# Patient Record
Sex: FEMALE | Race: WHITE | NOT HISPANIC OR LATINO | Employment: UNEMPLOYED | URBAN - METROPOLITAN AREA
[De-identification: names, ages, dates, MRNs, and addresses within clinical notes are randomized per-mention and may not be internally consistent; named-entity substitution may affect disease eponyms.]

---

## 2017-08-10 ENCOUNTER — APPOINTMENT (OUTPATIENT)
Dept: LAB | Facility: HOSPITAL | Age: 32
End: 2017-08-10
Payer: COMMERCIAL

## 2017-08-10 ENCOUNTER — TRANSCRIBE ORDERS (OUTPATIENT)
Dept: ADMINISTRATIVE | Facility: HOSPITAL | Age: 32
End: 2017-08-10

## 2017-08-10 DIAGNOSIS — Z00.8 HEALTH EXAMINATION IN POPULATION SURVEY: Primary | ICD-10-CM

## 2017-08-10 DIAGNOSIS — Z00.8 HEALTH EXAMINATION IN POPULATION SURVEY: ICD-10-CM

## 2017-08-10 LAB
CHOLEST SERPL-MCNC: 199 MG/DL (ref 50–200)
EST. AVERAGE GLUCOSE BLD GHB EST-MCNC: 108 MG/DL
HBA1C MFR BLD: 5.4 % (ref 4.2–6.3)
HDLC SERPL-MCNC: 64 MG/DL (ref 40–60)
LDLC SERPL CALC-MCNC: 126 MG/DL (ref 0–100)
TRIGL SERPL-MCNC: 45 MG/DL

## 2017-08-10 PROCEDURE — 83036 HEMOGLOBIN GLYCOSYLATED A1C: CPT

## 2017-08-10 PROCEDURE — 36415 COLL VENOUS BLD VENIPUNCTURE: CPT

## 2017-08-10 PROCEDURE — 80061 LIPID PANEL: CPT

## 2017-10-11 ENCOUNTER — GENERIC CONVERSION - ENCOUNTER (OUTPATIENT)
Dept: OTHER | Facility: OTHER | Age: 32
End: 2017-10-11

## 2017-11-01 ENCOUNTER — GENERIC CONVERSION - ENCOUNTER (OUTPATIENT)
Dept: OTHER | Facility: OTHER | Age: 32
End: 2017-11-01

## 2017-11-01 PROCEDURE — G0145 SCR C/V CYTO,THINLAYER,RESCR: HCPCS | Performed by: PHYSICIAN ASSISTANT

## 2017-11-01 PROCEDURE — 87624 HPV HI-RISK TYP POOLED RSLT: CPT | Performed by: PHYSICIAN ASSISTANT

## 2017-11-02 ENCOUNTER — LAB REQUISITION (OUTPATIENT)
Dept: LAB | Facility: HOSPITAL | Age: 32
End: 2017-11-02
Payer: COMMERCIAL

## 2017-11-02 DIAGNOSIS — Z01.419 ENCOUNTER FOR GYNECOLOGICAL EXAMINATION WITHOUT ABNORMAL FINDING: ICD-10-CM

## 2017-11-06 LAB — HPV RRNA GENITAL QL NAA+PROBE: NORMAL

## 2017-11-07 LAB
LAB AP GYN PRIMARY INTERPRETATION: NORMAL
Lab: NORMAL

## 2018-01-10 NOTE — PROGRESS NOTES
Assessment    1  Never smoker   2  No drug use   3  Migraines (346 90) (G43 909)    Plan  Migraines    · Topiramate 25 MG Oral Tablet; TAKE 3 TABLET Bedtime    Discussion/Summary    Migraines - restart Topamax 25 mg once at night for 1 week, then 50 mg at night for 1 week then 75 mg once at night  Hopefully you will be migraine free at this point, if not return to office for adjustments  f/u as needed  Possible side effects of new medications were reviewed with the patient/guardian today  The treatment plan was reviewed with the patient/guardian  The patient/guardian understands and agrees with the treatment plan      Chief Complaint  Pt presents to the office to discuss restarting the Topamax  PAP utd, under care [facility]      History of Present Illness  Patient here to go back on Topamax  Had a baby girl 3 weeks ago  Since delivering has already begun to have migraines  Uses Naproxen for breath through migraines with relief  But getting frequently already  Started Topamax for migraines more than 2 years  Took it for 9 months successfully, would get no break through migraines  Weened up to the 75 mg once daily  Started 25 mg once at night, then did 50 mg for two weeks then finally got to 75 mg with relief  Requesting to restart  Active Problems    1  Abnormal quad screen (796 5) (O28 0)   2  Blood type, Rh negative (V49 89) (Z67 91)   3  High risk pregnancy due to history of  labor in third trimester (V23 41) (O09 213)   4  High risk pregnancy with low PAPPA (796 5,V23 89) (O28 0,O09 899)   5  Maternal morbid obesity in third trimester, antepartum (649 13,278 01) (O99 213,E66 01)   6  Migraines (346 90) (G43 909)    Past Medical History    1  History of Need for Tdap vaccination (V06 1) (Z23)   2  History of Pregnancy, obstetrical care (V22 1) (Z34 90)    Surgical History    1  History of Ear Surgery   2  History of Myringotomy   3   History of Tonsillectomy    The surgical history was reviewed and updated today  Family History    1  Family history of goiter (V18 19) (Z83 49)    2  Family history of cardiac arrhythmia (V17 49) (Z82 49)    3  Family history of Colon cancer    4  Family history of malignant neoplasm of uterus (V16 49) (Z80 49)    5  Family history of Type 2 diabetes mellitus, controlled    6  Family history of Type 2 diabetes mellitus, controlled    7  Family history of hyperthyroidism (V18 19) (Z83 49)    Social History    · Alcohol use (V49 89) (F10 99)   · Always uses seat belt   ·    · Never smoker   · No drug use   · Occasional caffeine consumption    Current Meds   1  Magnesium CAPS; TAKE CAPSULE  per pt 400 mg daily; Therapy: (Recorded:46Vju3248) to Recorded    The medication list was reviewed and updated today  Allergies    1  Bactrim TABS    2  No Known Food Allergies   3  Seasonal    Vitals  Vital Signs [Data Includes: Current Encounter]    Recorded: 12Jan2016 11:08AM   Heart Rate 84, R Radial   Respiration 14   Systolic 261, RUE, Sitting   Diastolic 68, RUE, Sitting   Height 5 ft 2 5 in   Weight 220 lb    BMI Calculated 39 6   BSA Calculated 2     Physical Exam    Constitutional   General appearance: No acute distress, well appearing and well nourished  Pulmonary   Respiratory effort: No increased work of breathing or signs of respiratory distress  Auscultation of lungs: Clear to auscultation  Cardiovascular   Palpation of heart: Normal PMI, no thrills  Auscultation of heart: Normal rate and rhythm, normal S1 and S2, without murmurs      Psychiatric   Mood and affect: Normal          Future Appointments    Date/Time Provider Specialty Site   02/02/2016 09:00 AM Sergio Paul MD Obstetrics/Gynecology [address]      Signatures   Electronically signed by : Vandana Hoffman, [facility]; Jan 12 2016  1:09PM EST                       (Author)    Electronically signed by : TILA Carson ; Jan 12 2016  1:11PM EST (Author)

## 2018-01-16 NOTE — PROGRESS NOTES
Assessment    1  Never smoker   2  Encounter for preventive health examination (V70 0) (Z00 00)   3  Migraines (346 90) (G43 909)   4  Morbid obesity (278 01) (E66 01)    Plan  Health Maintenance    · (1) HEMOGLOBIN A1C; Status:Active; Requested for:[id number];    · (1) LIPID PANEL, FASTING; Status:Active; Requested for:34Amh3229;     Discussion/Summary  health maintenance visit healthy adult female Currently, she eats an adequate diet and has an inadequate exercise regimen  the risks and benefits of cervical cancer screening were discussed cervical cancer screening is current Breast cancer screening: the risks and benefits of breast cancer screening were discussed and monthly self breast exam was advised  The risks and benefits of immunizations were discussed and immunizations are up to date  Advice and education were given regarding nutrition, aerobic exercise, weight bearing exercise, weight loss and reproductive health  Patient discussion: discussed with the patient  Physical - conducted, lab slip given for employee physical, discussed healthy diet and weight loss and regular exercise, immunizations up to date    obesity - discussed healthy diet and weight    migraine - well controlled, c/w Topamax 25 mg 3 tabs at night    f/u 1 year or sooner if needed       Chief Complaint  Pt presents to the office for her yearly wellness PE  PAP due, under care [facility]iel[facility]      History of Present Illness  HM, Adult Female: The patient is being seen for a health maintenance evaluation  The last health maintenance visit was 1 year(s) ago  General Health: The patient's health since the last visit is described as good  She has regular dental visits  She denies vision problems  She denies hearing loss  Lifestyle:  She consumes a diverse and healthy diet  She does not exercise regularly  She exercises less than three times a week  Exercise includes walking  She does not use tobacco  She consumes alcohol   She reports occasional alcohol use  She denies drug use  Reproductive health: the patient is premenopausal   LMP - rare spotting with Mirena  Screening:   HPI: Patient here for employee wellness  No problems concerns  Has a 11 month old and trying to eat healthy and exercise but has trouble finding time with baby  Review of Systems    Constitutional: No fever, no chills, feels well, no tiredness, no recent weight gain or weight loss  Eyes: No complaints of eye pain, no red eyes, no eyesight problems, no discharge, no dry eyes, no itching of eyes  ENT: no complaints of earache, no loss of hearing, no nose bleeds, no nasal discharge, no sore throat, no hoarseness  Cardiovascular: No complaints of slow heart rate, no fast heart rate, no chest pain, no palpitations, no leg claudication, no lower extremity edema  Respiratory: No complaints of shortness of breath, no wheezing, no cough, no SOB on exertion, no orthopnea, no PND  Gastrointestinal: No complaints of abdominal pain, no constipation, no nausea or vomiting, no diarrhea, no bloody stools  Genitourinary: No complaints of dysuria, no incontinence, no pelvic pain, no dysmenorrhea, no vaginal discharge or bleeding  Musculoskeletal: No complaints of arthralgias, no myalgias, no joint swelling or stiffness, no limb pain or swelling  Integumentary: No complaints of skin rash or lesions, no itching, no skin wounds, no breast pain or lump  Neurological: No complaints of headache, no confusion, no convulsions, no numbness, no dizziness or fainting, no tingling, no limb weakness, no difficulty walking  Psychiatric: Not suicidal, no sleep disturbance, no anxiety or depression, no change in personality, no emotional problems  Endocrine: No complaints of proptosis, no hot flashes, no muscle weakness, no deepening of the voice, no feelings of weakness     Hematologic/Lymphatic: No complaints of swollen glands, no swollen glands in the neck, does not bleed easily, does not bruise easily  Active Problems    1  Migraines (346 90) (G43 909)    Past Medical History    · History of Abnormal quad screen (796 5) (O28 0)   · History of Blood type, Rh negative (V49 89) (Z67 91)   · History of Encounter for gynecological examination without abnormal finding (V72 31)  (Z01 419)   · History of Encounter for insertion of mirena IUD (V25 11) (Z30 430)   · History of High risk pregnancy due to history of  labor in third trimester (V23 41)  (O09 213)   · History of High risk pregnancy with low PAPPA (796 5,V23 89) (O28 0,O09 899)   · History of Irregular bleeding (626 4) (N92 6)   · History of Maternal morbid obesity in third trimester, antepartum (649 13,278 01)  (O99 213,E66 01)   · History of Need for Tdap vaccination (V06 1) (Z23)   · History of Postpartum exam (V24 2) (Z39 2)   · History of Pregnancy, obstetrical care (V22 1) (Z34 90)    Surgical History    · History of Ear Surgery   · History of Myringotomy   · History of Tonsillectomy    Family History  Mother    · Family history of goiter (V18 19) (Z80 46)  Father    · Family history of cardiac arrhythmia (V17 49) (Z82 49)  Maternal Grandmother    · Family history of Colon cancer  Paternal Grandmother    · Family history of malignant neoplasm of uterus (V16 49) (Z80 49)  Maternal Grandfather    · Family history of Type 2 diabetes mellitus, controlled  Paternal Grandfather    · Family history of Type 2 diabetes mellitus, controlled  Maternal Aunt    · Family history of hyperthyroidism (V18 19) (Z83 49)    Social History    · Alcohol use (V49 89) (Z78 9)   · Always uses seat belt   ·    · Never smoker   · No drug use   · Occasional caffeine consumption    Current Meds   1  Topiramate 25 MG Oral Tablet; TAKE 3 TABLET Bedtime; Therapy: [id number] to (Evaluate:77Oue5529)  Requested for: 2016; Last   Rx:2016 Ordered    Allergies    1  Bactrim TABS    2  No Known Food Allergies   3  Seasonal    Vitals   Recorded: 22ECG4000 27:03UI   Systolic 423, RUE, Sitting   Diastolic 70, RUE, Sitting   Heart Rate 68, R Radial   Pulse Quality Normal, R Radial   Respiration Quality Normal   Respiration 16   Height 5 ft 2 25 in   Weight 221 lb 12 8 oz   BMI Calculated 40 24   BSA Calculated 2     Physical Exam    Constitutional   General appearance: No acute distress, well appearing and well nourished  Head and Face   Head and face: Normal     Eyes   Conjunctiva and lids: No swelling, erythema or discharge  Pupils and irises: Equal, round, reactive to light  Ears, Nose, Mouth, and Throat   External inspection of ears and nose: Normal     Otoscopic examination: Tympanic membranes translucent with normal light reflex  Canals patent without erythema  Hearing: Normal     Nasal mucosa, septum, and turbinates: Normal without edema or erythema  Lips, teeth, and gums: Normal, good dentition  Oropharynx: Normal with no erythema, edema, exudate or lesions  Neck   Neck: Supple, symmetric, trachea midline, no masses  Thyroid: Normal, no thyromegaly  Pulmonary   Respiratory effort: No increased work of breathing or signs of respiratory distress  Palpation of chest: Normal     Auscultation of lungs: Clear to auscultation  Cardiovascular   Palpation of heart: Normal PMI, no thrills  Auscultation of heart: Normal rate and rhythm, normal S1 and S2, no murmurs  Pedal pulses: 2+ bilaterally  Examination of extremities for edema and/or varicosities: Normal     Abdomen   Abdomen: Non-tender, no masses  Liver and spleen: No hepatomegaly or splenomegaly  Lymphatic   Palpation of lymph nodes in neck: No lymphadenopathy  Musculoskeletal   Gait and station: Normal     Digits and nails: Normal without clubbing or cyanosis      Joints, bones, and muscles: Normal     Range of motion: Normal     Stability: Normal     Muscle strength/tone: Normal     Skin   Skin and subcutaneous tissue: Normal without rashes or lesions  Palpation of skin and subcutaneous tissue: Normal turgor  Neurologic   Cranial nerves: Cranial nerves II-XII intact  Reflexes: 2+ and symmetric      Psychiatric   Judgment and insight: Normal     Mood and affect: Normal        Signatures   Electronically signed by : Julius Potter, [facility]; Jul 18 2016 12:10PM EST                       (Author)    Electronically signed by : TILA Pierre ; Jul 18 2016 12:22PM EST                       (Author)

## 2018-01-22 VITALS
HEIGHT: 62 IN | BODY MASS INDEX: 43.61 KG/M2 | DIASTOLIC BLOOD PRESSURE: 80 MMHG | SYSTOLIC BLOOD PRESSURE: 110 MMHG | WEIGHT: 237 LBS

## 2018-01-22 VITALS — BODY MASS INDEX: 42.4 KG/M2 | WEIGHT: 230.4 LBS | HEIGHT: 62 IN

## 2018-02-10 DIAGNOSIS — Z12.31 ENCOUNTER FOR SCREENING MAMMOGRAM FOR MALIGNANT NEOPLASM OF BREAST: ICD-10-CM

## 2018-04-06 ENCOUNTER — APPOINTMENT (EMERGENCY)
Dept: ULTRASOUND IMAGING | Facility: HOSPITAL | Age: 33
End: 2018-04-06
Payer: COMMERCIAL

## 2018-04-06 ENCOUNTER — HOSPITAL ENCOUNTER (EMERGENCY)
Facility: HOSPITAL | Age: 33
Discharge: HOME | End: 2018-04-06
Admitting: EMERGENCY MEDICINE
Payer: COMMERCIAL

## 2018-04-06 VITALS
OXYGEN SATURATION: 98 % | BODY MASS INDEX: 40.75 KG/M2 | RESPIRATION RATE: 19 BRPM | HEIGHT: 63 IN | DIASTOLIC BLOOD PRESSURE: 65 MMHG | HEART RATE: 70 BPM | TEMPERATURE: 96.9 F | WEIGHT: 230 LBS | SYSTOLIC BLOOD PRESSURE: 130 MMHG

## 2018-04-06 DIAGNOSIS — R11.0 NAUSEA: ICD-10-CM

## 2018-04-06 DIAGNOSIS — K29.70 GASTRITIS: Primary | ICD-10-CM

## 2018-04-06 LAB
ALBUMIN SERPL BCP-MCNC: 4.1 G/DL (ref 3.5–5)
ALP SERPL-CCNC: 42 U/L (ref 46–116)
ALT SERPL W P-5'-P-CCNC: 39 U/L (ref 12–78)
ANION GAP SERPL CALCULATED.3IONS-SCNC: 10 MMOL/L (ref 4–13)
AST SERPL W P-5'-P-CCNC: 23 U/L (ref 5–45)
BACTERIA UR QL AUTO: ABNORMAL /HPF
BASOPHILS # BLD AUTO: 0.02 THOUSANDS/ΜL (ref 0–0.1)
BASOPHILS NFR BLD AUTO: 0 % (ref 0–1)
BILIRUB SERPL-MCNC: 0.4 MG/DL (ref 0.2–1)
BILIRUB UR QL STRIP: NEGATIVE
BUN SERPL-MCNC: 16 MG/DL (ref 5–25)
CALCIUM SERPL-MCNC: 8.4 MG/DL (ref 8.3–10.1)
CHLORIDE SERPL-SCNC: 103 MMOL/L (ref 100–108)
CLARITY UR: ABNORMAL
CO2 SERPL-SCNC: 27 MMOL/L (ref 21–32)
COLOR UR: YELLOW
CREAT SERPL-MCNC: 0.79 MG/DL (ref 0.6–1.3)
EOSINOPHIL # BLD AUTO: 0.04 THOUSAND/ΜL (ref 0–0.61)
EOSINOPHIL NFR BLD AUTO: 0 % (ref 0–6)
ERYTHROCYTE [DISTWIDTH] IN BLOOD BY AUTOMATED COUNT: 13.7 % (ref 11.6–15.1)
EXT PREG TEST URINE: NEGATIVE
GFR SERPL CREATININE-BSD FRML MDRD: 97 ML/MIN/1.73SQ M
GLUCOSE SERPL-MCNC: 89 MG/DL (ref 65–140)
GLUCOSE UR STRIP-MCNC: NEGATIVE MG/DL
HCT VFR BLD AUTO: 42.5 % (ref 34.8–46.1)
HGB BLD-MCNC: 14.4 G/DL (ref 11.5–15.4)
HGB UR QL STRIP.AUTO: ABNORMAL
KETONES UR STRIP-MCNC: NEGATIVE MG/DL
LEUKOCYTE ESTERASE UR QL STRIP: ABNORMAL
LIPASE SERPL-CCNC: 117 U/L (ref 73–393)
LYMPHOCYTES # BLD AUTO: 2.04 THOUSANDS/ΜL (ref 0.6–4.47)
LYMPHOCYTES NFR BLD AUTO: 20 % (ref 14–44)
MCH RBC QN AUTO: 28.4 PG (ref 26.8–34.3)
MCHC RBC AUTO-ENTMCNC: 33.9 G/DL (ref 31.4–37.4)
MCV RBC AUTO: 84 FL (ref 82–98)
MONOCYTES # BLD AUTO: 0.56 THOUSAND/ΜL (ref 0.17–1.22)
MONOCYTES NFR BLD AUTO: 5 % (ref 4–12)
MUCOUS THREADS UR QL AUTO: ABNORMAL
NEUTROPHILS # BLD AUTO: 7.75 THOUSANDS/ΜL (ref 1.85–7.62)
NEUTS SEG NFR BLD AUTO: 75 % (ref 43–75)
NITRITE UR QL STRIP: NEGATIVE
NON-SQ EPI CELLS URNS QL MICRO: ABNORMAL /HPF
OTHER STN SPEC: ABNORMAL
PH UR STRIP.AUTO: 5.5 [PH] (ref 4.5–8)
PLATELET # BLD AUTO: 319 THOUSANDS/UL (ref 149–390)
PMV BLD AUTO: 10.1 FL (ref 8.9–12.7)
POTASSIUM SERPL-SCNC: 3.9 MMOL/L (ref 3.5–5.3)
PROT SERPL-MCNC: 8.1 G/DL (ref 6.4–8.2)
PROT UR STRIP-MCNC: NEGATIVE MG/DL
RBC # BLD AUTO: 5.07 MILLION/UL (ref 3.81–5.12)
RBC #/AREA URNS AUTO: ABNORMAL /HPF
SODIUM SERPL-SCNC: 140 MMOL/L (ref 136–145)
SP GR UR STRIP.AUTO: >=1.03 (ref 1–1.03)
UROBILINOGEN UR QL STRIP.AUTO: 0.2 E.U./DL
WBC # BLD AUTO: 10.41 THOUSAND/UL (ref 4.31–10.16)
WBC #/AREA URNS AUTO: ABNORMAL /HPF

## 2018-04-06 PROCEDURE — 76705 ECHO EXAM OF ABDOMEN: CPT

## 2018-04-06 PROCEDURE — 83690 ASSAY OF LIPASE: CPT | Performed by: PHYSICIAN ASSISTANT

## 2018-04-06 PROCEDURE — 99284 EMERGENCY DEPT VISIT MOD MDM: CPT

## 2018-04-06 PROCEDURE — 96374 THER/PROPH/DIAG INJ IV PUSH: CPT

## 2018-04-06 PROCEDURE — 36415 COLL VENOUS BLD VENIPUNCTURE: CPT | Performed by: PHYSICIAN ASSISTANT

## 2018-04-06 PROCEDURE — 81001 URINALYSIS AUTO W/SCOPE: CPT | Performed by: PHYSICIAN ASSISTANT

## 2018-04-06 PROCEDURE — 96375 TX/PRO/DX INJ NEW DRUG ADDON: CPT

## 2018-04-06 PROCEDURE — 80053 COMPREHEN METABOLIC PANEL: CPT | Performed by: PHYSICIAN ASSISTANT

## 2018-04-06 PROCEDURE — 85025 COMPLETE CBC W/AUTO DIFF WBC: CPT | Performed by: PHYSICIAN ASSISTANT

## 2018-04-06 PROCEDURE — C9113 INJ PANTOPRAZOLE SODIUM, VIA: HCPCS | Performed by: PHYSICIAN ASSISTANT

## 2018-04-06 PROCEDURE — 81025 URINE PREGNANCY TEST: CPT | Performed by: PHYSICIAN ASSISTANT

## 2018-04-06 RX ORDER — ONDANSETRON 4 MG/1
4 TABLET, ORALLY DISINTEGRATING ORAL EVERY 8 HOURS PRN
Qty: 15 TABLET | Refills: 0 | Status: SHIPPED | OUTPATIENT
Start: 2018-04-06 | End: 2021-08-30 | Stop reason: ALTCHOICE

## 2018-04-06 RX ORDER — TOPIRAMATE 25 MG/1
75 TABLET ORAL DAILY
COMMUNITY
End: 2018-11-23 | Stop reason: SDUPTHER

## 2018-04-06 RX ORDER — ONDANSETRON 2 MG/ML
4 INJECTION INTRAMUSCULAR; INTRAVENOUS ONCE
Status: COMPLETED | OUTPATIENT
Start: 2018-04-06 | End: 2018-04-06

## 2018-04-06 RX ORDER — PANTOPRAZOLE SODIUM 40 MG/1
40 INJECTION, POWDER, FOR SOLUTION INTRAVENOUS ONCE
Status: COMPLETED | OUTPATIENT
Start: 2018-04-06 | End: 2018-04-06

## 2018-04-06 RX ORDER — PANTOPRAZOLE SODIUM 20 MG/1
20 TABLET, DELAYED RELEASE ORAL DAILY
Qty: 20 TABLET | Refills: 0 | Status: SHIPPED | OUTPATIENT
Start: 2018-04-06 | End: 2018-09-24 | Stop reason: ALTCHOICE

## 2018-04-06 RX ADMIN — PANTOPRAZOLE SODIUM 40 MG: 40 INJECTION, POWDER, FOR SOLUTION INTRAVENOUS at 13:22

## 2018-04-06 RX ADMIN — ONDANSETRON HYDROCHLORIDE 4 MG: 2 INJECTION, SOLUTION INTRAMUSCULAR; INTRAVENOUS at 12:28

## 2018-04-06 NOTE — DISCHARGE INSTRUCTIONS
Drink plenty of fluids  Follow up with your family doctor if symptoms are not improving  If symptoms worsen return to the ER  Gastritis   WHAT YOU NEED TO KNOW:   Gastritis is inflammation or irritation of the lining of your stomach  DISCHARGE INSTRUCTIONS:   Call 911 for any of the following:   · You develop chest pain or shortness of breath  Return to the emergency department if:   · You vomit blood  · You have black or bloody bowel movements  · You have severe stomach or back pain  Contact your healthcare provider if:   · You have a fever  · You have new or worsening symptoms, even after treatment  · You have questions or concerns about your condition or care  Medicines:   · Medicines  may be given to help treat a bacterial infection or decrease stomach acid  · Take your medicine as directed  Contact your healthcare provider if you think your medicine is not helping or if you have side effects  Tell him or her if you are allergic to any medicine  Keep a list of the medicines, vitamins, and herbs you take  Include the amounts, and when and why you take them  Bring the list or the pill bottles to follow-up visits  Carry your medicine list with you in case of an emergency  Manage or prevent gastritis:   · Do not smoke  Nicotine and other chemicals in cigarettes and cigars can make your symptoms worse and cause lung damage  Ask your healthcare provider for information if you currently smoke and need help to quit  E-cigarettes or smokeless tobacco still contain nicotine  Talk to your healthcare provider before you use these products  · Do not drink alcohol  Alcohol can prevent healing and make your gastritis worse  Talk to your healthcare provider if you need help to stop drinking  · Do not take NSAIDs or aspirin unless directed  These and similar medicines can cause irritation  If your healthcare provider says it is okay to take NSAIDs, take them with food       · Do not eat foods that cause irritation  Foods such as oranges and salsa can cause burning or pain  Eat a variety of healthy foods  Examples include fruits (not citrus), vegetables, low-fat dairy products, beans, whole-grain breads, and lean meats and fish  Try to eat small meals, and drink water with your meals  Do not eat for at least 3 hours before you go to bed  · Find ways to relax and decrease stress  Stress can increase stomach acid and make gastritis worse  Activities such as yoga, meditation, or listening to music can help you relax  Spend time with friends, or do things you enjoy  Follow up with your healthcare provider as directed: You may need ongoing tests or treatment, or referral to a gastroenterologist  Write down your questions so you remember to ask them during your visits  © 2017 [employer] Information is for End User's use only and may not be sold, redistributed or otherwise used for commercial purposes  All illustrations and images included in CareNotes® are the copyrighted property of [employer]  or [employer]  The above information is an  only  It is not intended as medical advice for individual conditions or treatments  Talk to your doctor, nurse or pharmacist before following any medical regimen to see if it is safe and effective for you  Acute Nausea and Vomiting   WHAT YOU NEED TO KNOW:   Acute nausea and vomiting start suddenly, worsen quickly, and last a short time  DISCHARGE INSTRUCTIONS:   Return to the emergency department if:   · You see blood in your vomit or your bowel movements  · You have sudden, severe pain in your chest and upper abdomen after hard vomiting or retching  · You have swelling in your neck and chest      · You are dizzy, cold, and thirsty and your eyes and mouth are dry  · You are urinating very little or not at all  · You have muscle weakness, leg cramps, and trouble breathing       · Your heart is beating much faster than normal      · You continue to vomit for more than 48 hours  Contact your healthcare provider if:   · You have frequent dry heaves (vomiting but nothing comes out)  · Your nausea and vomiting does not get better or go away after you use medicine  · You have questions or concerns about your condition or treatment  Medicines: You may need any of the following:  · Medicines  may be given to calm your stomach and stop your vomiting  You may also need medicines to help you feel more relaxed or to stop nausea and vomiting caused by motion sickness  · Gastrointestinal stimulants  are used to help empty your stomach and bowels  This may help decrease nausea and vomiting  · Take your medicine as directed  Contact your healthcare provider if you think your medicine is not helping or if you have side effects  Tell him or her if you are allergic to any medicine  Keep a list of the medicines, vitamins, and herbs you take  Include the amounts, and when and why you take them  Bring the list or the pill bottles to follow-up visits  Carry your medicine list with you in case of an emergency  Prevent or manage acute nausea and vomiting:   · Do not drink alcohol  Alcohol may upset or irritate your stomach  Too much alcohol can also cause acute nausea and vomiting  · Control stress  Headaches due to stress may cause nausea and vomiting  Find ways to relax and manage your stress  Get more rest and sleep  · Drink more liquids as directed  Vomiting can lead to dehydration  It is important to drink more liquids to help replace lost body fluids  Ask your healthcare provider how much liquid to drink each day and which liquids are best for you  Your provider may recommend that you drink an oral rehydration solution (ORS)  ORS contains water, salts, and sugar that are needed to replace the lost body fluids  Ask what kind of ORS to use, how much to drink, and where to get it      · Eat smaller meals, more often  Eat small amounts of food every 2 to 3 hours, even if you are not hungry  Food in your stomach may decrease your nausea  · Talk to your healthcare provider before you take over-the-counter (OTC) medicines  These medicines can cause serious problems if you use certain other medicines, or you have a medical condition  You may have problems if you use too much or use them for longer than the label says  Follow directions on the label carefully  Follow up with your healthcare provider as directed:  Write down your questions so you remember to ask them during your follow-up visits  © 2017 [facility] Information is for End User's use only and may not be sold, redistributed or otherwise used for commercial purposes  All illustrations and images included in CareNotes® are the copyrighted property of [employer]  or [employer]  The above information is an  only  It is not intended as medical advice for individual conditions or treatments  Talk to your doctor, nurse or pharmacist before following any medical regimen to see if it is safe and effective for you

## 2018-04-06 NOTE — ED PROVIDER NOTES
History  Chief Complaint   Patient presents with    Abdominal Pain     patient presents to the ER stating she has stomach pain and burning after eating with dry heaves since easter  27 yo female presents to the ER with upper abdominal pain/cramping and nausea that started 1 week ago  Pt states that she has a burning sensation across the top of her abdomen and worsens after she eats  Symptoms last approx 1 hour and pt has chills when she is having pain  Pt has tried pepto which has helped but states that she would have to drink it all the time to get relief  Pt has not vomited, denies diarrhea or constipation and states that the pain is mainly across the top of her abdomen and currently feels like she has a ball at the top of her throat and nauseous  Pt denies SOB, dizziness, chest tightness or pain, back pain  Pt has had a decrease in appetite  Prior to Admission Medications   Prescriptions Last Dose Informant Patient Reported? Taking?   topiramate (TOPAMAX) 25 mg tablet   Yes Yes   Sig: Take 75 mg by mouth daily      Facility-Administered Medications: None       Past Medical History:   Diagnosis Date    Migraine        Past Surgical History:   Procedure Laterality Date    TONSILLECTOMY      WISDOM TOOTH EXTRACTION         History reviewed  No pertinent family history  I have reviewed and agree with the history as documented  Social History   Substance Use Topics    Smoking status: Never Smoker    Smokeless tobacco: Never Used    Alcohol use No        Review of Systems   Constitutional: Positive for appetite change and chills  Respiratory: Negative for chest tightness and shortness of breath  Cardiovascular: Negative for chest pain  Gastrointestinal: Positive for abdominal pain and nausea  Negative for constipation, diarrhea and vomiting  All other systems reviewed and are negative        Physical Exam  ED Triage Vitals [04/06/18 1146]   Temperature Pulse Respirations Blood Pressure SpO2   (!) 96 9 °F (36 1 °C) 75 18 125/62 99 %      Temp Source Heart Rate Source Patient Position - Orthostatic VS BP Location FiO2 (%)   Tympanic Monitor Lying Right arm --      Pain Score       4           Orthostatic Vital Signs  Vitals:    04/06/18 1146 04/06/18 1300 04/06/18 1400   BP: 125/62 120/60 130/65   Pulse: 75 73 70   Patient Position - Orthostatic VS: Lying         Physical Exam   Constitutional: She is oriented to person, place, and time  Vital signs are normal  She appears well-developed and well-nourished  HENT:   Head: Normocephalic and atraumatic  Eyes: Conjunctivae and EOM are normal  Pupils are equal, round, and reactive to light  Neck: Normal range of motion  Neck supple  Cardiovascular: Normal rate, regular rhythm and normal heart sounds  Pulmonary/Chest: Effort normal and breath sounds normal    Abdominal: Soft  Bowel sounds are normal  There is tenderness in the right upper quadrant, epigastric area and left upper quadrant  There is positive Parrish's sign (mild, pt felt nauseous after pressing in RUQ)  There is no CVA tenderness and no tenderness at McBurney's point  Musculoskeletal: Normal range of motion  Neurological: She is alert and oriented to person, place, and time  Skin: Skin is warm and dry  Psychiatric: She has a normal mood and affect  Her speech is normal and behavior is normal  Judgment and thought content normal    Nursing note and vitals reviewed        ED Medications  Medications   ondansetron (ZOFRAN) injection 4 mg (4 mg Intravenous Given 4/6/18 1228)   pantoprazole (PROTONIX) injection 40 mg (40 mg Intravenous Given 4/6/18 1322)       Diagnostic Studies  Results Reviewed     Procedure Component Value Units Date/Time    Urine Microscopic [[id number]]  (Abnormal) Collected:  04/06/18 1225    Lab Status:  Final result Specimen:  Urine from Urine, Clean Catch Updated:  04/06/18 1310     RBC, UA 1-2 (A) /hpf      WBC, UA 4-10 (A) /hpf      Epithelial Cells Moderate (A) /hpf      Bacteria, UA Occasional /hpf      OTHER OBSERVATIONS Yeast Cells Present  Renal Epithelial Cells Present     MUCOUS THREADS Occasional    Comprehensive metabolic panel [[id number]]  (Abnormal) Collected:  04/06/18 1223    Lab Status:  Final result Specimen:  Blood from Arm, Left Updated:  04/06/18 1253     Sodium 140 mmol/L      Potassium 3 9 mmol/L      Chloride 103 mmol/L      CO2 27 mmol/L      Anion Gap 10 mmol/L      BUN 16 mg/dL      Creatinine 0 79 mg/dL      Glucose 89 mg/dL      Calcium 8 4 mg/dL      AST 23 U/L      ALT 39 U/L      Alkaline Phosphatase 42 (L) U/L      Total Protein 8 1 g/dL      Albumin 4 1 g/dL      Total Bilirubin 0 40 mg/dL      eGFR 97 ml/min/1 73sq m     Narrative:         National Kidney Disease Education Program recommendations are as follows:  GFR calculation is accurate only with a steady state creatinine  Chronic Kidney disease less than 60 ml/min/1 73 sq  meters  Kidney failure less than 15 ml/min/1 73 sq  meters      Lipase [[id number]]  (Normal) Collected:  04/06/18 1223    Lab Status:  Final result Specimen:  Blood from Arm, Left Updated:  04/06/18 1253     Lipase 117 u/L     UA w Reflex to Microscopic w Reflex to Culture [[id number]]  (Abnormal) Collected:  04/06/18 1225    Lab Status:  Final result Specimen:  Urine from Urine, Clean Catch Updated:  04/06/18 1250     Color, UA Yellow     Clarity, UA Slightly Cloudy     Specific Gravity, UA >=1 030     pH, UA 5 5     Leukocytes, UA Small (A)     Nitrite, UA Negative     Protein, UA Negative mg/dl      Glucose, UA Negative mg/dl      Ketones, UA Negative mg/dl      Urobilinogen, UA 0 2 E U /dl      Bilirubin, UA Negative     Blood, UA Moderate (A)    CBC and differential [[id number]]  (Abnormal) Collected:  04/06/18 1223    Lab Status:  Final result Specimen:  Blood from Arm, Left Updated:  04/06/18 1236     WBC 10 41 (H) Thousand/uL      RBC 5 07 Million/uL      Hemoglobin 14 4 g/dL      Hematocrit 42 5 % MCV 84 fL      MCH 28 4 pg      MCHC 33 9 g/dL      RDW 13 7 %      MPV 10 1 fL      Platelets 373 Thousands/uL      Neutrophils Relative 75 %      Lymphocytes Relative 20 %      Monocytes Relative 5 %      Eosinophils Relative 0 %      Basophils Relative 0 %      Neutrophils Absolute 7 75 (H) Thousands/µL      Lymphocytes Absolute 2 04 Thousands/µL      Monocytes Absolute 0 56 Thousand/µL      Eosinophils Absolute 0 04 Thousand/µL      Basophils Absolute 0 02 Thousands/µL     POCT pregnancy, urine [[id number]]  (Normal) Resulted:  04/06/18 1232    Lab Status:  Final result Updated:  04/06/18 1232     EXT PREG TEST UR (Ref: Negative) negative                 US gallbladder   Final Result by Delfina Pena DO (04/06 1435)      Limited evaluation of the pancreas  Otherwise, unremarkable right upper quadrant ultrasound  Workstation performed: [id number]                    Procedures  Procedures       Phone Contacts  ED Phone Contact    ED Course  ED Course                                MDM  Number of Diagnoses or Management Options  Gastritis: new and requires workup  Nausea: new and does not require workup  Diagnosis management comments: Patient noted improvement after Protonix was given an       Amount and/or Complexity of Data Reviewed  Clinical lab tests: ordered and reviewed  Tests in the radiology section of CPT®: ordered and reviewed    Patient Progress  Patient progress: stable    CritCare Time    Disposition  Final diagnoses:   Gastritis   Nausea     Time reflects when diagnosis was documented in both MDM as applicable and the Disposition within this note     Time User Action Codes Description Comment    4/6/2018  3:04 PM Megan Dhillon Add [K29 70] Gastritis     4/6/2018  3:07 PM Megan Dhillon Add [R11 0] Nausea       ED Disposition     ED Disposition Condition Comment    Discharge  [facility] discharge to home/self care      Condition at discharge: Stable        Follow-up Information Follow up With Specialties Details Why Contact Info    Norman Burgos MD Family Medicine Call For Recheck, If symptoms worsen [address] [place] [address]  [phone]          Discharge Medication List as of 4/6/2018  3:08 PM      START taking these medications    Details   ondansetron (ZOFRAN-ODT) 4 mg disintegrating tablet Take 1 tablet (4 mg total) by mouth every 8 (eight) hours as needed for nausea or vomiting for up to 5 days, Starting Fri 4/6/2018, Until Wed 4/11/2018, Print      pantoprazole (PROTONIX) 20 mg tablet Take 1 tablet (20 mg total) by mouth daily, Starting Fri 4/6/2018, Print         CONTINUE these medications which have NOT CHANGED    Details   topiramate (TOPAMAX) 25 mg tablet Take 75 mg by mouth daily, Historical Med           No discharge procedures on file      ED Provider  Electronically Signed by           Matt Serrato PA-C  04/12/18 0155

## 2018-08-08 ENCOUNTER — TRANSCRIBE ORDERS (OUTPATIENT)
Dept: LAB | Facility: CLINIC | Age: 33
End: 2018-08-08

## 2018-08-08 ENCOUNTER — APPOINTMENT (OUTPATIENT)
Dept: LAB | Facility: CLINIC | Age: 33
End: 2018-08-08

## 2018-08-08 DIAGNOSIS — Z00.8 HEALTH EXAMINATION IN POPULATION SURVEY: Primary | ICD-10-CM

## 2018-08-08 LAB
CHOLEST SERPL-MCNC: 183 MG/DL (ref 50–200)
EST. AVERAGE GLUCOSE BLD GHB EST-MCNC: 108 MG/DL
HBA1C MFR BLD: 5.4 % (ref 4.2–6.3)
HDLC SERPL-MCNC: 55 MG/DL (ref 40–60)
LDLC SERPL CALC-MCNC: 115 MG/DL (ref 0–100)
NONHDLC SERPL-MCNC: 128 MG/DL
TRIGL SERPL-MCNC: 67 MG/DL

## 2018-08-08 PROCEDURE — 83036 HEMOGLOBIN GLYCOSYLATED A1C: CPT

## 2018-08-08 PROCEDURE — 36415 COLL VENOUS BLD VENIPUNCTURE: CPT

## 2018-08-08 PROCEDURE — 80061 LIPID PANEL: CPT

## 2018-09-24 ENCOUNTER — OFFICE VISIT (OUTPATIENT)
Dept: FAMILY MEDICINE CLINIC | Facility: CLINIC | Age: 33
End: 2018-09-24
Payer: COMMERCIAL

## 2018-09-24 VITALS
WEIGHT: 241 LBS | HEART RATE: 75 BPM | HEIGHT: 62 IN | DIASTOLIC BLOOD PRESSURE: 80 MMHG | RESPIRATION RATE: 17 BRPM | BODY MASS INDEX: 44.35 KG/M2 | SYSTOLIC BLOOD PRESSURE: 140 MMHG

## 2018-09-24 DIAGNOSIS — E66.01 MORBID OBESITY (HCC): ICD-10-CM

## 2018-09-24 DIAGNOSIS — G43.909 MIGRAINE WITHOUT STATUS MIGRAINOSUS, NOT INTRACTABLE, UNSPECIFIED MIGRAINE TYPE: ICD-10-CM

## 2018-09-24 DIAGNOSIS — F41.9 ANXIETY: Primary | ICD-10-CM

## 2018-09-24 PROBLEM — T75.3XXA MOTION SICKNESS: Status: ACTIVE | Noted: 2017-01-06

## 2018-09-24 PROCEDURE — 3008F BODY MASS INDEX DOCD: CPT | Performed by: PHYSICIAN ASSISTANT

## 2018-09-24 PROCEDURE — 1036F TOBACCO NON-USER: CPT | Performed by: PHYSICIAN ASSISTANT

## 2018-09-24 PROCEDURE — 99214 OFFICE O/P EST MOD 30 MIN: CPT | Performed by: PHYSICIAN ASSISTANT

## 2018-09-24 RX ORDER — ALPRAZOLAM 0.25 MG/1
0.25 TABLET ORAL 4 TIMES DAILY PRN
Qty: 30 TABLET | Refills: 0 | Status: SHIPPED | OUTPATIENT
Start: 2018-09-24

## 2018-09-24 NOTE — PROGRESS NOTES
Assessment/Plan:    1  Anxiety    - see [facility] for therapy, Xanax prn for panic attacks, follow up after 6-8 weeks of therapy  - ALPRAZolam (XANAX) 0 25 mg tablet; Take 1 tablet (0 25 mg total) by mouth 4 (four) times a day as needed for anxiety  Dispense: 30 tablet; Refill: 0    2  Migraine without status migrainosus, not intractable, unspecified migraine type    - c/w Topamax, most likely once anxiety is controlled, migranes will be controlled    F/u as needed  F/u 6-8 weeks      Visit length 35 minutes more than 50% spent counseling    Subjective:   Chief Complaint   Patient presents with    Migraine      Patient ID: Terry Hercules is a 26 y o  female  Patient here with  who is a neurologist to discuss migraines  Last week had migraine all week  Lasted about 4-5 days  Felt blurry vision, light headed, would get better at night but would feel hung over, migraine would wake patient up, spots all over head  Lasted all week  No matter what patient did she could not break the cycle  She is taking topamax 75 mg once daily which has managed migraines well until now  Tried ibuprofen  Yesterday with a dull headache with eating felt better  Patient  also concerned  Patient has been very short tempered lately, cannot seem to keep calm in a normal situation  Over reacting regarding simple things like "the smell in the kitchen that  does not notice but does not think is a big deal, it can be dealt with" but to patient it is a huge deal  Back in April patient ended up in the ER for abdominal pain which was found to be a normal work up, looking back both patient and  think it was  Panic attack while patient was preparing for vacation  Patient worries about every thing, all three children, them not coming from school due to a tragedy  She worries about everything  Denies [place], [place]          The following portions of the patient's history were reviewed and updated as appropriate: allergies, current medications, past family history, past medical history, past social history, past surgical history and problem list     Past Medical History:   Diagnosis Date    Blood type, Rh negative     Resolved 2/18/2016     Migraine      Past Surgical History:   Procedure Laterality Date    EAR SURGERY      Last impression 8/5/2015     MYRINGOTOMY      TONSILLECTOMY      WISDOM TOOTH EXTRACTION       Family History   Problem Relation Age of Onset    Goiter Mother     Breast cancer Mother     Other Father     Colon cancer Maternal Grandmother     Diabetes Maternal Grandfather     Uterine cancer Paternal Grandmother     Diabetes Paternal Grandfather     Hyperthyroidism Maternal Aunt     Alcohol abuse Neg Hx     Substance Abuse Neg Hx     Mental illness Neg Hx      Social History     Social History    Marital status: /Civil Union     Spouse name: N/A    Number of children: N/A    Years of education: N/A     Occupational History    Not on file       Social History Main Topics    Smoking status: Never Smoker    Smokeless tobacco: Never Used    Alcohol use No    Drug use: No    Sexual activity: Not on file     Other Topics Concern    Not on file     Social History Narrative    Alcohol use - as per [employer]    Always uses seat belt    Daily caffeine consumption, 1 serving a day - as per [employer]     Has smoke detectors        Occasional caffeine consumption - as per [employer]        Current Outpatient Prescriptions:     topiramate (TOPAMAX) 25 mg tablet, Take 75 mg by mouth daily, Disp: , Rfl:     ondansetron (ZOFRAN-ODT) 4 mg disintegrating tablet, Take 1 tablet (4 mg total) by mouth every 8 (eight) hours as needed for nausea or vomiting for up to 5 days, Disp: 15 tablet, Rfl: 0    Review of Systems          Objective:    Vitals:    09/24/18 1409   BP: 140/80   BP Location: Right arm   Patient Position: Sitting   Cuff Size: Standard   Pulse: 75   Resp: 17   Weight: 109 kg (241 lb)   Height: 5' 2 25" (1 581 m)     Body mass index is 43 73 kg/m²  Physical Exam   Constitutional: She appears well-developed and well-nourished     Psychiatric:   Patient tearful, crying at some points in discussion, anxious

## 2018-10-08 ENCOUNTER — IMMUNIZATION (OUTPATIENT)
Dept: FAMILY MEDICINE CLINIC | Facility: CLINIC | Age: 33
End: 2018-10-08
Payer: COMMERCIAL

## 2018-10-08 DIAGNOSIS — Z23 ENCOUNTER FOR IMMUNIZATION: ICD-10-CM

## 2018-10-08 DIAGNOSIS — Z23 NEED FOR PROPHYLACTIC VACCINATION AND INOCULATION AGAINST INFLUENZA: Primary | ICD-10-CM

## 2018-10-08 PROCEDURE — 90471 IMMUNIZATION ADMIN: CPT

## 2018-10-08 PROCEDURE — 90686 IIV4 VACC NO PRSV 0.5 ML IM: CPT

## 2018-11-23 DIAGNOSIS — G43.909 MIGRAINE WITHOUT STATUS MIGRAINOSUS, NOT INTRACTABLE, UNSPECIFIED MIGRAINE TYPE: Primary | ICD-10-CM

## 2018-11-23 RX ORDER — TOPIRAMATE 25 MG/1
TABLET ORAL
Qty: 90 TABLET | Refills: 0 | Status: SHIPPED | OUTPATIENT
Start: 2018-11-23 | End: 2019-01-29 | Stop reason: SDUPTHER

## 2019-01-29 DIAGNOSIS — G43.909 MIGRAINE WITHOUT STATUS MIGRAINOSUS, NOT INTRACTABLE, UNSPECIFIED MIGRAINE TYPE: ICD-10-CM

## 2019-01-29 RX ORDER — TOPIRAMATE 25 MG/1
TABLET ORAL
Qty: 90 TABLET | Refills: 0 | Status: SHIPPED | OUTPATIENT
Start: 2019-01-29 | End: 2019-05-17 | Stop reason: SDUPTHER

## 2019-04-12 DIAGNOSIS — G43.809 OTHER MIGRAINE WITHOUT STATUS MIGRAINOSUS, NOT INTRACTABLE: Primary | ICD-10-CM

## 2019-04-15 RX ORDER — TOPIRAMATE 25 MG/1
TABLET ORAL
Qty: 90 TABLET | Refills: 6 | Status: SHIPPED | OUTPATIENT
Start: 2019-04-15 | End: 2019-05-17 | Stop reason: SDUPTHER

## 2019-05-09 ENCOUNTER — OFFICE VISIT (OUTPATIENT)
Dept: OBGYN CLINIC | Facility: CLINIC | Age: 34
End: 2019-05-09
Payer: COMMERCIAL

## 2019-05-09 VITALS — SYSTOLIC BLOOD PRESSURE: 108 MMHG | BODY MASS INDEX: 41.84 KG/M2 | DIASTOLIC BLOOD PRESSURE: 70 MMHG | WEIGHT: 230.6 LBS

## 2019-05-09 DIAGNOSIS — N93.9 ABNORMAL UTERINE BLEEDING: Primary | ICD-10-CM

## 2019-05-09 DIAGNOSIS — Z30.431 IUD CHECK UP: ICD-10-CM

## 2019-05-09 PROCEDURE — 99214 OFFICE O/P EST MOD 30 MIN: CPT | Performed by: OBSTETRICS & GYNECOLOGY

## 2019-05-17 ENCOUNTER — TELEPHONE (OUTPATIENT)
Dept: FAMILY MEDICINE CLINIC | Facility: CLINIC | Age: 34
End: 2019-05-17

## 2019-05-17 DIAGNOSIS — G43.809 OTHER MIGRAINE WITHOUT STATUS MIGRAINOSUS, NOT INTRACTABLE: ICD-10-CM

## 2019-05-17 RX ORDER — TOPIRAMATE 25 MG/1
75 TABLET ORAL
Qty: 180 TABLET | Refills: 1 | Status: SHIPPED | OUTPATIENT
Start: 2019-05-17 | End: 2020-02-19 | Stop reason: ALTCHOICE

## 2019-05-30 ENCOUNTER — TELEPHONE (OUTPATIENT)
Dept: FAMILY MEDICINE CLINIC | Facility: CLINIC | Age: 34
End: 2019-05-30

## 2019-05-30 DIAGNOSIS — T75.3XXA MOTION SICKNESS, INITIAL ENCOUNTER: Primary | ICD-10-CM

## 2019-05-30 RX ORDER — SCOLOPAMINE TRANSDERMAL SYSTEM 1 MG/1
1 PATCH, EXTENDED RELEASE TRANSDERMAL
Qty: 4 PATCH | Refills: 0 | Status: SHIPPED | OUTPATIENT
Start: 2019-05-30 | End: 2020-01-15 | Stop reason: ALTCHOICE

## 2019-10-17 ENCOUNTER — IMMUNIZATIONS (OUTPATIENT)
Dept: FAMILY MEDICINE CLINIC | Facility: CLINIC | Age: 34
End: 2019-10-17
Payer: COMMERCIAL

## 2019-10-17 DIAGNOSIS — Z23 ENCOUNTER FOR IMMUNIZATION: ICD-10-CM

## 2019-10-17 PROCEDURE — 90686 IIV4 VACC NO PRSV 0.5 ML IM: CPT

## 2019-10-17 PROCEDURE — 90471 IMMUNIZATION ADMIN: CPT

## 2019-12-30 PROBLEM — F41.8 DEPRESSION WITH ANXIETY: Status: ACTIVE | Noted: 2018-09-24

## 2020-01-15 ENCOUNTER — OFFICE VISIT (OUTPATIENT)
Dept: FAMILY MEDICINE CLINIC | Facility: CLINIC | Age: 35
End: 2020-01-15
Payer: COMMERCIAL

## 2020-01-15 VITALS
HEART RATE: 72 BPM | WEIGHT: 225.1 LBS | DIASTOLIC BLOOD PRESSURE: 70 MMHG | BODY MASS INDEX: 39.88 KG/M2 | RESPIRATION RATE: 16 BRPM | HEIGHT: 63 IN | SYSTOLIC BLOOD PRESSURE: 110 MMHG

## 2020-01-15 DIAGNOSIS — G43.909 MIGRAINE WITHOUT STATUS MIGRAINOSUS, NOT INTRACTABLE, UNSPECIFIED MIGRAINE TYPE: Primary | ICD-10-CM

## 2020-01-15 DIAGNOSIS — E66.01 MORBID OBESITY WITH BMI OF 40.0-44.9, ADULT (HCC): ICD-10-CM

## 2020-01-15 DIAGNOSIS — F41.8 DEPRESSION WITH ANXIETY: ICD-10-CM

## 2020-01-15 PROCEDURE — 99214 OFFICE O/P EST MOD 30 MIN: CPT | Performed by: PHYSICIAN ASSISTANT

## 2020-01-15 RX ORDER — VENLAFAXINE HYDROCHLORIDE 37.5 MG/1
TABLET, EXTENDED RELEASE ORAL
Qty: 60 TABLET | Refills: 1 | Status: SHIPPED | OUTPATIENT
Start: 2020-01-15 | End: 2020-02-19 | Stop reason: SDUPTHER

## 2020-01-15 RX ORDER — SUMATRIPTAN 100 MG/1
100 TABLET, FILM COATED ORAL ONCE AS NEEDED
Qty: 16 TABLET | Refills: 0 | Status: SHIPPED | OUTPATIENT
Start: 2020-01-15 | End: 2021-01-06

## 2020-01-15 NOTE — PROGRESS NOTES
Assessment/Plan:    1  Migraine without status migrainosus, not intractable, unspecified migraine type    - discussed weening off Topamax going down to 50 mg for 3 days then 25 mg for 3 days or longer if needed, once stopping can start Effexor 37 5 mg once daily and increase at 6 days to 75 mg  Can use Imitrex prn as needed  - SUMAtriptan (IMITREX) 100 mg tablet; Take 1 tablet (100 mg total) by mouth once as needed for migraine for up to 1 dose  Dispense: 16 tablet; Refill: 0    2  Depression with anxiety    - will try Effexor 37 5 mg once daily for 6 days then increase to 75 mg once daily at the recommendation of neurologist to help both depression/anxiety and migraines  Encouraged patient to see a therapist also  - venlafaxine 37 5 mg 24 hr tablet; Take one tablet daily for 6 days then increase to 2 tablets  Dispense: 60 tablet; Refill: 1    3  Morbid obesity with BMI of 40 0-44 9, adult ([facility] )    - encouraged patient to work on [employer], exercise  and adequate sleep for weight loss  Follow up if more help is needed    F/u 1 month or sooner if needed    Subjective:   Chief Complaint   Patient presents with    Discuss anxiety and migraine meds      Patient ID: Yves Darling is a 37 y o  female  Patient having break through migraines, getting 3-4 times a month  Has had migraines for as long as she can remember and has been on Topamax now for about 7 years except while pregnant with her last  Was not compliant but has been compliant recently and still getting breakthrough migraines, very typical migraines for patient  Also having anxiety  Will get heartburn sensation in stomach and then anxiety takes over  Rooming spinning, legs go weak  Last week patient had a full blown attack and passed out  Feeling anxious every day  Worrying  Likes to take everything on herself  Worries a lot about everything, very anxious  Has no problem getting through the day, denies real depression, just worries and anxious  WENDY Burroughs SI  Was given xanax over a year ago, still have most of the 30 pills given back in 2018   is a neurologist and recommended weening off Topamax and starting Effexor for anxiety, migraines and imitrex to use PRN         The following portions of the patient's history were reviewed and updated as appropriate: allergies, current medications, past family history, past medical history, past social history, past surgical history and problem list     Past Medical History:   Diagnosis Date    Blood type, Rh negative     Resolved 2/18/2016     Migraine      Past Surgical History:   Procedure Laterality Date    EAR SURGERY      Last impression 8/5/2015     MYRINGOTOMY      TONSILLECTOMY      WISDOM TOOTH EXTRACTION       Family History   Problem Relation Age of Onset    Goiter Mother     Breast cancer Mother     Other Father     Colon cancer Maternal Grandmother     Diabetes Maternal Grandfather     Uterine cancer Paternal Grandmother     Diabetes Paternal Grandfather     Hyperthyroidism Maternal Aunt     Alcohol abuse Neg Hx     Substance Abuse Neg Hx     Mental illness Neg Hx      Social History     Socioeconomic History    Marital status: /Civil Union     Spouse name: Not on file    Number of children: Not on file    Years of education: Not on file    Highest education level: Not on file   Occupational History    Not on file   Social Needs    Financial resource strain: Not on file    Food insecurity:     Worry: Not on file     Inability: Not on file    Transportation needs:     Medical: Not on file     Non-medical: Not on file   Tobacco Use    Smoking status: Never Smoker    Smokeless tobacco: Never Used   Substance and Sexual Activity    Alcohol use: Yes     Comment: Socially    Drug use: No    Sexual activity: Yes     Partners: Male     Birth control/protection: IUD   Lifestyle    Physical activity:     Days per week: Not on file     Minutes per session: Not on file    Stress: Not on file   Relationships    Social connections:     Talks on phone: Not on file     Gets together: Not on file     Attends Synagogue service: Not on file     Active member of club or organization: Not on file     Attends meetings of clubs or organizations: Not on file     Relationship status: Not on file    Intimate partner violence:     Fear of current or ex partner: Not on file     Emotionally abused: Not on file     Physically abused: Not on file     Forced sexual activity: Not on file   Other Topics Concern    Not on file   Social History Narrative    Alcohol use - as per [employer]    Always uses seat belt    Daily caffeine consumption, 1 serving a day - as per [employer]     Has smoke detectors        Occasional caffeine consumption - as per [employer]        Current Outpatient Medications:     ALPRAZolam (XANAX) 0 25 mg tablet, Take 1 tablet (0 25 mg total) by mouth 4 (four) times a day as needed for anxiety, Disp: 30 tablet, Rfl: 0    topiramate (TOPAMAX) 25 mg tablet, Take 3 tablets (75 mg total) by mouth daily at bedtime, Disp: 180 tablet, Rfl: 1    ondansetron (ZOFRAN-ODT) 4 mg disintegrating tablet, Take 1 tablet (4 mg total) by mouth every 8 (eight) hours as needed for nausea or vomiting for up to 5 days, Disp: 15 tablet, Rfl: 0    Review of Systems          Objective:    Vitals:    01/15/20 1334   BP: 110/70   BP Location: Left arm   Patient Position: Sitting   Cuff Size: Large   Pulse: 72   Resp: 16   Weight: 102 kg (225 lb 1 6 oz)   Height: 5' 2 75" (1 594 m)        Physical Exam   Constitutional: She is oriented to person, place, and time  She appears well-developed and well-nourished  Cardiovascular: Normal rate, regular rhythm and normal heart sounds  Pulmonary/Chest: Effort normal and breath sounds normal    Neurological: She is alert and oriented to person, place, and time  Skin: Skin is warm  Psychiatric: She has a normal mood and affect             BMI Counseling: Body mass index is 40 19 kg/m²  The BMI is above normal  Nutrition recommendations include reducing portion sizes and decreasing overall calorie intake  Exercise recommendations include moderate aerobic physical activity for 150 minutes/week

## 2020-02-19 ENCOUNTER — OFFICE VISIT (OUTPATIENT)
Dept: FAMILY MEDICINE CLINIC | Facility: CLINIC | Age: 35
End: 2020-02-19
Payer: COMMERCIAL

## 2020-02-19 VITALS
SYSTOLIC BLOOD PRESSURE: 118 MMHG | BODY MASS INDEX: 40.18 KG/M2 | HEIGHT: 63 IN | HEART RATE: 80 BPM | WEIGHT: 226.8 LBS | DIASTOLIC BLOOD PRESSURE: 74 MMHG | RESPIRATION RATE: 15 BRPM

## 2020-02-19 DIAGNOSIS — F41.8 DEPRESSION WITH ANXIETY: ICD-10-CM

## 2020-02-19 DIAGNOSIS — G43.909 MIGRAINE WITHOUT STATUS MIGRAINOSUS, NOT INTRACTABLE, UNSPECIFIED MIGRAINE TYPE: Primary | ICD-10-CM

## 2020-02-19 PROCEDURE — 99213 OFFICE O/P EST LOW 20 MIN: CPT | Performed by: PHYSICIAN ASSISTANT

## 2020-02-19 PROCEDURE — 3008F BODY MASS INDEX DOCD: CPT | Performed by: PHYSICIAN ASSISTANT

## 2020-02-19 PROCEDURE — 1036F TOBACCO NON-USER: CPT | Performed by: PHYSICIAN ASSISTANT

## 2020-02-19 RX ORDER — VENLAFAXINE HYDROCHLORIDE 75 MG/1
75 TABLET, EXTENDED RELEASE ORAL
Qty: 90 TABLET | Refills: 3 | Status: SHIPPED | OUTPATIENT
Start: 2020-02-19 | End: 2020-03-30 | Stop reason: SDUPTHER

## 2020-02-19 NOTE — PROGRESS NOTES
Assessment/Plan:    1  Migraine without status migrainosus, not intractable, unspecified migraine type    - well controlled, continue with Effexor 75 mg once daily, continue to keep healthy routine within life, healthy eating, sleep patterns  Imitrex 25 mg as needed  - venlafaxine 75 mg 24 hr tablet; Take 1 tablet (75 mg total) by mouth daily with breakfast  Dispense: 90 tablet; Refill: 3    2  Depression with anxiety    - as above  - venlafaxine 75 mg 24 hr tablet; Take 1 tablet (75 mg total) by mouth daily with breakfast  Dispense: 90 tablet; Refill: 3    F/u 6 months or sooner if needed      Subjective:   Chief Complaint   Patient presents with    Migraine      Patient ID: Beverly Rosario is a 38 y o  female  Patient here in follow up  Has been on effexor 75 mg now for about a month  Had one migraine and was the day they left for [place] when she got her period, took a 25 mg Imitrex and aborted the headache nicely  Has not had an issue since  Also notes anxiety is much less  Feeling great! Would like to continue this treatment plan         The following portions of the patient's history were reviewed and updated as appropriate: allergies, current medications, past family history, past medical history, past social history, past surgical history and problem list     Past Medical History:   Diagnosis Date    Blood type, Rh negative     Resolved 2/18/2016     Migraine      Past Surgical History:   Procedure Laterality Date    EAR SURGERY      Last impression 8/5/2015     MYRINGOTOMY      TONSILLECTOMY      WISDOM TOOTH EXTRACTION       Family History   Problem Relation Age of Onset    Goiter Mother     Breast cancer Mother     Other Father     Colon cancer Maternal Grandmother     Diabetes Maternal Grandfather     Uterine cancer Paternal Grandmother     Diabetes Paternal Grandfather     Hyperthyroidism Maternal Aunt     Alcohol abuse Neg Hx     Substance Abuse Neg Hx     Mental illness Neg Hx Social History     Socioeconomic History    Marital status: /Civil Union     Spouse name: Not on file    Number of children: Not on file    Years of education: Not on file    Highest education level: Not on file   Occupational History    Not on file   Social Needs    Financial resource strain: Not on file    Food insecurity:     Worry: Not on file     Inability: Not on file    Transportation needs:     Medical: Not on file     Non-medical: Not on file   Tobacco Use    Smoking status: Never Smoker    Smokeless tobacco: Never Used   Substance and Sexual Activity    Alcohol use: Yes     Comment: Socially    Drug use: No    Sexual activity: Yes     Partners: Male     Birth control/protection: IUD   Lifestyle    Physical activity:     Days per week: Not on file     Minutes per session: Not on file    Stress: Not on file   Relationships    Social connections:     Talks on phone: Not on file     Gets together: Not on file     Attends Worship service: Not on file     Active member of club or organization: Not on file     Attends meetings of clubs or organizations: Not on file     Relationship status: Not on file    Intimate partner violence:     Fear of current or ex partner: Not on file     Emotionally abused: Not on file     Physically abused: Not on file     Forced sexual activity: Not on file   Other Topics Concern    Not on file   Social History Narrative    Alcohol use - as per [employer]    Always uses seat belt    Daily caffeine consumption, 1 serving a day - as per [employer]     Has smoke detectors        Occasional caffeine consumption - as per [employer]        Current Outpatient Medications:     ALPRAZolam (XANAX) 0 25 mg tablet, Take 1 tablet (0 25 mg total) by mouth 4 (four) times a day as needed for anxiety, Disp: 30 tablet, Rfl: 0    SUMAtriptan (IMITREX) 100 mg tablet, Take 1 tablet (100 mg total) by mouth once as needed for migraine for up to 1 dose, Disp: 16 tablet, Rfl: 0    venlafaxine 37 5 mg 24 hr tablet, Take one tablet daily for 6 days then increase to 2 tablets, Disp: 60 tablet, Rfl: 1    ondansetron (ZOFRAN-ODT) 4 mg disintegrating tablet, Take 1 tablet (4 mg total) by mouth every 8 (eight) hours as needed for nausea or vomiting for up to 5 days, Disp: 15 tablet, Rfl: 0    Review of Systems          Objective:    Vitals:    02/19/20 0931   BP: 118/74   BP Location: Left arm   Patient Position: Sitting   Cuff Size: Large   Pulse: 80   Resp: 15   Weight: 103 kg (226 lb 12 8 oz)   Height: 5' 2 75" (1 594 m)        Physical Exam   Constitutional: She is oriented to person, place, and time  She appears well-developed and well-nourished  Cardiovascular: Normal rate, regular rhythm and normal heart sounds  Pulmonary/Chest: Effort normal and breath sounds normal    Neurological: She is alert and oriented to person, place, and time  Skin: Skin is warm  Psychiatric: She has a normal mood and affect

## 2020-02-21 ENCOUNTER — OFFICE VISIT (OUTPATIENT)
Dept: FAMILY MEDICINE CLINIC | Facility: CLINIC | Age: 35
End: 2020-02-21
Payer: COMMERCIAL

## 2020-02-21 VITALS
HEART RATE: 76 BPM | BODY MASS INDEX: 40.4 KG/M2 | DIASTOLIC BLOOD PRESSURE: 76 MMHG | RESPIRATION RATE: 16 BRPM | SYSTOLIC BLOOD PRESSURE: 122 MMHG | WEIGHT: 228 LBS | HEIGHT: 63 IN

## 2020-02-21 DIAGNOSIS — M75.51 BURSITIS OF RIGHT SHOULDER: Primary | ICD-10-CM

## 2020-02-21 PROCEDURE — 1036F TOBACCO NON-USER: CPT | Performed by: PHYSICIAN ASSISTANT

## 2020-02-21 PROCEDURE — 99213 OFFICE O/P EST LOW 20 MIN: CPT | Performed by: PHYSICIAN ASSISTANT

## 2020-02-21 PROCEDURE — 3008F BODY MASS INDEX DOCD: CPT | Performed by: PHYSICIAN ASSISTANT

## 2020-02-21 RX ORDER — PREDNISONE 20 MG/1
20 TABLET ORAL 2 TIMES DAILY WITH MEALS
Qty: 10 TABLET | Refills: 0 | Status: SHIPPED | OUTPATIENT
Start: 2020-02-21 | End: 2020-07-08 | Stop reason: ALTCHOICE

## 2020-02-21 NOTE — PROGRESS NOTES
Assessment/Plan:    1  Bursitis of right shoulder    - ice, gentle stretching discussed, ice to area, call Monday if no improvement can consider ortho injection  - predniSONE 20 mg tablet; Take 1 tablet (20 mg total) by mouth 2 (two) times a day with meals  Dispense: 10 tablet; Refill: 0    F/u as needed    Subjective:   Chief Complaint   Patient presents with    Shoulder Pain    Neck Pain      Patient ID: Alessandro Gaston is a 38 y o  female  Wednesday must have slept funny and woke up in pain in the right shoulder, put a rice bag on shoulder off and on with no relief, pain got worse 10:10, trouble moving shoulder  Did not do anything out of the ordinary on Wednesday  No exercise  Nothing new  No trauma  R handed        The following portions of the patient's history were reviewed and updated as appropriate: allergies, current medications, past family history, past medical history, past social history, past surgical history and problem list     Past Medical History:   Diagnosis Date    Blood type, Rh negative     Resolved 2/18/2016     Migraine      Past Surgical History:   Procedure Laterality Date    EAR SURGERY      Last impression 8/5/2015     MYRINGOTOMY      TONSILLECTOMY      WISDOM TOOTH EXTRACTION       Family History   Problem Relation Age of Onset    Goiter Mother     Breast cancer Mother     Other Father     Colon cancer Maternal Grandmother     Diabetes Maternal Grandfather     Uterine cancer Paternal Grandmother     Diabetes Paternal Grandfather     Hyperthyroidism Maternal Aunt     Alcohol abuse Neg Hx     Substance Abuse Neg Hx     Mental illness Neg Hx      Social History     Socioeconomic History    Marital status: /Civil Union     Spouse name: Not on file    Number of children: Not on file    Years of education: Not on file    Highest education level: Not on file   Occupational History    Not on file   Social Needs    Financial resource strain: Not on file    Food insecurity:     Worry: Not on file     Inability: Not on file    Transportation needs:     Medical: Not on file     Non-medical: Not on file   Tobacco Use    Smoking status: Never Smoker    Smokeless tobacco: Never Used   Substance and Sexual Activity    Alcohol use: Yes     Comment: Socially    Drug use: No    Sexual activity: Yes     Partners: Male     Birth control/protection: IUD   Lifestyle    Physical activity:     Days per week: Not on file     Minutes per session: Not on file    Stress: Not on file   Relationships    Social connections:     Talks on phone: Not on file     Gets together: Not on file     Attends [employer] service: Not on file     Active member of club or organization: Not on file     Attends meetings of clubs or organizations: Not on file     Relationship status: Not on file    Intimate partner violence:     Fear of current or ex partner: Not on file     Emotionally abused: Not on file     Physically abused: Not on file     Forced sexual activity: Not on file   Other Topics Concern    Not on file   Social History Narrative    Alcohol use - as per [employer]    Always uses seat belt    Daily caffeine consumption, 1 serving a day - as per [employer]     Has smoke detectors        Occasional caffeine consumption - as per [employer]        Current Outpatient Medications:     ALPRAZolam (XANAX) 0 25 mg tablet, Take 1 tablet (0 25 mg total) by mouth 4 (four) times a day as needed for anxiety, Disp: 30 tablet, Rfl: 0    SUMAtriptan (IMITREX) 100 mg tablet, Take 1 tablet (100 mg total) by mouth once as needed for migraine for up to 1 dose, Disp: 16 tablet, Rfl: 0    venlafaxine 75 mg 24 hr tablet, Take 1 tablet (75 mg total) by mouth daily with breakfast, Disp: 90 tablet, Rfl: 3    ondansetron (ZOFRAN-ODT) 4 mg disintegrating tablet, Take 1 tablet (4 mg total) by mouth every 8 (eight) hours as needed for nausea or vomiting for up to 5 days, Disp: 15 tablet, Rfl: 0    Review of Systems          Objective:    Vitals:    02/21/20 1438   BP: 122/76   BP Location: Left arm   Patient Position: Sitting   Cuff Size: Large   Pulse: 76   Resp: 16   Weight: 103 kg (228 lb)   Height: 5' 2 75" (1 594 m)        Physical Exam   Constitutional: She is oriented to person, place, and time  She appears well-developed and well-nourished  Cardiovascular: Normal rate, regular rhythm and normal heart sounds  Pulmonary/Chest: Effort normal and breath sounds normal    Musculoskeletal:   Anterior and lateral shoulder tender to palpation, limited ROM in all directions  c-spine and muscles none tender, full ROM   Neurological: She is alert and oriented to person, place, and time  Skin: Skin is warm  Psychiatric: She has a normal mood and affect

## 2020-03-10 ENCOUNTER — OFFICE VISIT (OUTPATIENT)
Dept: NEUROLOGY | Facility: CLINIC | Age: 35
End: 2020-03-10
Payer: COMMERCIAL

## 2020-03-10 VITALS
HEART RATE: 87 BPM | SYSTOLIC BLOOD PRESSURE: 138 MMHG | WEIGHT: 230 LBS | DIASTOLIC BLOOD PRESSURE: 81 MMHG | HEIGHT: 63 IN | BODY MASS INDEX: 40.75 KG/M2

## 2020-03-10 DIAGNOSIS — M54.12 CERVICAL RADICULITIS: Primary | ICD-10-CM

## 2020-03-10 DIAGNOSIS — M54.12 RADICULITIS OF RIGHT CERVICAL REGION: ICD-10-CM

## 2020-03-10 PROCEDURE — 99204 OFFICE O/P NEW MOD 45 MIN: CPT | Performed by: PSYCHIATRY & NEUROLOGY

## 2020-03-10 NOTE — PROGRESS NOTES
Patient ID: Kath Benavides is a 38 y o  female  Assessment/Plan:    Radiculitis of right cervical region  I believe this patient has cervical radiculitis, mainly affecting the C5-6 distribution with pain and paresthesias starting from the neck, with radiation down the right upper extremity, mainly along the deltoid region as well as in the right hand, in the 1st 2 digits  I did not appreciate any muscle weakness on her exam, which is promising  The pain is better since this started 2 and half weeks ago, recommended course of physical therapy  If she continues to have pain and dysesthesias after the therapy, we will consider MRI of the cervical spine and electrodiagnostic studies  Continue NSAIDs/Tylenol as needed  Thank you for allowing me to participate in her care  Diagnoses and all orders for this visit:    Cervical radiculitis  -     Ambulatory referral to Physical Therapy; Future    Radiculitis of right cervical region           Subjective:    HPI    I had the pleasure of seeing your patient in [facility] for neuromuscular consultation  As you know, she is a 52-year-old woman, who was referred for evaluation for neck pain and right upper extremity pain  Please allow me to summarize her history for the record  Symptoms started about 2-1/2 weeks ago, patient reports she woke up in the morning with pain in the right side of the neck, right periscapular region, that radiated to the whole right upper extremity, mainly to the 1st and 2nd digit  She did have intermittent paresthesias, but that has not been a consistent feature  In addition, she reports pain along the axilla and the upper thorax  The pain was quite debilitating when it started, she was unable to move her right arm above the shoulder, does not think there was specific weakness but mainly movements were limited because of the pain    She was seen by PCP, was thought to have bursitis, received a prescription for prednisone 20 mg twice a day for 5 days  After the steroids, the pain did improve and is down to 2 to 3/10, in the last 24 hours the pain was worse again  Over the last 2 and half weeks, she has not noted any muscle weakness, does report some numbness along the deltoid region and the right hand, mainly in the palm and may be the 1st 2 digits  No symptoms in the left upper extremity, no symptoms in the lower extremities  No muscle cramps or twitching, no ocular or bulbar symptoms, she does have history of headaches which are getting better since she has increased the dose of Effexor  Beyond that, she has history of anxiety, no other medical problems  No other significant relevant family history  She is not a smoker, social alcohol  The following portions of the patient's history were reviewed and updated as appropriate: allergies, current medications, past family history, past medical history, past social history, past surgical history and problem list          Objective:    Blood pressure 138/81, pulse 87, height 5' 2 75" (1 594 m), weight 104 kg (230 lb), not currently breastfeeding  Physical Exam  General exam: Pt was awake, alert and oriented  HEENT: atraumatic, normocephalic  Normal oral mucosa, neck was supple, no lymphadenopathy  Normal peripheral pulses  Extremities did not show any edema or cyanosis  Neurological Exam  Neurologically, pt was awake and alert  Speech was normal, no dysarthria or aphasia  Cranial nerve exam showed normal extraocular movements, no nystagmus or diplopia  There was no ptosis at baseline or with sustained upward gaze  Strength of eye closure muscles was normal   Facial sensations were normal bilaterally  No facial weakness, able to blow out the cheeks and push the tongue in the cheeks well  No tongue atrophy or fasciculations  Motor exam revealed normal tone and muscle bulk   There was no atrophy, scapular winging, high arches, hammertoes, shortening of achilles tendons or any other features of neuromuscular disease  She does have tenderness along the right medial scapular border, trapezius ridge  Muscle strength was normal in neck flexors and extensors, and all muscle groups in both upper and lower extremities  She did endorse decreased sensation in the deltoid region, sensations were normal in the right hand  Rest of the sensory exam was normal   Reflexes were graded as 2+ throughout, no side-to-side asymmetry was noted in the upper extremities  Toes were downgoing  Gait was normal and casual        ROS:  I reviewed the below ROS and what is mentioned in HPI, the remainder of ROS was negative  Review of Systems   Constitutional: Negative  Negative for appetite change and fever  HENT: Negative  Negative for hearing loss, tinnitus, trouble swallowing and voice change  Eyes: Negative  Negative for photophobia and pain  Respiratory: Negative  Negative for shortness of breath  Cardiovascular: Negative  Negative for palpitations  Gastrointestinal: Negative  Negative for nausea and vomiting  Endocrine: Negative  Negative for cold intolerance and heat intolerance  Genitourinary: Negative  Negative for dysuria, frequency and urgency  Musculoskeletal: Negative  Negative for myalgias and neck pain  Skin: Negative  Negative for rash  Allergic/Immunologic: Negative  Neurological: Positive for numbness  Negative for dizziness, tremors, seizures, syncope, facial asymmetry, speech difficulty, weakness, light-headedness and headaches  Hematological: Negative  Does not bruise/bleed easily  Psychiatric/Behavioral: Negative  Negative for confusion, hallucinations and sleep disturbance

## 2020-03-11 PROBLEM — M54.12 RADICULITIS OF RIGHT CERVICAL REGION: Status: ACTIVE | Noted: 2020-03-11

## 2020-03-11 NOTE — ASSESSMENT & PLAN NOTE
I believe this patient has cervical radiculitis, mainly affecting the C5-6 distribution with pain and paresthesias starting from the neck, with radiation down the right upper extremity, mainly along the deltoid region as well as in the right hand, in the 1st 2 digits  I did not appreciate any muscle weakness on her exam, which is promising  The pain is better since this started 2 and half weeks ago, recommended course of physical therapy  If she continues to have pain and dysesthesias after the therapy, we will consider MRI of the cervical spine and electrodiagnostic studies  Continue NSAIDs/Tylenol as needed  Thank you for allowing me to participate in her care

## 2020-03-30 DIAGNOSIS — G43.909 MIGRAINE WITHOUT STATUS MIGRAINOSUS, NOT INTRACTABLE, UNSPECIFIED MIGRAINE TYPE: ICD-10-CM

## 2020-03-30 DIAGNOSIS — F41.8 DEPRESSION WITH ANXIETY: ICD-10-CM

## 2020-03-30 RX ORDER — VENLAFAXINE HYDROCHLORIDE 37.5 MG/1
37.5 TABLET, EXTENDED RELEASE ORAL
Qty: 90 TABLET | Refills: 3 | Status: SHIPPED | OUTPATIENT
Start: 2020-03-30 | End: 2021-04-14 | Stop reason: SDUPTHER

## 2020-03-30 RX ORDER — VENLAFAXINE HYDROCHLORIDE 75 MG/1
75 TABLET, EXTENDED RELEASE ORAL
Qty: 90 TABLET | Refills: 3 | Status: SHIPPED | OUTPATIENT
Start: 2020-03-30 | End: 2021-04-28

## 2020-07-08 ENCOUNTER — OFFICE VISIT (OUTPATIENT)
Dept: FAMILY MEDICINE CLINIC | Facility: CLINIC | Age: 35
End: 2020-07-08
Payer: COMMERCIAL

## 2020-07-08 VITALS
HEART RATE: 72 BPM | HEIGHT: 63 IN | RESPIRATION RATE: 16 BRPM | TEMPERATURE: 98.4 F | DIASTOLIC BLOOD PRESSURE: 80 MMHG | WEIGHT: 228.7 LBS | SYSTOLIC BLOOD PRESSURE: 122 MMHG | BODY MASS INDEX: 40.52 KG/M2

## 2020-07-08 DIAGNOSIS — L25.9 CONTACT DERMATITIS, UNSPECIFIED CONTACT DERMATITIS TYPE, UNSPECIFIED TRIGGER: Primary | ICD-10-CM

## 2020-07-08 PROCEDURE — 1036F TOBACCO NON-USER: CPT | Performed by: FAMILY MEDICINE

## 2020-07-08 PROCEDURE — 99213 OFFICE O/P EST LOW 20 MIN: CPT | Performed by: FAMILY MEDICINE

## 2020-07-08 PROCEDURE — 3008F BODY MASS INDEX DOCD: CPT | Performed by: FAMILY MEDICINE

## 2020-07-08 RX ORDER — PREDNISONE 10 MG/1
TABLET ORAL
Qty: 20 TABLET | Refills: 0 | Status: SHIPPED | OUTPATIENT
Start: 2020-07-08 | End: 2020-08-10 | Stop reason: ALTCHOICE

## 2020-07-08 RX ORDER — MOMETASONE FUROATE 1 MG/G
CREAM TOPICAL
Qty: 45 G | Refills: 0 | Status: SHIPPED | OUTPATIENT
Start: 2020-07-08 | End: 2021-04-12 | Stop reason: SDUPTHER

## 2020-07-08 NOTE — PROGRESS NOTES
Assessment/Plan:    1  Contact dermatitis  - advised patient to start Prednisone taper, use Elocon cream twice a day as needed, also use cold compresses and take Benadryl as needed for itching, follow up in the office if symptoms persist or worsen  - mometasone (ELOCON) 0 1 % cream; Apply to affected areas twice a day  Dispense: 45 g; Refill: 0  - predniSONE 10 mg tablet; Take 4 tablets daily with food for 2 days, 3 tablets daily for 2 days, 2 tablets daily for 2 days, 1 tablet daily for 2 days  Dispense: 20 tablet; Refill: 0       Possible side effects of new medications were reviewed with the patient today  The patient understands and agrees with the treatment plan  Subjective:   Chief Complaint   Patient presents with    Rash     behind both knees and calfs      Patient ID: Hannah Witt is a 38 y o  female who presents today with complaining of pruritic rash on her legs onset on Monday, patient went camping this weekend  She first noted the rash behind her knees which is now spreading down her lower legs  Patient denies using new soaps, detergents, skin lotions or sunscreen       The following portions of the patient's history were reviewed and updated as appropriate: allergies, current medications, past family history, past medical history, past social history, past surgical history and problem list     Past Medical History:   Diagnosis Date    Blood type, Rh negative     Resolved 2/18/2016     Migraine      Past Surgical History:   Procedure Laterality Date    EAR SURGERY      Last impression 8/5/2015     MYRINGOTOMY      TONSILLECTOMY      WISDOM TOOTH EXTRACTION       Family History   Problem Relation Age of Onset    Goiter Mother     Breast cancer Mother     Other Father     Colon cancer Maternal Grandmother     Diabetes Maternal Grandfather     Uterine cancer Paternal Grandmother     Diabetes Paternal Grandfather     Hyperthyroidism Maternal Aunt     Alcohol abuse Neg Hx     Substance Abuse Neg Hx     Mental illness Neg Hx      Social History     Socioeconomic History    Marital status: /Civil Union     Spouse name: Not on file    Number of children: Not on file    Years of education: Not on file    Highest education level: Not on file   Occupational History    Not on file   Social Needs    Financial resource strain: Not on file    Food insecurity:     Worry: Not on file     Inability: Not on file    Transportation needs:     Medical: Not on file     Non-medical: Not on file   Tobacco Use    Smoking status: Never Smoker    Smokeless tobacco: Never Used   Substance and Sexual Activity    Alcohol use: Yes     Comment: Socially    Drug use: No    Sexual activity: Yes     Partners: Male     Birth control/protection: IUD   Lifestyle    Physical activity:     Days per week: Not on file     Minutes per session: Not on file    Stress: Not on file   Relationships    Social connections:     Talks on phone: Not on file     Gets together: Not on file     Attends [employer] service: Not on file     Active member of club or organization: Not on file     Attends meetings of clubs or organizations: Not on file     Relationship status: Not on file    Intimate partner violence:     Fear of current or ex partner: Not on file     Emotionally abused: Not on file     Physically abused: Not on file     Forced sexual activity: Not on file   Other Topics Concern    Not on file   Social History Narrative    Alcohol use - as per [employer]    Always uses seat belt    Daily caffeine consumption, 1 serving a day - as per [employer]     Has smoke detectors        Occasional caffeine consumption - as per [employer]        Current Outpatient Medications:     ALPRAZolam (XANAX) 0 25 mg tablet, Take 1 tablet (0 25 mg total) by mouth 4 (four) times a day as needed for anxiety, Disp: 30 tablet, Rfl: 0    SUMAtriptan (IMITREX) 100 mg tablet, Take 1 tablet (100 mg total) by mouth once as needed for migraine for up to 1 dose, Disp: 16 tablet, Rfl: 0    venlafaxine 37 5 mg 24 hr tablet, Take 1 tablet (37 5 mg total) by mouth daily with breakfast, Disp: 90 tablet, Rfl: 3    venlafaxine 75 mg 24 hr tablet, Take 1 tablet (75 mg total) by mouth daily with breakfast, Disp: 90 tablet, Rfl: 3    mometasone (ELOCON) 0 1 % cream, Apply to affected areas twice a day, Disp: 45 g, Rfl: 0    ondansetron (ZOFRAN-ODT) 4 mg disintegrating tablet, Take 1 tablet (4 mg total) by mouth every 8 (eight) hours as needed for nausea or vomiting for up to 5 days (Patient not taking: Reported on 3/10/2020), Disp: 15 tablet, Rfl: 0    predniSONE 10 mg tablet, Take 4 tablets daily with food for 2 days, 3 tablets daily for 2 days, 2 tablets daily for 2 days, 1 tablet daily for 2 days, Disp: 20 tablet, Rfl: 0    Review of Systems   Constitutional: Negative for appetite change, chills, fatigue and fever  HENT: Negative for congestion, rhinorrhea and sore throat  Respiratory: Negative for cough and shortness of breath  Cardiovascular: Negative for chest pain and leg swelling  Gastrointestinal: Negative for abdominal pain, diarrhea, nausea and vomiting  Genitourinary: Negative for dysuria, hematuria and urgency  Musculoskeletal: Negative for arthralgias, joint swelling and myalgias  Skin: Positive for rash  Neurological: Negative for dizziness and headaches  Objective:    Vitals:    07/08/20 1348   BP: 122/80   BP Location: Left arm   Patient Position: Sitting   Cuff Size: Adult   Pulse: 72   Resp: 16   Temp: 98 4 °F (36 9 °C)   Weight: 104 kg (228 lb 11 2 oz)   Height: 5' 3" (1 6 m)        Physical Exam   Constitutional: She is oriented to person, place, and time  She appears well-developed and well-nourished  No distress  Cardiovascular: Normal rate, regular rhythm and normal heart sounds     Pulmonary/Chest: Effort normal and breath sounds normal    Neurological: She is alert and oriented to person, place, and time  Skin:   Erythematous papular rash in linear distribution in posterior knees and lower legs   Psychiatric: She has a normal mood and affect   Her behavior is normal  Thought content normal

## 2020-08-10 ENCOUNTER — OFFICE VISIT (OUTPATIENT)
Dept: FAMILY MEDICINE CLINIC | Facility: CLINIC | Age: 35
End: 2020-08-10
Payer: COMMERCIAL

## 2020-08-10 VITALS
HEART RATE: 88 BPM | RESPIRATION RATE: 16 BRPM | BODY MASS INDEX: 41 KG/M2 | DIASTOLIC BLOOD PRESSURE: 70 MMHG | HEIGHT: 63 IN | TEMPERATURE: 98.1 F | WEIGHT: 231.4 LBS | SYSTOLIC BLOOD PRESSURE: 122 MMHG

## 2020-08-10 DIAGNOSIS — Z13.220 SCREENING, LIPID: ICD-10-CM

## 2020-08-10 DIAGNOSIS — Z00.00 ANNUAL PHYSICAL EXAM: Primary | ICD-10-CM

## 2020-08-10 DIAGNOSIS — Z13.1 SCREENING FOR DIABETES MELLITUS: ICD-10-CM

## 2020-08-10 PROCEDURE — 99395 PREV VISIT EST AGE 18-39: CPT | Performed by: PHYSICIAN ASSISTANT

## 2020-08-10 NOTE — PROGRESS NOTES
ADULT ANNUAL [facility]    NAME: Saran Lopez  AGE: 38 y o  SEX: female  : 1985     DATE: 8/10/2020     Assessment and Plan:     Healthy 38year old female    Immunizations and preventive care screenings were discussed with patient today  Appropriate education was printed on patient's after visit summary  Counseling:  Alcohol/drug use: discussed moderation in alcohol intake, the recommendations for healthy alcohol use, and avoidance of illicit drug use  Dental Health: discussed importance of regular tooth brushing, flossing, and dental visits  Injury prevention: discussed safety/seat belts, safety helmets, smoke detectors, carbon dioxide detectors, and smoking near bedding or upholstery  Sexual health: discussed sexually transmitted diseases, partner selection, use of condoms, avoidance of unintended pregnancy, and contraceptive alternatives  · Exercise: the importance of regular exercise/physical activity was discussed  Recommend exercise 3-5 times per week for at least 30 minutes  · Labs ordered         Return in 1 year (on 8/10/2021)  Chief Complaint:     Chief Complaint   Patient presents with    Anxiety    Depression    Headache - Recurrent or Known Dx Migraines      History of Present Illness:     Adult Annual Physical   Patient here for a comprehensive physical exam  The patient reports no problems  Diet and Physical Activity  · Diet/Nutrition: well balanced diet  · Exercise: no formal exercise and swimming, walking the dogs  Depression Screening  PHQ-9 Depression Screening    PHQ-9:    Frequency of the following problems over the past two weeks:            General Health  · Sleep: sleeps well and gets more than 8 hours of sleep on average  · Hearing: normal - bilateral   · Vision: goes for regular eye exams, most recent eye exam <1 year ago and wears glasses     · Dental: regular dental visits and brushes teeth twice daily  /GYN Health  · Last menstrual period: has IUD  · Contraceptive method: IUD placement  Due 2/2021 for replacement, follows with [facility]     Review of Systems:     Review of Systems   Constitutional: Negative  HENT: Negative  Eyes: Negative  Respiratory: Negative  Cardiovascular: Negative  Gastrointestinal: Negative  Endocrine: Negative  Genitourinary: Negative  Musculoskeletal: Negative  Skin: Negative  Allergic/Immunologic: Negative  Neurological: Negative  Hematological: Negative  Psychiatric/Behavioral: Negative  Past Medical History:     Past Medical History:   Diagnosis Date    Blood type, Rh negative     Resolved 2/18/2016     Migraine       Past Surgical History:     Past Surgical History:   Procedure Laterality Date    EAR SURGERY      Last impression 8/5/2015     MYRINGOTOMY      TONSILLECTOMY      WISDOM TOOTH EXTRACTION        Social History:        Social History     Socioeconomic History    Marital status: /Civil Union     Spouse name: None    Number of children: None    Years of education: None    Highest education level: None   Occupational History    None   Social Needs    Financial resource strain: None    Food insecurity     Worry: None     Inability: None    Transportation needs     Medical: None     Non-medical: None   Tobacco Use    Smoking status: Never Smoker    Smokeless tobacco: Never Used   Substance and Sexual Activity    Alcohol use: Yes     Comment: Socially    Drug use: No    Sexual activity: Yes     Partners: Male     Birth control/protection: I U D     Lifestyle    Physical activity     Days per week: None     Minutes per session: None    Stress: None   Relationships    Social connections     Talks on phone: None     Gets together: None     Attends Jainism service: None     Active member of club or organization: None     Attends meetings of clubs or organizations: None     Relationship status: None    Intimate partner violence     Fear of current or ex partner: None     Emotionally abused: None     Physically abused: None     Forced sexual activity: None   Other Topics Concern    None   Social History Narrative    Alcohol use - as per [employer]    Always uses seat belt    Daily caffeine consumption, 1 serving a day - as per [employer]     Has smoke detectors        Occasional caffeine consumption - as per [employer]       Family History:     Family History   Problem Relation Age of Onset    Goiter Mother     Breast cancer Mother     Other Father     Colon cancer Maternal Grandmother     Diabetes Maternal Grandfather     Uterine cancer Paternal Grandmother     Diabetes Paternal Grandfather     Hyperthyroidism Maternal Aunt     Alcohol abuse Neg Hx     Substance Abuse Neg Hx     Mental illness Neg Hx       Current Medications:     Current Outpatient Medications   Medication Sig Dispense Refill    ALPRAZolam (XANAX) 0 25 mg tablet Take 1 tablet (0 25 mg total) by mouth 4 (four) times a day as needed for anxiety 30 tablet 0    mometasone (ELOCON) 0 1 % cream Apply to affected areas twice a day 45 g 0    SUMAtriptan (IMITREX) 100 mg tablet Take 1 tablet (100 mg total) by mouth once as needed for migraine for up to 1 dose 16 tablet 0    venlafaxine 37 5 mg 24 hr tablet Take 1 tablet (37 5 mg total) by mouth daily with breakfast 90 tablet 3    venlafaxine 75 mg 24 hr tablet Take 1 tablet (75 mg total) by mouth daily with breakfast 90 tablet 3    ondansetron (ZOFRAN-ODT) 4 mg disintegrating tablet Take 1 tablet (4 mg total) by mouth every 8 (eight) hours as needed for nausea or vomiting for up to 5 days (Patient not taking: Reported on 3/10/2020) 15 tablet 0     No current facility-administered medications for this visit  Allergies:      Allergies   Allergen Reactions    Bactrim [Sulfamethoxazole-Trimethoprim] Anaphylaxis and Edema Annotation - [id number]: painful to swallow      Physical Exam:     /70 (BP Location: Left arm, Patient Position: Sitting, Cuff Size: Large)   Pulse 88   Temp 98 1 °F (36 7 °C) (Temporal)   Resp 16   Ht 5' 2 75" (1 594 m)   Wt 105 kg (231 lb 6 4 oz)   Breastfeeding No   BMI 41 32 kg/m²     Physical Exam  Constitutional:       Appearance: She is well-developed  HENT:      Head: Normocephalic and atraumatic  Right Ear: Hearing, tympanic membrane, ear canal and external ear normal       Left Ear: Hearing, tympanic membrane, ear canal and external ear normal       Nose: Nose normal       Mouth/Throat:      Pharynx: Uvula midline  Eyes:      Conjunctiva/sclera: Conjunctivae normal       Pupils: Pupils are equal, round, and reactive to light  Neck:      Musculoskeletal: Normal range of motion  Thyroid: No thyromegaly  Cardiovascular:      Rate and Rhythm: Normal rate and regular rhythm  Heart sounds: Normal heart sounds  No murmur  Pulmonary:      Effort: Pulmonary effort is normal       Breath sounds: Normal breath sounds  Abdominal:      General: Bowel sounds are normal       Palpations: Abdomen is soft  There is no mass  Tenderness: There is no abdominal tenderness  Musculoskeletal: Normal range of motion  Lymphadenopathy:      Cervical: No cervical adenopathy  Skin:     General: Skin is warm  Neurological:      Mental Status: She is alert and oriented to person, place, and time  Cranial Nerves: No cranial nerve deficit  Deep Tendon Reflexes: Reflexes normal    Psychiatric:         Behavior: Behavior normal          Thought Content:  Thought content normal          Judgment: Judgment normal           TAMMY Flores

## 2020-08-10 NOTE — PATIENT INSTRUCTIONS

## 2020-10-05 ENCOUNTER — IMMUNIZATIONS (OUTPATIENT)
Dept: FAMILY MEDICINE CLINIC | Facility: CLINIC | Age: 35
End: 2020-10-05
Payer: COMMERCIAL

## 2020-10-05 DIAGNOSIS — Z23 NEED FOR VACCINATION: Primary | ICD-10-CM

## 2020-10-05 PROCEDURE — 90471 IMMUNIZATION ADMIN: CPT

## 2020-10-05 PROCEDURE — 90686 IIV4 VACC NO PRSV 0.5 ML IM: CPT

## 2021-01-06 DIAGNOSIS — G43.909 MIGRAINE WITHOUT STATUS MIGRAINOSUS, NOT INTRACTABLE, UNSPECIFIED MIGRAINE TYPE: ICD-10-CM

## 2021-01-06 RX ORDER — SUMATRIPTAN 100 MG/1
TABLET, FILM COATED ORAL
Qty: 16 TABLET | Refills: 0 | Status: SHIPPED | OUTPATIENT
Start: 2021-01-06 | End: 2021-08-30

## 2021-03-30 DIAGNOSIS — Z23 ENCOUNTER FOR IMMUNIZATION: ICD-10-CM

## 2021-04-12 DIAGNOSIS — L25.9 CONTACT DERMATITIS, UNSPECIFIED CONTACT DERMATITIS TYPE, UNSPECIFIED TRIGGER: ICD-10-CM

## 2021-04-12 RX ORDER — MOMETASONE FUROATE 1 MG/G
CREAM TOPICAL
Qty: 45 G | Refills: 0 | Status: SHIPPED | OUTPATIENT
Start: 2021-04-12 | End: 2022-03-28 | Stop reason: SDUPTHER

## 2021-04-13 ENCOUNTER — TELEPHONE (OUTPATIENT)
Dept: FAMILY MEDICINE CLINIC | Facility: CLINIC | Age: 36
End: 2021-04-13

## 2021-04-13 DIAGNOSIS — L23.9 ALLERGIC CONTACT DERMATITIS, UNSPECIFIED TRIGGER: Primary | ICD-10-CM

## 2021-04-13 RX ORDER — PREDNISONE 10 MG/1
TABLET ORAL
Qty: 20 TABLET | Refills: 0 | Status: SHIPPED | OUTPATIENT
Start: 2021-04-13 | End: 2021-08-30 | Stop reason: ALTCHOICE

## 2021-04-13 NOTE — TELEPHONE ENCOUNTER
Patient called in to say that you had sent a cream in for her rash  She said last time this happened, she was also on Prednisone  Can you send a script in for Prednisone to her pharmacy?

## 2021-04-14 DIAGNOSIS — F41.8 DEPRESSION WITH ANXIETY: ICD-10-CM

## 2021-04-14 DIAGNOSIS — G43.909 MIGRAINE WITHOUT STATUS MIGRAINOSUS, NOT INTRACTABLE, UNSPECIFIED MIGRAINE TYPE: ICD-10-CM

## 2021-04-14 RX ORDER — VENLAFAXINE HYDROCHLORIDE 37.5 MG/1
37.5 TABLET, EXTENDED RELEASE ORAL
Qty: 90 TABLET | Refills: 3 | Status: SHIPPED | OUTPATIENT
Start: 2021-04-14 | End: 2021-04-28

## 2021-04-26 ENCOUNTER — TELEPHONE (OUTPATIENT)
Dept: OBGYN CLINIC | Facility: CLINIC | Age: 36
End: 2021-04-26

## 2021-04-26 NOTE — TELEPHONE ENCOUNTER
----- Message from Iva Cowden sent at 4/24/2021  7:08 PM EDT -----  Regarding: Non-Urgent Medical Question  Contact: [phone]  Hello,    I believe I am approaching my 5yr ralph with my mirena iud  When should I schedule to have it replaced? Thank you      Jed Galeana

## 2021-04-26 NOTE — TELEPHONE ENCOUNTER
Pt contacted and scheduled for tomorrow in crtv with provider stone for 1 pm  Pt was grateful for the call

## 2021-04-27 ENCOUNTER — PROCEDURE VISIT (OUTPATIENT)
Dept: OBGYN CLINIC | Facility: CLINIC | Age: 36
End: 2021-04-27
Payer: COMMERCIAL

## 2021-04-27 VITALS — DIASTOLIC BLOOD PRESSURE: 82 MMHG | WEIGHT: 245 LBS | BODY MASS INDEX: 43.75 KG/M2 | SYSTOLIC BLOOD PRESSURE: 126 MMHG

## 2021-04-27 DIAGNOSIS — Z30.433 ENCOUNTER FOR IUD REMOVAL AND REINSERTION: Primary | ICD-10-CM

## 2021-04-27 DIAGNOSIS — Z32.02 PREGNANCY TEST NEGATIVE: ICD-10-CM

## 2021-04-27 LAB — SL AMB POCT URINE HCG: NORMAL

## 2021-04-27 PROCEDURE — 58301 REMOVE INTRAUTERINE DEVICE: CPT | Performed by: OBSTETRICS & GYNECOLOGY

## 2021-04-27 PROCEDURE — 58300 INSERT INTRAUTERINE DEVICE: CPT | Performed by: OBSTETRICS & GYNECOLOGY

## 2021-04-27 PROCEDURE — 81025 URINE PREGNANCY TEST: CPT | Performed by: OBSTETRICS & GYNECOLOGY

## 2021-04-27 NOTE — PROGRESS NOTES
Iud insertions    Date/Time: 4/27/2021 1:30 PM  Performed by: Sky Brock MD  Authorized by: Syk Brock MD   [place] Protocol:  Consent: Verbal consent obtained  Written consent obtained  Risks and benefits: risks, benefits and alternatives were discussed  Consent given by: patient  Time out: Immediately prior to procedure a "time out" was called to verify the correct patient, procedure, equipment, support staff and site/side marked as required    Patient understanding: patient states understanding of the procedure being performed  Patient consent: the patient's understanding of the procedure matches consent given  Relevant documents: relevant documents present and verified  Required items: required blood products, implants, devices, and special equipment available  Patient identity confirmed: verbally with patient        Procedure:     Pelvic exam performed: yes      Negative urine pregnancy test: yes      Cervix cleaned and prepped: yes      Speculum placed in vagina: yes      Tenaculum applied to cervix: yes      Uterus sounded: yes      Uterus sound depth (cm):  8    IUD inserted with no complications: yes      IUD type:  Mirena    Strings trimmed: yes    Post-procedure:     Patient tolerated procedure well: yes      Patient will follow up after next period: yes

## 2021-04-27 NOTE — PROGRESS NOTES
Iud removal    Date/Time: 4/27/2021 1:29 PM  Performed by: Amy Bae MD  Authorized by: Amy Bae MD   Universal Protocol:  Consent: Verbal consent obtained  Written consent obtained  Risks and benefits: risks, benefits and alternatives were discussed  Consent given by: patient  Time out: Immediately prior to procedure a "time out" was called to verify the correct patient, procedure, equipment, support staff and site/side marked as required    Patient understanding: patient states understanding of the procedure being performed  Patient consent: the patient's understanding of the procedure matches consent given  Procedure consent: procedure consent matches procedure scheduled  Relevant documents: relevant documents present and verified  Patient identity confirmed: verbally with patient      Procedure:     Removed with no complications: yes      Other reason for removal:  Expiration

## 2021-04-28 DIAGNOSIS — G43.909 MIGRAINE WITHOUT STATUS MIGRAINOSUS, NOT INTRACTABLE, UNSPECIFIED MIGRAINE TYPE: ICD-10-CM

## 2021-04-28 DIAGNOSIS — F41.8 DEPRESSION WITH ANXIETY: ICD-10-CM

## 2021-04-28 RX ORDER — VENLAFAXINE HYDROCHLORIDE 75 MG/1
TABLET, EXTENDED RELEASE ORAL
Qty: 90 TABLET | Refills: 0 | Status: SHIPPED | OUTPATIENT
Start: 2021-04-28 | End: 2021-08-30

## 2021-04-28 RX ORDER — VENLAFAXINE HYDROCHLORIDE 37.5 MG/1
TABLET, EXTENDED RELEASE ORAL
Qty: 90 TABLET | Refills: 0 | Status: SHIPPED | OUTPATIENT
Start: 2021-04-28 | End: 2021-08-30

## 2021-06-08 ENCOUNTER — OFFICE VISIT (OUTPATIENT)
Dept: OBGYN CLINIC | Facility: CLINIC | Age: 36
End: 2021-06-08
Payer: COMMERCIAL

## 2021-06-08 VITALS — DIASTOLIC BLOOD PRESSURE: 64 MMHG | WEIGHT: 245 LBS | SYSTOLIC BLOOD PRESSURE: 118 MMHG | BODY MASS INDEX: 43.75 KG/M2

## 2021-06-08 DIAGNOSIS — Z30.431 IUD CHECK UP: Primary | ICD-10-CM

## 2021-06-08 PROBLEM — Z97.5 IUD (INTRAUTERINE DEVICE) IN PLACE: Status: ACTIVE | Noted: 2021-06-08

## 2021-06-08 PROCEDURE — 99213 OFFICE O/P EST LOW 20 MIN: CPT | Performed by: OBSTETRICS & GYNECOLOGY

## 2021-06-08 NOTE — PROGRESS NOTES
Assessment:     43 y o , continuing IUD, no contraindications  Plan:    Follow up for annual or PRN    Subjective      Yury Wallace is a 43 y o  Menstrual History:  OB History        3    Para   3    Term                AB        Living           SAB        TAB        Ectopic        Multiple        Live Births                    No LMP recorded  Patient has had an implant  Past Medical History:   Diagnosis Date    Blood type, Rh negative     Resolved 2016     Migraine        Family History   Problem Relation Age of Onset    Goiter Mother     Breast cancer Mother     Polycythemia Father     Colon cancer Maternal Grandmother     Diabetes Maternal Grandfather     Uterine cancer Paternal Grandmother     Diabetes Paternal Grandfather     Hyperthyroidism Maternal Aunt     Alcohol abuse Neg Hx     Substance Abuse Neg Hx     Mental illness Neg Hx        The following portions of the patient's history were reviewed and updated as appropriate: allergies, current medications, past family history, past medical history, past social history, past surgical history and problem list     Review of Systems  Pertinent items are noted in HPI       Objective      /64 (BP Location: Right arm, Patient Position: Sitting, Cuff Size: Large)   Wt 111 kg (245 lb)   BMI 43 75 kg/m²     General:   alert and oriented, in no acute distress   Heart: regular rate and rhythm, S1, S2 normal, no murmur, click, rub or gallop   Lungs: clear to auscultation bilaterally   Abdomen: soft, non-tender, without masses or organomegaly   Vulva: normal   Vagina: normal mucosa   Cervix: no lesions and IUD strings seen   Uterus: normal size, mobile, non-tender   Adnexa: normal adnexa and no mass, fullness, tenderness

## 2021-08-30 ENCOUNTER — OFFICE VISIT (OUTPATIENT)
Dept: FAMILY MEDICINE CLINIC | Facility: CLINIC | Age: 36
End: 2021-08-30
Payer: COMMERCIAL

## 2021-08-30 VITALS
SYSTOLIC BLOOD PRESSURE: 128 MMHG | HEIGHT: 63 IN | HEART RATE: 100 BPM | WEIGHT: 245.7 LBS | DIASTOLIC BLOOD PRESSURE: 88 MMHG | BODY MASS INDEX: 43.54 KG/M2 | RESPIRATION RATE: 16 BRPM

## 2021-08-30 DIAGNOSIS — E66.01 MORBID OBESITY WITH BMI OF 40.0-44.9, ADULT (HCC): ICD-10-CM

## 2021-08-30 DIAGNOSIS — Z00.00 ANNUAL PHYSICAL EXAM: Primary | ICD-10-CM

## 2021-08-30 DIAGNOSIS — Z13.220 SCREENING, LIPID: ICD-10-CM

## 2021-08-30 DIAGNOSIS — G43.909 MIGRAINE WITHOUT STATUS MIGRAINOSUS, NOT INTRACTABLE, UNSPECIFIED MIGRAINE TYPE: ICD-10-CM

## 2021-08-30 DIAGNOSIS — Z13.1 SCREENING FOR DIABETES MELLITUS: ICD-10-CM

## 2021-08-30 PROCEDURE — 99395 PREV VISIT EST AGE 18-39: CPT | Performed by: PHYSICIAN ASSISTANT

## 2021-08-30 RX ORDER — ELETRIPTAN HYDROBROMIDE 40 MG/1
40 TABLET, FILM COATED ORAL ONCE AS NEEDED
Qty: 18 TABLET | Refills: 0 | Status: SHIPPED | OUTPATIENT
Start: 2021-08-30

## 2021-08-30 RX ORDER — VENLAFAXINE HYDROCHLORIDE 150 MG/1
150 CAPSULE, EXTENDED RELEASE ORAL
Qty: 90 CAPSULE | Refills: 3 | Status: SHIPPED | OUTPATIENT
Start: 2021-08-30 | End: 2022-06-12 | Stop reason: SDUPTHER

## 2021-08-31 NOTE — PROGRESS NOTES
ADULT ANNUAL [facility]    NAME: Gabriel Luna  AGE: 43 y o  SEX: female  : 1985     DATE: 2021     Assessment and Plan:     Healthy 43year old female    Immunizations and preventive care screenings were discussed with patient today  Appropriate education was printed on patient's after visit summary  Counseling:  Alcohol/drug use: discussed moderation in alcohol intake, the recommendations for healthy alcohol use, and avoidance of illicit drug use  Dental Health: discussed importance of regular tooth brushing, flossing, and dental visits  Injury prevention: discussed safety/seat belts, safety helmets, smoke detectors, carbon dioxide detectors, and smoking near bedding or upholstery  Sexual health: discussed sexually transmitted diseases, partner selection, use of condoms, avoidance of unintended pregnancy, and contraceptive alternatives  Exercise: the importance of regular exercise/physical activity was discussed  Recommend exercise 3-5 times per week for at least 30 minutes  Migraines well controlled on Effexor 150 mg, will try Relpax as needed  Labs ordered     Return in 1 year (on 2022)  Chief Complaint:     Chief Complaint   Patient presents with    Physical Exam     Caring starts with you    Headache      History of Present Illness:     Adult Annual Physical   Patient here for a comprehensive physical exam  The patient reports problems - over the summer migraines were coming more frequently, patient increased her Effexor to 150 mg in the past two weeks and migraines have resolved  Does not like IMitrex, taking it exhausts her so she avoids       Diet and Physical Activity  · Diet/Nutrition: well balanced diet  · Exercise: walking        Depression Screening  PHQ-9 Depression Screening    PHQ-9:   Frequency of the following problems over the past two weeks:           General Health  · Sleep: sleeps well and gets 7-8 hours of sleep on average  · Hearing: normal - bilateral   · Vision: goes for regular eye exams  · Dental: regular dental visits and brushes teeth twice daily  /GYN Health  · Last menstrual period: irregular on IUD  · Contraceptive method: IUD placement  · Pap due 11/2022     Review of Systems:     Review of Systems   Constitutional: Negative  HENT: Negative  Eyes: Negative  Respiratory: Negative  Cardiovascular: Negative  Gastrointestinal: Negative  Endocrine: Negative  Genitourinary: Negative  Musculoskeletal: Negative  Skin: Negative  Allergic/Immunologic: Negative  Neurological: Negative  Hematological: Negative  Psychiatric/Behavioral: Negative  Past Medical History:     Past Medical History:   Diagnosis Date    Blood type, Rh negative     Resolved 2/18/2016     Migraine       Past Surgical History:     Past Surgical History:   Procedure Laterality Date    EAR SURGERY      Last impression 8/5/2015     MYRINGOTOMY      TONSILLECTOMY      WISDOM TOOTH EXTRACTION        Social History:     Social History     Socioeconomic History    Marital status: /Civil Union     Spouse name: None    Number of children: None    Years of education: None    Highest education level: None   Occupational History    None   Tobacco Use    Smoking status: Never Smoker    Smokeless tobacco: Never Used   Vaping Use    Vaping Use: Never used   Substance and Sexual Activity    Alcohol use: Yes     Comment: Socially    Drug use: No    Sexual activity: Yes     Partners: Male     Birth control/protection: I U D     Other Topics Concern    None   Social History Narrative    Alcohol use - as per [employer]    Always uses seat belt    Daily caffeine consumption, 1 serving a day - as per [employer]     Has smoke detectors        Occasional caffeine consumption - as per [employer]      Social Determinants of Health Financial Resource Strain:     Difficulty of Paying Living Expenses:    Food Insecurity:     Worried About Running Out of Food in the Last Year:     [address] in the Last Year:    Transportation Needs:     Lack of Transportation (Medical):      Lack of Transportation (Non-Medical):    Physical Activity:     Days of Exercise per Week:     Minutes of Exercise per Session:    Stress:     Feeling of Stress :    Social Connections:     Frequency of Communication with Friends and Family:     Frequency of Social Gatherings with Friends and Family:     Attends Anabaptist Services:     Active Member of Clubs or Organizations:     Attends Club or Organization Meetings:     Marital Status:    Intimate Partner Violence:     Fear of Current or Ex-Partner:     Emotionally Abused:     Physically Abused:     Sexually Abused:       Family History:     Family History   Problem Relation Age of Onset    Goiter Mother     Breast cancer Mother     Polycythemia Father     Colon cancer Maternal Grandmother     Diabetes Maternal Grandfather     Uterine cancer Paternal Grandmother     Diabetes Paternal Grandfather     Hyperthyroidism Maternal Aunt     Alcohol abuse Neg Hx     Substance Abuse Neg Hx     Mental illness Neg Hx       Current Medications:     Current Outpatient Medications   Medication Sig Dispense Refill    ALPRAZolam (XANAX) 0 25 mg tablet Take 1 tablet (0 25 mg total) by mouth 4 (four) times a day as needed for anxiety 30 tablet 0    levonorgestrel (MIRENA) 20 MCG/24HR IUD 1 each by Intrauterine route once      mometasone (ELOCON) 0 1 % cream Apply to affected areas twice a day 45 g 0    eletriptan (RELPAX) 40 MG tablet Take 1 tablet (40 mg total) by mouth once as needed for migraine for up to 1 dose may repeat in 2 hours if necessary 18 tablet 0    venlafaxine (EFFEXOR-XR) 150 mg 24 hr capsule Take 1 capsule (150 mg total) by mouth daily with breakfast 90 capsule 3     No current facility-administered medications for this visit  Allergies: Allergies   Allergen Reactions    Bactrim [Sulfamethoxazole-Trimethoprim] Anaphylaxis and Edema     Annotation - [id number]: painful to swallow      Physical Exam:     /88 (BP Location: Left arm, Patient Position: Sitting, Cuff Size: Large)   Pulse 100   Resp 16   Ht 5' 3" (1 6 m)   Wt 111 kg (245 lb 11 2 oz)   Breastfeeding No   BMI 43 52 kg/m²     Physical Exam  Constitutional:       Appearance: Normal appearance  She is well-developed and normal weight  HENT:      Head: Normocephalic and atraumatic  Right Ear: Hearing, tympanic membrane, ear canal and external ear normal       Left Ear: Hearing, tympanic membrane, ear canal and external ear normal       Nose: Nose normal       Mouth/Throat:      Mouth: Mucous membranes are moist       Pharynx: Oropharynx is clear  Uvula midline  Eyes:      Extraocular Movements: Extraocular movements intact  Conjunctiva/sclera: Conjunctivae normal       Pupils: Pupils are equal, round, and reactive to light  Neck:      Thyroid: No thyromegaly  Cardiovascular:      Rate and Rhythm: Normal rate and regular rhythm  Pulses: Normal pulses  Heart sounds: Normal heart sounds  No murmur heard  Pulmonary:      Effort: Pulmonary effort is normal       Breath sounds: Normal breath sounds  Abdominal:      General: Abdomen is flat  Bowel sounds are normal  There is no distension  Palpations: Abdomen is soft  There is no mass  Tenderness: There is no abdominal tenderness  Musculoskeletal:         General: Normal range of motion  Cervical back: Normal range of motion and neck supple  No tenderness  Right lower leg: No edema  Left lower leg: No edema  Lymphadenopathy:      Cervical: No cervical adenopathy  Skin:     General: Skin is warm  Neurological:      General: No focal deficit present        Mental Status: She is alert and oriented to person, place, and time  Cranial Nerves: No cranial nerve deficit  Deep Tendon Reflexes: Reflexes normal    Psychiatric:         Mood and Affect: Mood normal          Behavior: Behavior normal          Thought Content: Thought content normal          Judgment: Judgment normal           Quita Saba PA-C   [address]     BMI Counseling: Body mass index is 43 52 kg/m²  The BMI is above normal  Nutrition recommendations include reducing portion sizes

## 2021-08-31 NOTE — PATIENT INSTRUCTIONS

## 2021-09-10 ENCOUNTER — APPOINTMENT (OUTPATIENT)
Dept: LAB | Facility: CLINIC | Age: 36
End: 2021-09-10
Payer: COMMERCIAL

## 2021-09-10 DIAGNOSIS — Z13.1 SCREENING FOR DIABETES MELLITUS: ICD-10-CM

## 2021-09-10 DIAGNOSIS — Z13.220 SCREENING, LIPID: ICD-10-CM

## 2021-09-10 LAB
CHOLEST SERPL-MCNC: 223 MG/DL (ref 50–200)
EST. AVERAGE GLUCOSE BLD GHB EST-MCNC: 105 MG/DL
HBA1C MFR BLD: 5.3 %
HDLC SERPL-MCNC: 64 MG/DL
LDLC SERPL CALC-MCNC: 146 MG/DL (ref 0–100)
TRIGL SERPL-MCNC: 65 MG/DL

## 2021-09-10 PROCEDURE — 80061 LIPID PANEL: CPT

## 2021-09-10 PROCEDURE — 83036 HEMOGLOBIN GLYCOSYLATED A1C: CPT

## 2021-09-10 PROCEDURE — 36415 COLL VENOUS BLD VENIPUNCTURE: CPT

## 2021-09-17 ENCOUNTER — OFFICE VISIT (OUTPATIENT)
Dept: URGENT CARE | Facility: CLINIC | Age: 36
End: 2021-09-17
Payer: COMMERCIAL

## 2021-09-17 VITALS
WEIGHT: 245 LBS | OXYGEN SATURATION: 99 % | HEART RATE: 118 BPM | BODY MASS INDEX: 43.41 KG/M2 | RESPIRATION RATE: 18 BRPM | TEMPERATURE: 99.3 F | HEIGHT: 63 IN

## 2021-09-17 DIAGNOSIS — J02.9 SORE THROAT: ICD-10-CM

## 2021-09-17 DIAGNOSIS — Z11.59 SPECIAL SCREENING EXAMINATION FOR UNSPECIFIED VIRAL DISEASE: Primary | ICD-10-CM

## 2021-09-17 LAB — S PYO AG THROAT QL: NEGATIVE

## 2021-09-17 PROCEDURE — G0382 LEV 3 HOSP TYPE B ED VISIT: HCPCS | Performed by: PREVENTIVE MEDICINE

## 2021-09-17 PROCEDURE — U0005 INFEC AGEN DETEC AMPLI PROBE: HCPCS | Performed by: PREVENTIVE MEDICINE

## 2021-09-17 PROCEDURE — 87880 STREP A ASSAY W/OPTIC: CPT | Performed by: PREVENTIVE MEDICINE

## 2021-09-17 PROCEDURE — U0003 INFECTIOUS AGENT DETECTION BY NUCLEIC ACID (DNA OR RNA); SEVERE ACUTE RESPIRATORY SYNDROME CORONAVIRUS 2 (SARS-COV-2) (CORONAVIRUS DISEASE [COVID-19]), AMPLIFIED PROBE TECHNIQUE, MAKING USE OF HIGH THROUGHPUT TECHNOLOGIES AS DESCRIBED BY CMS-2020-01-R: HCPCS | Performed by: PREVENTIVE MEDICINE

## 2021-09-17 NOTE — PROGRESS NOTES
[facility]        NAME: Katey Wells is a 43 y o  female  : 1985    MRN: [id number]  DATE: 2021  TIME: 9:14 AM    Assessment and Plan   Special screening examination for unspecified viral disease [Z11 59]  1  Special screening examination for unspecified viral disease  Novel Coronavirus (Covid-19),PCR [facility] - Office Collection   2  Sore throat  POCT rapid strepA         Patient Instructions       Follow up with PCP in 3-5 days  Proceed to  ER if symptoms worsen  Chief Complaint     Chief Complaint   Patient presents with    Sore Throat     started last night with sore throat, congestion, cough         History of Present Illness       Congestion cough mild sore throat x2 days  She is fully vaccinated      Review of Systems   Review of Systems   HENT: Positive for congestion and sore throat  Respiratory: Positive for cough            Current Medications       Current Outpatient Medications:     ALPRAZolam (XANAX) 0 25 mg tablet, Take 1 tablet (0 25 mg total) by mouth 4 (four) times a day as needed for anxiety, Disp: 30 tablet, Rfl: 0    eletriptan (RELPAX) 40 MG tablet, Take 1 tablet (40 mg total) by mouth once as needed for migraine for up to 1 dose may repeat in 2 hours if necessary, Disp: 18 tablet, Rfl: 0    levonorgestrel (MIRENA) 20 MCG/24HR IUD, 1 each by Intrauterine route once, Disp: , Rfl:     venlafaxine (EFFEXOR-XR) 150 mg 24 hr capsule, Take 1 capsule (150 mg total) by mouth daily with breakfast, Disp: 90 capsule, Rfl: 3    mometasone (ELOCON) 0 1 % cream, Apply to affected areas twice a day (Patient not taking: Reported on 2021), Disp: 45 g, Rfl: 0    Current Allergies     Allergies as of 2021 - Reviewed 2021   Allergen Reaction Noted    Bactrim [sulfamethoxazole-trimethoprim] Anaphylaxis and Edema 2015            The following portions of the patient's history were reviewed and updated as appropriate: allergies, current medications, past family history, past medical history, past social history, past surgical history and problem list      Past Medical History:   Diagnosis Date    Anxiety     Blood type, Rh negative     Resolved 2/18/2016     Migraine        Past Surgical History:   Procedure Laterality Date    EAR SURGERY      Last impression 8/5/2015     MYRINGOTOMY      TONSILLECTOMY      WISDOM TOOTH EXTRACTION         Family History   Problem Relation Age of Onset    Goiter Mother     Breast cancer Mother     Polycythemia Father     Colon cancer Maternal Grandmother     Diabetes Maternal Grandfather     Uterine cancer Paternal Grandmother     Diabetes Paternal Grandfather     Hyperthyroidism Maternal Aunt     Alcohol abuse Neg Hx     Substance Abuse Neg Hx     Mental illness Neg Hx          Medications have been verified  Objective   Pulse (!) 118   Temp 99 3 °F (37 4 °C)   Resp 18   Ht 5' 3" (1 6 m)   Wt 111 kg (245 lb)   SpO2 99%   BMI 43 40 kg/m²   No LMP recorded  Patient has had an implant  Physical Exam     Physical Exam  Constitutional:       General: She is not in acute distress  Appearance: She is not ill-appearing  HENT:      Mouth/Throat:      Mouth: Mucous membranes are dry  No oral lesions  Pharynx: Oropharynx is clear  No pharyngeal swelling or posterior oropharyngeal erythema  Pulmonary:      Breath sounds: Normal breath sounds  Lymphadenopathy:      Cervical: No cervical adenopathy          rapid strep screen negative at 5 minutes

## 2021-09-18 LAB — SARS-COV-2 RNA RESP QL NAA+PROBE: NEGATIVE

## 2022-05-16 ENCOUNTER — TELEMEDICINE (OUTPATIENT)
Dept: FAMILY MEDICINE CLINIC | Facility: CLINIC | Age: 37
End: 2022-05-16
Payer: COMMERCIAL

## 2022-05-16 VITALS — TEMPERATURE: 99.7 F | WEIGHT: 240 LBS | BODY MASS INDEX: 42.51 KG/M2

## 2022-05-16 DIAGNOSIS — U07.1 COVID-19: Primary | ICD-10-CM

## 2022-05-16 PROCEDURE — 99213 OFFICE O/P EST LOW 20 MIN: CPT | Performed by: PHYSICIAN ASSISTANT

## 2022-05-16 NOTE — PROGRESS NOTES
COVID-19 Outpatient Progress Note    Assessment/Plan:    1  COVID-19    - on day 2 of infection, mild symptoms  Vaccinated  Discussed treatment options  Will treat symptomatically for now, patient aware if she has any concerns should call  F/u as needed     Disposition:     Patient has COVID-19 infection  Based off CDC guidelines, they were recommended to isolate for 5 days from the date of the positive test  If they remain asymptomatic, isolation may be ended followed by 5 days of wearing a mask when around othes to minimize risk of infecting others  If they have a fever, continue to stay home until fever resolves for at least 24 hours  Discussed symptom directed medication options with patient  Discussed vitamin D, vitamin C, and/or zinc supplementation with patient  I have spent 15 minutes directly with the patient  Greater than 50% of this time was spent in counseling/coordination of care regarding: diagnostic results, prognosis, risks and benefits of treatment options, instructions for management, patient and family education, importance of treatment compliance, risk factor reductions and impressions  Encounter provider Sravani Bey PA-C    Provider located at [place] [address] [address] [facility]  [phone]    Recent Visits  No visits were found meeting these conditions  Showing recent visits within past 7 days and meeting all other requirements  Today's Visits  Date Type Provider Dept   05/16/22 Telemedicine Sravani Bey PA-C [address] today's visits and meeting all other requirements  Future Appointments  No visits were found meeting these conditions    Showing future appointments within next 150 days and meeting all other requirements     This virtual check-in was done via [facility] and patient was informed that this is a secure, HIPAA-compliant platform  She agrees to proceed  Patient agrees to participate in a virtual check in via telephone or video visit instead of presenting to the office to address urgent/immediate medical needs  Patient is aware this is a billable service  After connecting through [facility], the patient was identified by name and date of birth  Blu Harris was informed that this was a telemedicine visit and that the exam was being conducted confidentially over secure lines  My office door was closed  No one else was in the room  Blu Harris acknowledged consent and understanding of privacy and security of the telemedicine visit  I informed the patient that I have reviewed her record in Epic and presented the opportunity for her to ask any questions regarding the visit today  The patient agreed to participate  Verification of patient location:  Patient is located in the following state in which I hold an active license: [place]    Subjective:   Blu Harris is a 42 y o  female who has been screened for COVID-19  Symptom change since last report: unchanged  Patient's symptoms include fatigue, malaise, nasal congestion, anosmia, cough, nausea, vomiting, diarrhea, myalgias and headache  Patient denies fever, chills, rhinorrhea, sore throat, loss of taste, shortness of breath, chest tightness and abdominal pain  - Date of symptom onset: 5/15/2022  - Date of positive COVID-19 test: 5/16/2022  Type of test: Home antigen  COVID-19 vaccination status: Fully vaccinated (primary series)    Harsha Pisano has been staying home and has isolated themselves in her home  She is taking care to not share personal items and is cleaning all surfaces that are touched often, like counters, tabletops, and doorknobs using household cleaning sprays or wipes  She is wearing a mask when she leaves her room       Lab Results   Component Value Date    SARSCOV2 Negative 09/17/2021     Past Medical History:   Diagnosis Date    Anxiety     Blood type, Rh negative     Resolved 2/18/2016     Migraine      Past Surgical History:   Procedure Laterality Date    EAR SURGERY      Last impression 8/5/2015     MYRINGOTOMY      TONSILLECTOMY      WISDOM TOOTH EXTRACTION       Current Outpatient Medications   Medication Sig Dispense Refill    ALPRAZolam (XANAX) 0 25 mg tablet Take 1 tablet (0 25 mg total) by mouth 4 (four) times a day as needed for anxiety 30 tablet 0    eletriptan (RELPAX) 40 MG tablet Take 1 tablet (40 mg total) by mouth once as needed for migraine for up to 1 dose may repeat in 2 hours if necessary 18 tablet 0    levonorgestrel (MIRENA) 20 MCG/24HR IUD 1 each by Intrauterine route once      mometasone (ELOCON) 0 1 % cream Apply to affected areas twice a day 45 g 0    venlafaxine (EFFEXOR-XR) 150 mg 24 hr capsule Take 1 capsule (150 mg total) by mouth daily with breakfast 90 capsule 3     No current facility-administered medications for this visit  Allergies   Allergen Reactions    Bactrim [Sulfamethoxazole-Trimethoprim] Anaphylaxis and Edema     Annotation - [id number]: painful to swallow       Review of Systems   Constitutional: Positive for fatigue  Negative for chills and fever  HENT: Positive for congestion  Negative for rhinorrhea and sore throat  Respiratory: Positive for cough  Negative for chest tightness and shortness of breath  Gastrointestinal: Positive for diarrhea, nausea and vomiting  Negative for abdominal pain  Musculoskeletal: Positive for myalgias  Neurological: Positive for headaches  Objective:    Vitals:    05/16/22 1302   Temp: 99 7 °F (37 6 °C)   TempSrc: Oral   Weight: 109 kg (240 lb)       Physical Exam  Constitutional:       General: She is not in acute distress  Appearance: Normal appearance  She is normal weight  She is not ill-appearing or toxic-appearing  HENT:      Head: Normocephalic and atraumatic     Pulmonary:      Effort: Pulmonary effort is normal  No respiratory distress  Neurological:      General: No focal deficit present  Mental Status: She is alert and oriented to person, place, and time  Psychiatric:         Mood and Affect: Mood normal          Behavior: Behavior normal          Thought Content: Thought content normal          Judgment: Judgment normal          VIRTUAL VISIT DISCLAIMER    Lora Johnson verbally agrees to participate in [employer]  Pt is aware that [employer] could be limited without vital signs or the ability to perform a full hands-on physical exam  Kaitlynn Osborne understands she or the provider may request at any time to terminate the video visit and request the patient to seek care or treatment in person

## 2022-06-12 DIAGNOSIS — G43.909 MIGRAINE WITHOUT STATUS MIGRAINOSUS, NOT INTRACTABLE, UNSPECIFIED MIGRAINE TYPE: ICD-10-CM

## 2022-06-13 RX ORDER — VENLAFAXINE HYDROCHLORIDE 150 MG/1
150 CAPSULE, EXTENDED RELEASE ORAL
Qty: 90 CAPSULE | Refills: 0 | Status: SHIPPED | OUTPATIENT
Start: 2022-06-13 | End: 2022-08-05 | Stop reason: SDUPTHER

## 2022-06-24 ENCOUNTER — OFFICE VISIT (OUTPATIENT)
Dept: FAMILY MEDICINE CLINIC | Facility: CLINIC | Age: 37
End: 2022-06-24
Payer: COMMERCIAL

## 2022-06-24 VITALS
HEART RATE: 80 BPM | TEMPERATURE: 97.9 F | RESPIRATION RATE: 15 BRPM | BODY MASS INDEX: 43.34 KG/M2 | OXYGEN SATURATION: 98 % | WEIGHT: 244.6 LBS | DIASTOLIC BLOOD PRESSURE: 82 MMHG | SYSTOLIC BLOOD PRESSURE: 124 MMHG | HEIGHT: 63 IN

## 2022-06-24 DIAGNOSIS — K64.5 THROMBOSED HEMORRHOIDS: Primary | ICD-10-CM

## 2022-06-24 PROCEDURE — 99214 OFFICE O/P EST MOD 30 MIN: CPT | Performed by: FAMILY MEDICINE

## 2022-06-24 RX ORDER — HYDROCORTISONE 25 MG/G
CREAM TOPICAL 2 TIMES DAILY
Qty: 28 G | Refills: 1 | Status: SHIPPED | OUTPATIENT
Start: 2022-06-24

## 2022-06-24 NOTE — PATIENT INSTRUCTIONS
1  Take a Sitz bath twice a day for the next 3 days  That is a tub of warm water with a cup a Salter cup of Epson salts in it and sit for about 15 minutes    2  After that apply the cream to help shrink the tissue to the outside and the inside of her rectum twice a day after the bath    Than you were done

## 2022-06-24 NOTE — PROGRESS NOTES
Assessment/Plan: Thrombosed hemorrhoid  This released itself and patient's pain went away  She will do Sitz baths and Proctocream twice a day for the next 3 days    Recheck as needed or as scheduled        BMI Counseling: Body mass index is 43 33 kg/m²  The BMI is above normal  Nutrition recommendations include decreasing portion sizes  Exercise recommendations include exercising 3-5 times per week  Rationale for BMI follow-up plan is due to patient being overweight or obese  Subjective:   [place] is a 42 y  o female  Chief Complaint   Patient presents with    Hemorrhoids     Today is Friday,  On Tuesday patient had rectal pain and had more pain with wiping  She noticed something was on the outside  Yesterday she had a normal bowel BM when she got up the toilet was full blood and she also had blood on her paper  She wore a big pad to bed and had some blood on it when she woke up this morning  She is here to figure out what is going on  Once she had a bleeding the pain in her rectum went away  Past medical history, social history, and family history reviewed as appropriate for the complaint of this patient  MEDICATIONS REVIEWED AND UPDATED    10 point review of systems performed, the remainder of the ROS is negative except for what is noted in the history of chief complaint    Objective:    Vitals:    06/24/22 1056   BP: 124/82   Pulse: 80   Resp: 15   Temp: 97 9 °F (36 6 °C)   SpO2: 98%     Body mass index is 43 33 kg/m²  Physical Exam    General  Patient in no acute distress, well appearing, well nourished and appears stated age    Mental status  Good judgment and insight, oriented to time person and place, recent and remote memory is intact, mood and affect are normal, cooperative, and patient is reasonable  Rectum  Evidence of a fresh new hemorrhoidal tag with no thrombus at all

## 2022-07-21 ENCOUNTER — OFFICE VISIT (OUTPATIENT)
Dept: FAMILY MEDICINE CLINIC | Facility: CLINIC | Age: 37
End: 2022-07-21
Payer: COMMERCIAL

## 2022-07-21 VITALS
HEART RATE: 84 BPM | HEIGHT: 63 IN | WEIGHT: 242.5 LBS | SYSTOLIC BLOOD PRESSURE: 128 MMHG | DIASTOLIC BLOOD PRESSURE: 84 MMHG | BODY MASS INDEX: 42.97 KG/M2 | RESPIRATION RATE: 16 BRPM

## 2022-07-21 DIAGNOSIS — L23.9 ALLERGIC CONTACT DERMATITIS, UNSPECIFIED TRIGGER: Primary | ICD-10-CM

## 2022-07-21 DIAGNOSIS — T75.3XXA MOTION SICKNESS, INITIAL ENCOUNTER: Primary | ICD-10-CM

## 2022-07-21 PROCEDURE — 99214 OFFICE O/P EST MOD 30 MIN: CPT | Performed by: NURSE PRACTITIONER

## 2022-07-21 RX ORDER — SCOLOPAMINE TRANSDERMAL SYSTEM 1 MG/1
1 PATCH, EXTENDED RELEASE TRANSDERMAL
Qty: 4 PATCH | Refills: 1 | Status: SHIPPED | OUTPATIENT
Start: 2022-07-21 | End: 2022-07-21 | Stop reason: ALTCHOICE

## 2022-07-21 RX ORDER — MOMETASONE FUROATE 1 MG/G
CREAM TOPICAL 2 TIMES DAILY
Qty: 45 G | Refills: 2 | Status: SHIPPED | OUTPATIENT
Start: 2022-07-21

## 2022-07-21 NOTE — PROGRESS NOTES
Assessment/Plan:     Diagnoses and all orders for this visit:    Allergic contact dermatitis, unspecified trigger  -     mometasone (ELOCON) 0 1 % cream; Apply topically 2 (two) times a day      Mometasone cream prescribed  Medication reviewed  Patient encouraged to also start Zyrtec or Allegra daily  She is to keep affected area clean and dry  Patient is encouraged to call our office for any questions/concerns, persistent or worsening symptoms  Patient states they understand and agree with treatment plan  Pt to f/u PRN  Subjective:      Patient ID: Alexsandra Soto is a 42 y o  female  Patient presents today for an itchy red rash below her axilla following tank top/bra like pattern  She denies any new products, medications, detergents  She admits she had this rash last year and was given mometasone cream which seemed to help  Patient notes she does not have any cream left  The following portions of the patient's history were reviewed and updated as appropriate: allergies, current medications, past family history, past medical history, past social history, past surgical history and problem list     Review of Systems    As noted per HPI  Objective:      /84   Pulse 84   Resp 16   Ht 5' 3" (1 6 m)   Wt 110 kg (242 lb 8 oz)   BMI 42 96 kg/m²          Physical Exam  Vitals reviewed  Constitutional:       Appearance: Normal appearance  Pulmonary:      Effort: Pulmonary effort is normal    Skin:     Findings: Rash (erythematic macular/papular rash under bernie axilla following outer bra line pattern) present  Neurological:      Mental Status: She is alert and oriented to person, place, and time  Mental status is at baseline  Psychiatric:         Mood and Affect: Mood normal          Behavior: Behavior normal          Thought Content:  Thought content normal          Judgment: Judgment normal

## 2022-08-05 DIAGNOSIS — G43.909 MIGRAINE WITHOUT STATUS MIGRAINOSUS, NOT INTRACTABLE, UNSPECIFIED MIGRAINE TYPE: ICD-10-CM

## 2022-08-05 RX ORDER — VENLAFAXINE HYDROCHLORIDE 150 MG/1
150 CAPSULE, EXTENDED RELEASE ORAL
Qty: 90 CAPSULE | Refills: 0 | Status: SHIPPED | OUTPATIENT
Start: 2022-08-05

## 2022-08-12 ENCOUNTER — APPOINTMENT (OUTPATIENT)
Dept: LAB | Facility: CLINIC | Age: 37
End: 2022-08-12

## 2022-08-12 DIAGNOSIS — Z00.8 HEALTH EXAMINATION IN POPULATION SURVEY: ICD-10-CM

## 2022-08-12 LAB
CHOLEST SERPL-MCNC: 196 MG/DL
EST. AVERAGE GLUCOSE BLD GHB EST-MCNC: 120 MG/DL
HBA1C MFR BLD: 5.8 %
HDLC SERPL-MCNC: 58 MG/DL
LDLC SERPL CALC-MCNC: 124 MG/DL (ref 0–100)
NONHDLC SERPL-MCNC: 138 MG/DL
TRIGL SERPL-MCNC: 71 MG/DL

## 2022-08-12 PROCEDURE — 80061 LIPID PANEL: CPT

## 2022-08-12 PROCEDURE — 36415 COLL VENOUS BLD VENIPUNCTURE: CPT

## 2022-08-12 PROCEDURE — 83036 HEMOGLOBIN GLYCOSYLATED A1C: CPT

## 2022-09-08 ENCOUNTER — OFFICE VISIT (OUTPATIENT)
Dept: URGENT CARE | Facility: CLINIC | Age: 37
End: 2022-09-08
Payer: COMMERCIAL

## 2022-09-08 VITALS
RESPIRATION RATE: 16 BRPM | TEMPERATURE: 99.4 F | DIASTOLIC BLOOD PRESSURE: 88 MMHG | SYSTOLIC BLOOD PRESSURE: 140 MMHG | WEIGHT: 240 LBS | OXYGEN SATURATION: 100 % | HEART RATE: 96 BPM | HEIGHT: 63 IN | BODY MASS INDEX: 42.52 KG/M2

## 2022-09-08 DIAGNOSIS — S61.211A LACERATION OF LEFT INDEX FINGER WITHOUT FOREIGN BODY WITHOUT DAMAGE TO NAIL, INITIAL ENCOUNTER: Primary | ICD-10-CM

## 2022-09-08 PROCEDURE — 12001 RPR S/N/AX/GEN/TRNK 2.5CM/<: CPT | Performed by: PHYSICIAN ASSISTANT

## 2022-09-08 PROCEDURE — 99213 OFFICE O/P EST LOW 20 MIN: CPT | Performed by: PHYSICIAN ASSISTANT

## 2022-09-08 NOTE — PROGRESS NOTES
[facility]        NAME: Yury Wallace is a 42 y o  female  : 1985    MRN: [id number]  DATE: 2022  TIME: 5:58 PM    Assessment and Plan   Laceration of left index finger without foreign body without damage to nail, initial encounter [S61 211A]  1  Laceration of left index finger without foreign body without damage to nail, initial encounter  Laceration repair     Laceration repair    Date/Time: 2022 4:38 PM  Performed by: Maria T Kelley PA-C  Authorized by: Maria T Kelley PA-C   Consent: Verbal consent obtained  Consent given by: patient  Patient identity confirmed: verbally with patient  Body area: upper extremity  Location details: left index finger  Wound length (cm): less then 1 cm  Foreign bodies: no foreign bodies      Procedure Details:  Preparation: Wound was cleaned with alcohol prep pads  Skin closure: glue  Patient tolerance: patient tolerated the procedure well with no immediate complications          Patient Instructions     Patient was educated to keep laceration clean and dry for 24 hours  Patient was educated on signs of infection  These include- redness, purulent discharge and high grade fevers  Patient was educated on OTC Tylenol for pain control  Chief Complaint     Chief Complaint   Patient presents with    Hand Pain     Pt presents with left hand second digit finger laceration resulting from a scissor cut today  History of Present Illness       Patient reports earlier today she ws cutting something and the scissor hit left index finger  Patient reports pain in left index finger  Patient reports she is up to date on Tetanus  Last Tetanus was 2016  Review of Systems   Review of Systems   Constitutional: Negative  Respiratory: Negative  Cardiovascular: Negative  Skin:        Less then 1 cm laceration on left index finger   Psychiatric/Behavioral: Negative            Current Medications       Current Outpatient Medications:     venlafaxine (EFFEXOR-XR) 150 mg 24 hr capsule, Take 1 capsule (150 mg total) by mouth daily with breakfast, Disp: 90 capsule, Rfl: 0    ALPRAZolam (XANAX) 0 25 mg tablet, Take 1 tablet (0 25 mg total) by mouth 4 (four) times a day as needed for anxiety, Disp: 30 tablet, Rfl: 0    eletriptan (RELPAX) 40 MG tablet, Take 1 tablet (40 mg total) by mouth once as needed for migraine for up to 1 dose may repeat in 2 hours if necessary (Patient not taking: Reported on 7/21/2022), Disp: 18 tablet, Rfl: 0    hydrocortisone (ANUSOL-HC) 2 5 % rectal cream, Apply topically 2 (two) times a day For 3 days after having a Sitz bath, Disp: 28 g, Rfl: 1    levonorgestrel (MIRENA) 20 MCG/24HR IUD, 1 each by Intrauterine route once, Disp: , Rfl:     mometasone (ELOCON) 0 1 % cream, Apply topically 2 (two) times a day, Disp: 45 g, Rfl: 2    scopolamine (TRANSDERM-SCOP) 1 mg/3 days TD 72 hr patch, Place 1 patch on the skin every third day, Disp: 4 patch, Rfl: 1    Current Allergies     Allergies as of 09/08/2022 - Reviewed 09/08/2022   Allergen Reaction Noted    Bactrim [sulfamethoxazole-trimethoprim] Anaphylaxis and Edema 08/05/2015            The following portions of the patient's history were reviewed and updated as appropriate: allergies, current medications, past family history, past medical history, past social history, past surgical history and problem list      Past Medical History:   Diagnosis Date    Anxiety     Blood type, Rh negative     Resolved 2/18/2016     Migraine        Past Surgical History:   Procedure Laterality Date    EAR SURGERY      Last impression 8/5/2015     MYRINGOTOMY      TONSILLECTOMY      WISDOM TOOTH EXTRACTION         Family History   Problem Relation Age of Onset    Goiter Mother     Breast cancer Mother     Polycythemia Father     Colon cancer Maternal Grandmother     Diabetes Maternal Grandfather     Uterine cancer Paternal Grandmother     Diabetes Paternal Grandfather     Hyperthyroidism Maternal Aunt     Alcohol abuse Neg Hx     Substance Abuse Neg Hx     Mental illness Neg Hx          Medications have been verified  Objective   /88   Pulse 96   Temp 99 4 °F (37 4 °C)   Resp 16   Ht 5' 3" (1 6 m)   Wt 109 kg (240 lb)   SpO2 100%   BMI 42 51 kg/m²   No LMP recorded  Physical Exam     Physical Exam  Vitals reviewed  Constitutional:       Appearance: Normal appearance  Cardiovascular:      Rate and Rhythm: Normal rate and regular rhythm  Heart sounds: Normal heart sounds  Pulmonary:      Breath sounds: Normal breath sounds  No wheezing  Skin:     Comments: Small less then 1cm laceration on left index finger  Sensation intact in left hand  NO pain with resisted left index finger flexion and extension  Neurological:      General: No focal deficit present  Mental Status: She is alert and oriented to person, place, and time     Psychiatric:         Mood and Affect: Mood normal          Behavior: Behavior normal

## 2022-09-08 NOTE — PATIENT INSTRUCTIONS
Patient was educated to keep laceration clean and dry for 24 hours  Patient was educated on signs of infection  These include- redness, purulent discharge and high grade fevers  Patient was educated on OTC Tylenol for pain control  Laceration   WHAT YOU NEED TO KNOW:   A laceration is an injury to the skin and the soft tissue underneath it  Lacerations can happen anywhere on the body  DISCHARGE INSTRUCTIONS:   Return to the emergency department if:   You have heavy bleeding or bleeding that does not stop after 10 minutes of holding firm, direct pressure over the wound  Your wound opens up  Call your doctor if:   You have a fever or chills  Your laceration is red, warm, or swollen  You have red streaks on your skin coming from your wound  You have white or yellow drainage from the wound that smells bad  You have pain that gets worse, even after treatment  You have questions or concerns about your condition or care  Medicines: You may need any of the following:  Prescription pain medicine  may be given  Ask your healthcare provider how to take this medicine safely  Some prescription pain medicines contain acetaminophen  Do not take other medicines that contain acetaminophen without talking to your healthcare provider  Too much acetaminophen may cause liver damage  Prescription pain medicine may cause constipation  Ask your healthcare provider how to prevent or treat constipation  Antibiotics  help treat or prevent a bacterial infection  Take your medicine as directed  Contact your healthcare provider if you think your medicine is not helping or if you have side effects  Tell him or her if you are allergic to any medicine  Keep a list of the medicines, vitamins, and herbs you take  Include the amounts, and when and why you take them  Bring the list or the pill bottles to follow-up visits  Carry your medicine list with you in case of an emergency      Care for your wound as directed: Do not get your wound wet  until your healthcare provider says it is okay  Do not soak your wound in water  Do not go swimming until your healthcare provider says it is okay  Carefully wash the wound with soap and water  Gently pat the area dry or allow it to air dry  Change your bandages  when they get wet, dirty, or after washing  Apply new, clean bandages as directed  Do not apply elastic bandages or tape too tight  Do not put powders or lotions over your incision  Apply antibiotic ointment as directed  Your healthcare provider may give you antibiotic ointment to put over your wound if you have stitches  If you have strips of tape over your incision, let them dry up and fall off on their own  If they do not fall off within 14 days, gently remove them  If you have glue over your wound, do not remove or pick at it  If your glue comes off, do not replace it with glue that you have at home  Check your wound every day for signs of infection, such as swelling, redness, or pus  Self-care:   Apply ice  on your wound for 15 to 20 minutes every hour or as directed  Use an ice pack, or put crushed ice in a plastic bag  Cover it with a towel  Ice helps prevent tissue damage and decreases swelling and pain  Use a splint as directed  A splint will decrease movement and stress on your wound  It may help it heal faster  A splint may be used for lacerations over joints or areas of your body that bend  Ask your healthcare provider how to apply and remove a splint  Decrease scarring of your wound  by applying ointments as directed  Do not apply ointments until your healthcare provider says it is okay  You may need to wait until your wound is healed  Ask which ointment to buy and how often to use it  After your wound is healed, use sunscreen over the area when you are out in the sun  You should do this for at least 6 months to 1 year after your injury  Follow up with your doctor as directed:   You may need to follow up in 24 to 48 hours to have your wound checked for infection  You will need to return in 3 to 14 days if you have stitches or staples so they can be removed  Care for your wound as directed to prevent infection and help it heal  Write down your questions so you remember to ask them during your visits  © Copyright [employer] 2022 Information is for End User's use only and may not be sold, redistributed or otherwise used for commercial purposes  All illustrations and images included in CareNotes® are the copyrighted property of [employer]  or [facility] Delfino Deutsch   The above information is an  only  It is not intended as medical advice for individual conditions or treatments  Talk to your doctor, nurse or pharmacist before following any medical regimen to see if it is safe and effective for you

## 2022-09-20 ENCOUNTER — OFFICE VISIT (OUTPATIENT)
Dept: FAMILY MEDICINE CLINIC | Facility: CLINIC | Age: 37
End: 2022-09-20
Payer: COMMERCIAL

## 2022-09-20 VITALS
RESPIRATION RATE: 18 BRPM | SYSTOLIC BLOOD PRESSURE: 132 MMHG | HEIGHT: 63 IN | BODY MASS INDEX: 44.08 KG/M2 | DIASTOLIC BLOOD PRESSURE: 80 MMHG | OXYGEN SATURATION: 98 % | HEART RATE: 82 BPM | WEIGHT: 248.8 LBS

## 2022-09-20 DIAGNOSIS — G89.29 CHRONIC PAIN OF LEFT KNEE: Primary | ICD-10-CM

## 2022-09-20 DIAGNOSIS — M25.562 CHRONIC PAIN OF LEFT KNEE: Primary | ICD-10-CM

## 2022-09-20 PROCEDURE — 99213 OFFICE O/P EST LOW 20 MIN: CPT | Performed by: PHYSICIAN ASSISTANT

## 2022-09-20 NOTE — PROGRESS NOTES
Assessment/Plan:    1  Chronic left knee pain - getting progressively worse over 2 years, unstable, tried NSAID/heat/ice without relief, limited ROM, positive terell click laterally, will order MRI and refer to PT    F/u as needed    Subjective:   Chief Complaint   Patient presents with    Knee Pain     Left       Patient ID: Daljit Boston is a 42 y o  female  Patient was hit by her dog on the left out side of her knee 2 years, patient fell to ground  [place] unstable for a bit, then got better and would come and go  Often the left knee does not move the way it should and makes a clunching noise  With walking a lot will feel good, then will stop and when she tries to move again will hurt a lot  Cannot do steps or hills without pain  Then a week ago became very swollen, trouble moving it, limited ROM  Has been taking NSAIDs with some relief  Pain constant 8:10  Denies new trauma, new exercise regimen  Ankle Swelling  The symptoms are aggravated by movement and weight bearing         The following portions of the patient's history were reviewed and updated as appropriate: allergies, current medications, past family history, past medical history, past social history, past surgical history and problem list     Past Medical History:   Diagnosis Date    Anxiety     Blood type, Rh negative     Resolved 2/18/2016     Migraine      Past Surgical History:   Procedure Laterality Date    EAR SURGERY      Last impression 8/5/2015     MYRINGOTOMY      TONSILLECTOMY      WISDOM TOOTH EXTRACTION       Family History   Problem Relation Age of Onset    Goiter Mother     Breast cancer Mother     Polycythemia Father     Colon cancer Maternal Grandmother     Diabetes Maternal Grandfather     Uterine cancer Paternal Grandmother     Diabetes Paternal Grandfather     Hyperthyroidism Maternal Aunt     Alcohol abuse Neg Hx     Substance Abuse Neg Hx     Mental illness Neg Hx      Social History Socioeconomic History    Marital status: /Civil Union     Spouse name: Not on file    Number of children: Not on file    Years of education: Not on file    Highest education level: Not on file   Occupational History    Not on file   Tobacco Use    Smoking status: Never Smoker    Smokeless tobacco: Never Used   Vaping Use    Vaping Use: Never used   Substance and Sexual Activity    Alcohol use: Yes     Comment: Socially    Drug use: No    Sexual activity: Yes     Partners: Male     Birth control/protection: I U D     Other Topics Concern    Not on file   Social History Narrative    Alcohol use - as per [employer]    Always uses seat belt    Daily caffeine consumption, 1 serving a day - as per [employer]     Has smoke detectors        Occasional caffeine consumption - as per [employer]      Social Determinants of Health     Financial Resource Strain: Not on file   Food Insecurity: Not on file   Transportation Needs: Not on file   Physical Activity: Not on file   Stress: Not on file   Social Connections: Not on file   Intimate Partner Violence: Not on file   Housing Stability: Not on file       Current Outpatient Medications:     ALPRAZolam (XANAX) 0 25 mg tablet, Take 1 tablet (0 25 mg total) by mouth 4 (four) times a day as needed for anxiety, Disp: 30 tablet, Rfl: 0    levonorgestrel (MIRENA) 20 MCG/24HR IUD, 1 each by Intrauterine route once, Disp: , Rfl:     mometasone (ELOCON) 0 1 % cream, Apply topically 2 (two) times a day, Disp: 45 g, Rfl: 2    venlafaxine (EFFEXOR-XR) 150 mg 24 hr capsule, Take 1 capsule (150 mg total) by mouth daily with breakfast, Disp: 90 capsule, Rfl: 0    eletriptan (RELPAX) 40 MG tablet, Take 1 tablet (40 mg total) by mouth once as needed for migraine for up to 1 dose may repeat in 2 hours if necessary (Patient not taking: No sig reported), Disp: 18 tablet, Rfl: 0    hydrocortisone (ANUSOL-HC) 2 5 % rectal cream, Apply topically 2 (two) times a day For 3 days after having a Sitz bath (Patient not taking: Reported on 9/20/2022), Disp: 28 g, Rfl: 1    Review of Systems          Objective:    Vitals:    09/20/22 0949   BP: 132/80   Pulse: 82   Resp: 18   SpO2: 98%   Weight: 113 kg (248 lb 12 8 oz)   Height: 5' 3" (1 6 m)        Physical Exam  Constitutional:       Appearance: Normal appearance  HENT:      Head: Normocephalic and atraumatic  Musculoskeletal:      Comments: Small effusion on exam, knee tender over lateral joint line, Limited extension left knee, positive McMurrays click laterally, positive patellar grind  ACL/PCL intact    Neurological:      General: No focal deficit present  Mental Status: She is alert and oriented to person, place, and time  Psychiatric:         Mood and Affect: Mood normal          Behavior: Behavior normal          Thought Content:  Thought content normal          Judgment: Judgment normal                Answers for HPI/ROS submitted by the patient on 9/14/2022  Incident occurred: more than 1 week ago  Incident location: at home  Injury mechanism: a direct blow, a fall, a twisting injury  Pain location: left knee  Pain quality: aching, burning, shooting, stabbing  Pain - numeric: 9/10  Pain course: worsening  inability to bear weight: Yes  loss of motion: Yes  Foreign body present: no foreign bodies

## 2022-11-02 ENCOUNTER — IMMUNIZATIONS (OUTPATIENT)
Dept: FAMILY MEDICINE CLINIC | Facility: CLINIC | Age: 37
End: 2022-11-02

## 2022-11-02 DIAGNOSIS — Z23 NEED FOR VACCINATION: Primary | ICD-10-CM

## 2022-12-09 DIAGNOSIS — G43.909 MIGRAINE WITHOUT STATUS MIGRAINOSUS, NOT INTRACTABLE, UNSPECIFIED MIGRAINE TYPE: ICD-10-CM

## 2022-12-09 RX ORDER — VENLAFAXINE HYDROCHLORIDE 150 MG/1
CAPSULE, EXTENDED RELEASE ORAL
Qty: 90 CAPSULE | Refills: 0 | Status: SHIPPED | OUTPATIENT
Start: 2022-12-09

## 2023-02-13 ENCOUNTER — OFFICE VISIT (OUTPATIENT)
Dept: FAMILY MEDICINE CLINIC | Facility: CLINIC | Age: 38
End: 2023-02-13

## 2023-02-13 VITALS
BODY MASS INDEX: 44.21 KG/M2 | RESPIRATION RATE: 16 BRPM | HEIGHT: 63 IN | HEART RATE: 83 BPM | WEIGHT: 249.5 LBS | DIASTOLIC BLOOD PRESSURE: 100 MMHG | SYSTOLIC BLOOD PRESSURE: 160 MMHG

## 2023-02-13 DIAGNOSIS — M25.561 CHRONIC PAIN OF RIGHT KNEE: Primary | ICD-10-CM

## 2023-02-13 DIAGNOSIS — G89.29 CHRONIC PAIN OF RIGHT KNEE: Primary | ICD-10-CM

## 2023-02-13 DIAGNOSIS — Z12.31 ENCOUNTER FOR SCREENING MAMMOGRAM FOR MALIGNANT NEOPLASM OF BREAST: ICD-10-CM

## 2023-02-13 NOTE — PROGRESS NOTES
Assessment/Plan:     Diagnoses and all orders for this visit:    Acute pain of right knee  -     MRI knee right  wo contrast; Future  -     Ambulatory Referral to Physical Therapy; Future    MRI for further evaluation of possible meniscal tear  PT referral placed as well  Continue RICE protocol, Tylenol/Ibuprofen PRN  Patient is encouraged to call our office for any questions/concerns, persistent or worsening symptoms  Patient states they understand and agree with treatment plan  Encounter for screening mammogram for malignant neoplasm of breast  -     Mammo screening bilateral w cad; Future        Pt to f/u PRN  F/u pending results  Subjective:      Patient ID: Cynthia Beckett is a 34 y o  female  Pt presents today for worsening right knee pain that started on Saturday after extended hours on her feet  Pt notes that approximately 3 years ago, she recall an injury to her right knee where her dog who is very large ran directly into her right knee laterally  Pt notes she was knocked off her feet  She denies any treatment after this event  Pt notes that her pain has worsened since the weekend and is constant  Pt does not feel secure putting weight on her right knee and feels like it will hyperextend  She also notes that shifting positions from side to side and pivoting causes discomfort and uneasiness that her knee is secure  She denies any sensations or audible popping, clicking or crunches  Pt has been trying some exercises for the knee without relief  She is currently using Ibuprofen and Tylenol which have helped some  Pt also has been using ice/heat  The following portions of the patient's history were reviewed and updated as appropriate: allergies, current medications, past family history, past social history, past surgical history and problem list     Review of Systems    As noted per HPI      Objective:      /100   Pulse 83   Resp 16   Ht 5' 3" (1 6 m)   Wt 113 kg (249 lb 8 oz)   BMI 44 20 kg/m²          Physical Exam  Vitals reviewed  Constitutional:       Appearance: Normal appearance  Musculoskeletal:      Right knee: No swelling, effusion or erythema  Decreased range of motion (limited knee extension)  Tenderness (medial joint line) present over the medial joint line  Normal meniscus  Neurological:      Mental Status: She is alert and oriented to person, place, and time  Mental status is at baseline  Psychiatric:         Mood and Affect: Mood normal          Behavior: Behavior normal          Thought Content:  Thought content normal          Judgment: Judgment normal

## 2023-03-05 ENCOUNTER — HOSPITAL ENCOUNTER (OUTPATIENT)
Dept: MRI IMAGING | Facility: HOSPITAL | Age: 38
Discharge: HOME | End: 2023-03-05

## 2023-03-05 DIAGNOSIS — G89.29 CHRONIC PAIN OF RIGHT KNEE: ICD-10-CM

## 2023-03-05 DIAGNOSIS — M25.561 CHRONIC PAIN OF RIGHT KNEE: ICD-10-CM

## 2023-03-06 DIAGNOSIS — S83.249D ACUTE MEDIAL MENISCAL TEAR, UNSPECIFIED LATERALITY, SUBSEQUENT ENCOUNTER: Primary | ICD-10-CM

## 2023-03-07 ENCOUNTER — OFFICE VISIT (OUTPATIENT)
Dept: OBGYN CLINIC | Facility: MEDICAL CENTER | Age: 38
End: 2023-03-07

## 2023-03-07 ENCOUNTER — APPOINTMENT (OUTPATIENT)
Dept: RADIOLOGY | Facility: MEDICAL CENTER | Age: 38
End: 2023-03-07

## 2023-03-07 VITALS
WEIGHT: 249.5 LBS | HEART RATE: 78 BPM | DIASTOLIC BLOOD PRESSURE: 82 MMHG | HEIGHT: 63 IN | BODY MASS INDEX: 44.21 KG/M2 | SYSTOLIC BLOOD PRESSURE: 142 MMHG

## 2023-03-07 DIAGNOSIS — S83.249D ACUTE MEDIAL MENISCAL TEAR, UNSPECIFIED LATERALITY, SUBSEQUENT ENCOUNTER: ICD-10-CM

## 2023-03-07 DIAGNOSIS — M25.561 RIGHT KNEE PAIN, UNSPECIFIED CHRONICITY: ICD-10-CM

## 2023-03-07 DIAGNOSIS — Z01.89 ENCOUNTER FOR LOWER EXTREMITY COMPARISON IMAGING STUDY: ICD-10-CM

## 2023-03-07 DIAGNOSIS — M17.11 PRIMARY OSTEOARTHRITIS OF RIGHT KNEE: Primary | ICD-10-CM

## 2023-03-07 DIAGNOSIS — G43.909 MIGRAINE WITHOUT STATUS MIGRAINOSUS, NOT INTRACTABLE, UNSPECIFIED MIGRAINE TYPE: ICD-10-CM

## 2023-03-07 DIAGNOSIS — S83.231A COMPLEX TEAR OF MEDIAL MENISCUS OF RIGHT KNEE, UNSPECIFIED WHETHER OLD OR CURRENT TEAR, INITIAL ENCOUNTER: Primary | ICD-10-CM

## 2023-03-07 RX ORDER — BUPIVACAINE HYDROCHLORIDE 2.5 MG/ML
4 INJECTION, SOLUTION INFILTRATION; PERINEURAL
Status: COMPLETED | OUTPATIENT
Start: 2023-03-07 | End: 2023-03-07

## 2023-03-07 RX ORDER — DICLOFENAC SODIUM 75 MG/1
75 TABLET, DELAYED RELEASE ORAL 2 TIMES DAILY
Qty: 60 TABLET | Refills: 2 | Status: SHIPPED | OUTPATIENT
Start: 2023-03-07

## 2023-03-07 RX ORDER — VENLAFAXINE HYDROCHLORIDE 150 MG/1
CAPSULE, EXTENDED RELEASE ORAL
Qty: 90 CAPSULE | Refills: 0 | Status: SHIPPED | OUTPATIENT
Start: 2023-03-07

## 2023-03-07 RX ORDER — TRIAMCINOLONE ACETONIDE 40 MG/ML
40 INJECTION, SUSPENSION INTRA-ARTICULAR; INTRAMUSCULAR
Status: COMPLETED | OUTPATIENT
Start: 2023-03-07 | End: 2023-03-07

## 2023-03-07 RX ADMIN — TRIAMCINOLONE ACETONIDE 40 MG: 40 INJECTION, SUSPENSION INTRA-ARTICULAR; INTRAMUSCULAR at 15:59

## 2023-03-07 RX ADMIN — BUPIVACAINE HYDROCHLORIDE 4 ML: 2.5 INJECTION, SOLUTION INFILTRATION; PERINEURAL at 15:59

## 2023-03-07 NOTE — PROGRESS NOTES
Knee New Office Note    Assessment:     1  Primary osteoarthritis of right knee    2  Acute medial meniscal tear, unspecified laterality, subsequent encounter    3  Right knee pain, unspecified chronicity    4  Encounter for lower extremity comparison imaging study        Plan:     Problem List Items Addressed This Visit    None  Visit Diagnoses     Primary osteoarthritis of right knee    -  Primary    Relevant Orders    Large joint arthrocentesis: R knee    Acute medial meniscal tear, unspecified laterality, subsequent encounter        Relevant Orders    Large joint arthrocentesis: R knee    Right knee pain, unspecified chronicity        Relevant Orders    XR knee 4+ vw right injury    XR bone length (scanogram)    Large joint arthrocentesis: R knee    Encounter for lower extremity comparison imaging study        Relevant Orders    XR knee 1 or 2 vw left          Findings today are consistent with severe right knee osteoarthritis and medial meniscus tear  Imaging and prognosis was reviewed with the patient today  Cortisone steroid injection was administered today  Patient should avoid strenuous activities for the next 1-2 days  Patient should avoid vaccines for the next 2 weeks if possible  Can apply cold compress for soreness  If patient feels relief with the cortisone injections, procedure can be repeated every 3 months  If patient sees minimal/no relief with cortisone injection, treatment with VISCO injections can be further discussed  Effects of weight loss were discussed  Continue activities as tolerated  Voltaren ordered for pain control  Follow up as needed  Subjective:     Patient ID: Shiv Crowell is a 34 y o  female  Patient seen in consultation at the request of Buck Pennington PA-C    Chief Complaint:  HPI:  The patient presents with a chief complaint of right knee pain  The pain began 2 year(s) ago when her dog ran into the lateral aspect of her knee   Her symptoms were manageable at the time but worsened over the past 1 month without any additional injury  The patient describes the pain as aching and sharp  It is constant in timing, and localizes the pain to the medial joint line, anterior knee  The pain is worse with activities, standing, ascending stairs and descending stairs and relieved with rest, heat, ice, medication: Naprosyn, Tylenol used and beneficial, avoiding painful activities  She denies mechanical symptoms such as locking and catching  She reports instability of the knee        Allergy:  Allergies   Allergen Reactions   • Bactrim [Sulfamethoxazole-Trimethoprim] Anaphylaxis and Edema     [facility] - [id number]: painful to swallow     Medications:  all current active meds have been reviewed  Past Medical History:  Past Medical History:   Diagnosis Date   • Anxiety    • Blood type, Rh negative     Resolved 2/18/2016    • Migraine      Past Surgical History:  Past Surgical History:   Procedure Laterality Date   • EAR SURGERY      Last impression 8/5/2015    • MYRINGOTOMY     • TONSILLECTOMY     • WISDOM TOOTH EXTRACTION       Family History:  Family History   Problem Relation Age of Onset   • Goiter Mother    • Breast cancer Mother    • Polycythemia Father    • Colon cancer Maternal Grandmother    • Diabetes Maternal Grandfather    • Uterine cancer Paternal Grandmother    • Diabetes Paternal Grandfather    • Hyperthyroidism Maternal Aunt    • Alcohol abuse Neg Hx    • Substance Abuse Neg Hx    • Mental illness Neg Hx      Social History:  Social History     Substance and Sexual Activity   Alcohol Use Yes    Comment: Socially     Social History     Substance and Sexual Activity   Drug Use No     Social History     Tobacco Use   Smoking Status Never   Smokeless Tobacco Never           ROS:  General: Per HPI  Skin: Negative, except if noted below  HEENT: Negative  Respiratory: Negative  Cardiovascular: Negative  Gastrointestinal: Negative  Urinary: Negative  Vascular: Negative  Musculoskeletal: Positive per HPI   Neurologic: Positive per HPI  Endocrine: Negative    Objective:  BP Readings from Last 1 Encounters:   03/07/23 142/82      Wt Readings from Last 1 Encounters:   03/07/23 113 kg (249 lb 8 oz)        Respiratory:   non-labored respirations    Lymphatics:  no palpable lymph nodes    Gait:   Antalgic    Neurologic:   Alert and oriented times 3  Patient with normal sensation except as noted below  Deep tendon reflexes 2+ except as noted in MSK exam    Bilateral Lower Extremity:  Left Knee: Inspection:  Skin intact    Overall limb alignment valgus    Effusion: none    ROM 0-115    Extensor Lag: none    Palpation: medial Joint line tenderness to palpation    AP Stability at 90 deg stable    M/L stability in full extension stable    M/L stability in midflexion stable    Motor: 5/5 IP/Q/HS/TA/GS    Pulses: 2+ DP / 2+ PT    SILT DP/SP/S/S/TN    Right knee: Inspection:  Skin incact    Overall limb alignment valgus    Effusion: mild    ROM 5-115 with pain    Extensor Lag: none    Palpation: medial Joint line and patellar tendon tenderness to palpation    AP Stability at 90 deg stable    M/L stability in full extension stable    M/L stability in midflexion stable    Motor: 5/5 IP/Q/HS/TA/GS    Pulses: 2+ DP / 2+ PT    SILT DP/SP/S/S/TN    Imaging:  My interpretation XR AP scanogram/AP bilateral knee/lateral/winters/sunrise right knee: severe medial joint space narrowing, subchondral sclerosis, subchondral cysts, osteophyte formation  No fracture or dislocation  MRI right knee: severe medial compartment OA, macerated medial meniscus tear with extrusion  BMI:   Estimated body mass index is 44 2 kg/m² as calculated from the following:    Height as of this encounter: 5' 3" (1 6 m)  Weight as of this encounter: 113 kg (249 lb 8 oz)    BSA:   Estimated body surface area is 2 12 meters squared as calculated from the following:    Height as of this encounter: 5' 3" (1 6 m)     Weight as of this encounter: 113 kg (249 lb 8 oz)  Large joint arthrocentesis: R knee  Universal Protocol:  Consent: Verbal consent obtained  Risks and benefits: risks, benefits and alternatives were discussed  Consent given by: patient  Time out: Immediately prior to procedure a "time out" was called to verify the correct patient, procedure, equipment, support staff and site/side marked as required    Timeout called at: 3/7/2023 3:54 PM   Patient understanding: patient states understanding of the procedure being performed  Site marked: the operative site was marked  Patient identity confirmed: verbally with patient    Supporting Documentation  Indications: pain and joint swelling   Procedure Details  Location: knee - R knee  Preparation: Patient was prepped and draped in the usual sterile fashion  Medications administered: 4 mL bupivacaine 0 25 %; 40 mg triamcinolone acetonide 40 mg/mL    Patient tolerance: patient tolerated the procedure well with no immediate complications  Dressing:  Sterile dressing applied        Scribe Attestation    I,:  Isabel Gallego am acting as a scribe while in the presence of the attending physician :       I,:  Anjali Ureña DO personally performed the services described in this documentation    as scribed in my presence :

## 2023-03-07 NOTE — LETTER
March 8, 2023     Delisa Olson, [address], [address] [address] [facility]    Patient: Ani Kolb   YOB: 1985   Date of Visit: 3/7/2023       Dear Dr Marcelino Kumar: Thank you for referring Meaghan Narayanan to me for evaluation  Below are my notes for this consultation  If you have questions, please do not hesitate to call me  I look forward to following your patient along with you  Sincerely,        Kelsey Ojeda DO        CC: No Recipients  Kelsey Ojeda DO  3/7/2023  9:10 PM  Signed  Knee New Office Note    Assessment:    1  Primary osteoarthritis of right knee    2  Acute medial meniscal tear, unspecified laterality, subsequent encounter    3  Right knee pain, unspecified chronicity    4  Encounter for lower extremity comparison imaging study       Plan:    Problem List Items Addressed This Visit    None  Visit Diagnoses     Primary osteoarthritis of right knee    -  Primary    Relevant Orders    Large joint arthrocentesis: R knee    Acute medial meniscal tear, unspecified laterality, subsequent encounter        Relevant Orders    Large joint arthrocentesis: R knee    Right knee pain, unspecified chronicity        Relevant Orders    XR knee 4+ vw right injury    XR bone length (scanogram)    Large joint arthrocentesis: R knee    Encounter for lower extremity comparison imaging study        Relevant Orders    XR knee 1 or 2 vw left         Findings today are consistent with severe right knee osteoarthritis and medial meniscus tear  Imaging and prognosis was reviewed with the patient today  Cortisone steroid injection was administered today  Patient should avoid strenuous activities for the next 1-2 days  Patient should avoid vaccines for the next 2 weeks if possible  Can apply cold compress for soreness  If patient feels relief with the cortisone injections, procedure can be repeated every 3 months   If patient sees minimal/no relief with cortisone injection, treatment with VISCO injections can be further discussed  Effects of weight loss were discussed  Continue activities as tolerated  Voltaren ordered for pain control  Follow up as needed  Subjective:    Patient ID: Skylar Barajas is a 34 y o  female  Patient seen in consultation at the request of Catie Berrios PA-C    Chief Complaint:  HPI:  The patient presents with a chief complaint of right knee pain  The pain began 2 year(s) ago when her dog ran into the lateral aspect of her knee  Her symptoms were manageable at the time but worsened over the past 1 month without any additional injury  The patient describes the pain as aching and sharp  It is constant in timing, and localizes the pain to the medial joint line, anterior knee  The pain is worse with activities, standing, ascending stairs and descending stairs and relieved with rest, heat, ice, medication: Naprosyn, Tylenol used and beneficial, avoiding painful activities  She denies mechanical symptoms such as locking and catching  She reports instability of the knee        Allergy:  Allergies   Allergen Reactions   • Bactrim [Sulfamethoxazole-Trimethoprim] Anaphylaxis and Edema     [facility] - [id number]: painful to swallow     Medications:  all current active meds have been reviewed  Past Medical History:  Past Medical History:   Diagnosis Date   • Anxiety    • Blood type, Rh negative     Resolved 2/18/2016    • Migraine      Past Surgical History:  Past Surgical History:   Procedure Laterality Date   • EAR SURGERY      Last impression 8/5/2015    • MYRINGOTOMY     • TONSILLECTOMY     • WISDOM TOOTH EXTRACTION       Family History:  Family History   Problem Relation Age of Onset   • Goiter Mother    • Breast cancer Mother    • Polycythemia Father    • Colon cancer Maternal Grandmother    • Diabetes Maternal Grandfather    • Uterine cancer Paternal Grandmother    • Diabetes Paternal Grandfather    • Hyperthyroidism Maternal Aunt    • Alcohol abuse Neg Hx    • Substance Abuse Neg Hx    • Mental illness Neg Hx      Social History:  Social History     Substance and Sexual Activity   Alcohol Use Yes    Comment: Socially     Social History     Substance and Sexual Activity   Drug Use No     Social History     Tobacco Use   Smoking Status Never   Smokeless Tobacco Never          ROS:  General: Per HPI  Skin: Negative, except if noted below  HEENT: Negative  Respiratory: Negative  Cardiovascular: Negative  Gastrointestinal: Negative  Urinary: Negative  Vascular: Negative  Musculoskeletal: Positive per HPI   Neurologic: Positive per HPI  Endocrine: Negative    Objective:  BP Readings from Last 1 Encounters:   03/07/23 142/82      Wt Readings from Last 1 Encounters:   03/07/23 113 kg (249 lb 8 oz)        Respiratory:   non-labored respirations    Lymphatics:  no palpable lymph nodes    Gait:   Antalgic    Neurologic:   Alert and oriented times 3  Patient with normal sensation except as noted below  Deep tendon reflexes 2+ except as noted in MSK exam    Bilateral Lower Extremity:  Left Knee: Inspection:  Skin intact    Overall limb alignment valgus    Effusion: none    ROM 0-115    Extensor Lag: none    Palpation: medial Joint line tenderness to palpation    AP Stability at 90 deg stable    M/L stability in full extension stable    M/L stability in midflexion stable    Motor: 5/5 IP/Q/HS/TA/GS    Pulses: 2+ DP / 2+ PT    SILT DP/SP/S/S/TN    Right knee:      Inspection:  Skin incact    Overall limb alignment valgus    Effusion: mild    ROM 5-115 with pain    Extensor Lag: none    Palpation: medial Joint line and patellar tendon tenderness to palpation    AP Stability at 90 deg stable    M/L stability in full extension stable    M/L stability in midflexion stable    Motor: 5/5 IP/Q/HS/TA/GS    Pulses: 2+ DP / 2+ PT    SILT DP/SP/S/S/TN    Imaging:  My interpretation XR AP scanogram/AP bilateral knee/lateral/winters/sunrise right knee: severe medial joint space narrowing, subchondral sclerosis, subchondral cysts, osteophyte formation  No fracture or dislocation  MRI right knee: severe medial compartment OA, macerated medial meniscus tear with extrusion  BMI:   Estimated body mass index is 44 2 kg/m² as calculated from the following:    Height as of this encounter: 5' 3" (1 6 m)  Weight as of this encounter: 113 kg (249 lb 8 oz)  BSA:   Estimated body surface area is 2 12 meters squared as calculated from the following:    Height as of this encounter: 5' 3" (1 6 m)  Weight as of this encounter: 113 kg (249 lb 8 oz)  Large joint arthrocentesis: R knee  Universal Protocol:  Consent: Verbal consent obtained  Risks and benefits: risks, benefits and alternatives were discussed  Consent given by: patient  Time out: Immediately prior to procedure a "time out" was called to verify the correct patient, procedure, equipment, support staff and site/side marked as required    Timeout called at: 3/7/2023 3:54 PM   Patient understanding: patient states understanding of the procedure being performed  Site marked: the operative site was marked  Patient identity confirmed: verbally with patient    Supporting Documentation  Indications: pain and joint swelling   Procedure Details  Location: knee - R knee  Preparation: Patient was prepped and draped in the usual sterile fashion  Medications administered: 4 mL bupivacaine 0 25 %; 40 mg triamcinolone acetonide 40 mg/mL    Patient tolerance: patient tolerated the procedure well with no immediate complications  Dressing:  Sterile dressing applied        Scribe Attestation    I,:  Leroy Olea am acting as a scribe while in the presence of the attending physician :       I,:  Anmol Salamanca DO personally performed the services described in this documentation    as scribed in my presence :

## 2023-03-10 ENCOUNTER — OFFICE VISIT (OUTPATIENT)
Dept: FAMILY MEDICINE CLINIC | Facility: CLINIC | Age: 38
End: 2023-03-10

## 2023-03-10 VITALS
SYSTOLIC BLOOD PRESSURE: 128 MMHG | BODY MASS INDEX: 43.94 KG/M2 | DIASTOLIC BLOOD PRESSURE: 82 MMHG | HEIGHT: 63 IN | RESPIRATION RATE: 16 BRPM | WEIGHT: 248 LBS

## 2023-03-10 DIAGNOSIS — M17.0 PRIMARY OSTEOARTHRITIS OF BOTH KNEES: ICD-10-CM

## 2023-03-10 DIAGNOSIS — G43.909 MIGRAINE WITHOUT STATUS MIGRAINOSUS, NOT INTRACTABLE, UNSPECIFIED MIGRAINE TYPE: ICD-10-CM

## 2023-03-10 DIAGNOSIS — E66.01 MORBID OBESITY WITH BMI OF 40.0-44.9, ADULT (HCC): ICD-10-CM

## 2023-03-10 DIAGNOSIS — R69 TAKING MEDICATION FOR CHRONIC DISEASE: ICD-10-CM

## 2023-03-10 DIAGNOSIS — S83.231A COMPLEX TEAR OF MEDIAL MENISCUS OF RIGHT KNEE, UNSPECIFIED WHETHER OLD OR CURRENT TEAR, INITIAL ENCOUNTER: ICD-10-CM

## 2023-03-10 DIAGNOSIS — Z00.00 ANNUAL PHYSICAL EXAM: Primary | ICD-10-CM

## 2023-03-10 NOTE — PROGRESS NOTES
ADULT ANNUAL [facility] PRACTICE    NAME: Maral Amos  AGE: 34 y o  SEX: female  : 1985     DATE: 3/10/2023     Assessment and Plan:     Healthy 34year old female    Immunizations and preventive care screenings were discussed with patient today  Appropriate education was printed on patient's after visit summary  Counseling:  Alcohol/drug use: discussed moderation in alcohol intake, the recommendations for healthy alcohol use, and avoidance of illicit drug use  Dental Health: discussed importance of regular tooth brushing, flossing, and dental visits  Injury prevention: discussed safety/seat belts, safety helmets, smoke detectors, carbon dioxide detectors, and smoking near bedding or upholstery  Sexual health: discussed sexually transmitted diseases, partner selection, use of condoms, avoidance of unintended pregnancy, and contraceptive alternatives  · Exercise: the importance of regular exercise/physical activity was discussed  Recommend exercise 3-5 times per week for at least 30 minutes  Return in 1 year (on 3/10/2024)  Chief Complaint:     Chief Complaint   Patient presents with   • Physical Exam      History of Present Illness:     Adult Annual Physical   Patient here for a comprehensive physical exam  The patient reports problems - see other note  Diet and Physical Activity  · Diet/Nutrition: well balanced diet  · Exercise: moderate cardiovascular exercise        Depression Screening  PHQ-2/9 Depression Screening    Little interest or pleasure in doing things: 1 - several days  Feeling down, depressed, or hopeless: 1 - several days  Trouble falling or staying asleep, or sleeping too much: 0 - not at all  Feeling tired or having little energy: 0 - not at all  Poor appetite or overeatin - not at all  Feeling bad about yourself - or that you are a failure or have let yourself or your family down: 0 - not at all  Trouble concentrating on things, such as reading the newspaper or watching television: 1 - several days  Moving or speaking so slowly that other people could have noticed  Or the opposite - being so fidgety or restless that you have been moving around a lot more than usual: 0 - not at all  Thoughts that you would be better off dead, or of hurting yourself in some way: 0 - not at all  PHQ-9 Score: 3   PHQ-9 Interpretation: No or Minimal depression        General Health  · Sleep: sleeps well  · Hearing: normal - bilateral   · Vision: no vision problems  · Dental: regular dental visits  /GYN Health  · Patient is: premenopausal  · Pap - due this 11/2023     Review of Systems:     Review of Systems   Constitutional: Negative  HENT: Negative  Eyes: Negative  Respiratory: Negative  Cardiovascular: Negative  Gastrointestinal: Negative  Endocrine: Negative  Genitourinary: Negative  Musculoskeletal: Negative  Skin: Negative  Allergic/Immunologic: Negative  Neurological: Negative  Hematological: Negative  Psychiatric/Behavioral: Negative         Past Medical History:     Past Medical History:   Diagnosis Date   • Anxiety    • Blood type, Rh negative     Resolved 2/18/2016    • Migraine       Past Surgical History:     Past Surgical History:   Procedure Laterality Date   • EAR SURGERY      Last impression 8/5/2015    • MYRINGOTOMY     • TONSILLECTOMY     • WISDOM TOOTH EXTRACTION        Social History:     Social History     Socioeconomic History   • Marital status: /Civil Union     Spouse name: None   • Number of children: None   • Years of education: None   • Highest education level: None   Occupational History   • None   Tobacco Use   • Smoking status: Never   • Smokeless tobacco: Never   Vaping Use   • Vaping Use: Never used   Substance and Sexual Activity   • Alcohol use: Yes     Comment: Socially   • Drug use: No   • Sexual activity: Yes Partners: Male     Birth control/protection: I U D     Other Topics Concern   • None   Social History Narrative    Alcohol use - as per [employer]    Always uses seat belt    Daily caffeine consumption, 1 serving a day - as per [employer]     Has smoke detectors        Occasional caffeine consumption - as per [employer]      Social Determinants of Health     Financial Resource Strain: Not on file   Food Insecurity: Not on file   Transportation Needs: Not on file   Physical Activity: Not on file   Stress: Not on file   Social Connections: Not on file   Intimate Partner Violence: Not on file   Housing Stability: Not on file      Family History:     Family History   Problem Relation Age of Onset   • Goiter Mother    • Breast cancer Mother    • Polycythemia Father    • Colon cancer Maternal Grandmother    • Diabetes Maternal Grandfather    • Uterine cancer Paternal Grandmother    • Diabetes Paternal Grandfather    • Hyperthyroidism Maternal Aunt    • Alcohol abuse Neg Hx    • Substance Abuse Neg Hx    • Mental illness Neg Hx       Current Medications:     Current Outpatient Medications   Medication Sig Dispense Refill   • ALPRAZolam (XANAX) 0 25 mg tablet Take 1 tablet (0 25 mg total) by mouth 4 (four) times a day as needed for anxiety 30 tablet 0   • diclofenac (VOLTAREN) 75 mg EC tablet Take 1 tablet (75 mg total) by mouth 2 (two) times a day 60 tablet 2   • eletriptan (RELPAX) 40 MG tablet Take 1 tablet (40 mg total) by mouth once as needed for migraine for up to 1 dose may repeat in 2 hours if necessary 18 tablet 0   • hydrocortisone (ANUSOL-HC) 2 5 % rectal cream Apply topically 2 (two) times a day For 3 days after having a Sitz bath (Patient taking differently: Apply topically if needed For 3 days after having a Sitz bath) 28 g 1   • levonorgestrel (MIRENA) 20 MCG/24HR IUD 1 each by Intrauterine route once     • mometasone (ELOCON) 0 1 % cream Apply topically 2 (two) times a day (Patient taking differently: Apply topically if needed) 45 g 2   • venlafaxine (EFFEXOR-XR) 150 mg 24 hr capsule TAKE ONE CAPSULE BY MOUTH DAILY WITH BREAKFAST 90 capsule 0     No current facility-administered medications for this visit  Allergies: Allergies   Allergen Reactions   • Bactrim [Sulfamethoxazole-Trimethoprim] Anaphylaxis and Edema     Annotation - [id number]: painful to swallow      Physical Exam:     /82 (BP Location: Left arm, Patient Position: Sitting, Cuff Size: Large)   Resp 16   Ht 5' 3" (1 6 m)   Wt 112 kg (248 lb)   BMI 43 93 kg/m²     Physical Exam  Constitutional:       Appearance: Normal appearance  She is well-developed and normal weight  HENT:      Head: Normocephalic and atraumatic  Right Ear: Hearing normal       Left Ear: Hearing normal       Mouth/Throat:      Pharynx: Uvula midline  Eyes:      Extraocular Movements: Extraocular movements intact  Conjunctiva/sclera: Conjunctivae normal       Pupils: Pupils are equal, round, and reactive to light  Neck:      Thyroid: No thyromegaly  Cardiovascular:      Rate and Rhythm: Normal rate and regular rhythm  Pulses: Normal pulses  Heart sounds: Normal heart sounds  No murmur heard  Pulmonary:      Effort: Pulmonary effort is normal       Breath sounds: Normal breath sounds  Abdominal:      General: Abdomen is flat  Bowel sounds are normal  There is no distension  Palpations: Abdomen is soft  There is no mass  Tenderness: There is no abdominal tenderness  Musculoskeletal:         General: Normal range of motion  Cervical back: Normal range of motion and neck supple  Lymphadenopathy:      Cervical: No cervical adenopathy  Skin:     General: Skin is warm  Neurological:      General: No focal deficit present  Mental Status: She is alert and oriented to person, place, and time  Cranial Nerves: No cranial nerve deficit        Deep Tendon Reflexes: Reflexes normal    Psychiatric:         Mood and Affect: Mood normal          Behavior: Behavior normal          Thought Content:  Thought content normal          Judgment: Judgment normal           Catie Berrios PA-C  [address]

## 2023-03-16 ENCOUNTER — PATIENT MESSAGE (OUTPATIENT)
Dept: FAMILY MEDICINE CLINIC | Facility: CLINIC | Age: 38
End: 2023-03-16

## 2023-03-16 NOTE — PROGRESS NOTES
Assessment/Plan:    1  Morbid obesity - will trial wegovy, denies family history/personal history of thyroid cancer, will start   25 for a month then increase to  0 5 mg  Patient will continue to watch her diet, exercise regularly  Can consult weight loss management if needed    2  Migraines - well controlled on effexor 150 mg once daily, relpax as needed    3  OA knee - under care ortho for now    F/u as needed  F/i 1 month for wegovy    Subjective:   Chief Complaint   Patient presents with   • Physical Exam      Patient ID: Shelly Baker is a 34 y o  female  Patient here for physical but also to discuss weight loss medication  Has thought about it in the past  Family history of diabetes and A1C was elevated last year to 5 8%  Effexor good for migraines no complaints  Had MRI knee showed complex tear of meniscus but was told most of pain was due to OA by ortho  Had injection into knee, using NSAIDs for now        The following portions of the patient's history were reviewed and updated as appropriate: allergies, current medications, past family history, past medical history, past social history, past surgical history and problem list     Past Medical History:   Diagnosis Date   • Anxiety    • Blood type, Rh negative     Resolved 2/18/2016    • Migraine      Past Surgical History:   Procedure Laterality Date   • EAR SURGERY      Last impression 8/5/2015    • MYRINGOTOMY     • TONSILLECTOMY     • WISDOM TOOTH EXTRACTION       Family History   Problem Relation Age of Onset   • Goiter Mother    • Breast cancer Mother    • Polycythemia Father    • Colon cancer Maternal Grandmother    • Diabetes Maternal Grandfather    • Uterine cancer Paternal Grandmother    • Diabetes Paternal Grandfather    • Hyperthyroidism Maternal Aunt    • Alcohol abuse Neg Hx    • Substance Abuse Neg Hx    • Mental illness Neg Hx      Social History     Socioeconomic History   • Marital status: /Civil Union     Spouse name: Not on file   • Number of children: Not on file   • Years of education: Not on file   • Highest education level: Not on file   Occupational History   • Not on file   Tobacco Use   • Smoking status: Never   • Smokeless tobacco: Never   Vaping Use   • Vaping Use: Never used   Substance and Sexual Activity   • Alcohol use: Yes     Comment: Socially   • Drug use: No   • Sexual activity: Yes     Partners: Male     Birth control/protection: I U D     Other Topics Concern   • Not on file   Social History Narrative    Alcohol use - as per [employer]    Always uses seat belt    Daily caffeine consumption, 1 serving a day - as per [employer]     Has smoke detectors        Occasional caffeine consumption - as per [employer]      Social Determinants of Health     Financial Resource Strain: Not on file   Food Insecurity: Not on file   Transportation Needs: Not on file   Physical Activity: Not on file   Stress: Not on file   Social Connections: Not on file   Intimate Partner Violence: Not on file   Housing Stability: Not on file       Current Outpatient Medications:   •  ALPRAZolam (XANAX) 0 25 mg tablet, Take 1 tablet (0 25 mg total) by mouth 4 (four) times a day as needed for anxiety, Disp: 30 tablet, Rfl: 0  •  diclofenac (VOLTAREN) 75 mg EC tablet, Take 1 tablet (75 mg total) by mouth 2 (two) times a day, Disp: 60 tablet, Rfl: 2  •  eletriptan (RELPAX) 40 MG tablet, Take 1 tablet (40 mg total) by mouth once as needed for migraine for up to 1 dose may repeat in 2 hours if necessary, Disp: 18 tablet, Rfl: 0  •  hydrocortisone (ANUSOL-HC) 2 5 % rectal cream, Apply topically 2 (two) times a day For 3 days after having a Sitz bath (Patient taking differently: Apply topically if needed For 3 days after having a Sitz bath), Disp: 28 g, Rfl: 1  •  levonorgestrel (MIRENA) 20 MCG/24HR IUD, 1 each by Intrauterine route once, Disp: , Rfl:   •  mometasone (ELOCON) 0 1 % cream, Apply topically 2 (two) times a day (Patient taking differently: Apply topically if needed), Disp: 45 g, Rfl: 2  •  Semaglutide-Weight Management (WEGOVY) 0 25 MG/0 5ML, Inject 0 5 mL (0 25 mg total) under the skin once a week, Disp: 2 mL, Rfl: 1  •  venlafaxine (EFFEXOR-XR) 150 mg 24 hr capsule, TAKE ONE CAPSULE BY MOUTH DAILY WITH BREAKFAST, Disp: 90 capsule, Rfl: 0    Review of Systems          Objective:    Vitals:    03/10/23 1015   BP: 128/82   BP Location: Left arm   Patient Position: Sitting   Cuff Size: Large   Resp: 16   Weight: 112 kg (248 lb)   Height: 5' 3" (1 6 m)        Physical Exam  Constitutional:       Appearance: Normal appearance  HENT:      Head: Normocephalic and atraumatic  Neurological:      General: No focal deficit present  Mental Status: She is alert and oriented to person, place, and time  Psychiatric:         Mood and Affect: Mood normal          Behavior: Behavior normal          Thought Content:  Thought content normal          Judgment: Judgment normal

## 2023-03-24 DIAGNOSIS — R11.0 NAUSEA: Primary | ICD-10-CM

## 2023-03-24 RX ORDER — ONDANSETRON 4 MG/1
4 TABLET, FILM COATED ORAL EVERY 8 HOURS PRN
Qty: 20 TABLET | Refills: 0 | Status: SHIPPED | OUTPATIENT
Start: 2023-03-24 | End: 2023-04-18 | Stop reason: SDUPTHER

## 2023-03-30 DIAGNOSIS — M17.11 PRIMARY OSTEOARTHRITIS OF RIGHT KNEE: Primary | ICD-10-CM

## 2023-05-08 DIAGNOSIS — R11.0 NAUSEA: ICD-10-CM

## 2023-05-08 DIAGNOSIS — E66.01 CLASS 3 SEVERE OBESITY DUE TO EXCESS CALORIES WITHOUT SERIOUS COMORBIDITY WITH BODY MASS INDEX (BMI) OF 40.0 TO 44.9 IN ADULT (HCC): Primary | ICD-10-CM

## 2023-05-08 RX ORDER — ONDANSETRON 4 MG/1
4 TABLET, FILM COATED ORAL EVERY 8 HOURS PRN
Qty: 20 TABLET | Refills: 0 | Status: SHIPPED | OUTPATIENT
Start: 2023-05-08 | End: 2023-06-04 | Stop reason: SDUPTHER

## 2023-06-07 ENCOUNTER — TELEPHONE (OUTPATIENT)
Dept: FAMILY MEDICINE CLINIC | Facility: CLINIC | Age: 38
End: 2023-06-07

## 2023-06-20 DIAGNOSIS — R11.0 NAUSEA: ICD-10-CM

## 2023-06-20 DIAGNOSIS — R69 TAKING DRUG FOR CHRONIC DISEASE: ICD-10-CM

## 2023-06-20 DIAGNOSIS — Z13.1 SCREENING FOR DIABETES MELLITUS: ICD-10-CM

## 2023-06-20 DIAGNOSIS — Z13.220 SCREENING, LIPID: Primary | ICD-10-CM

## 2023-06-20 DIAGNOSIS — E66.01 MORBID OBESITY WITH BMI OF 40.0-44.9, ADULT (HCC): ICD-10-CM

## 2023-06-20 RX ORDER — ONDANSETRON 4 MG/1
4 TABLET, FILM COATED ORAL EVERY 8 HOURS PRN
Qty: 20 TABLET | Refills: 0 | Status: SHIPPED | OUTPATIENT
Start: 2023-06-20 | End: 2023-07-14 | Stop reason: SDUPTHER

## 2023-06-21 ENCOUNTER — APPOINTMENT (OUTPATIENT)
Dept: LAB | Facility: CLINIC | Age: 38
End: 2023-06-21
Payer: COMMERCIAL

## 2023-06-21 DIAGNOSIS — R69 TAKING DRUG FOR CHRONIC DISEASE: ICD-10-CM

## 2023-06-21 DIAGNOSIS — R11.0 NAUSEA: ICD-10-CM

## 2023-06-21 DIAGNOSIS — Z13.220 SCREENING, LIPID: ICD-10-CM

## 2023-06-21 DIAGNOSIS — Z13.1 SCREENING FOR DIABETES MELLITUS: ICD-10-CM

## 2023-06-21 LAB
ALBUMIN SERPL BCP-MCNC: 3.7 G/DL (ref 3.5–5)
ALP SERPL-CCNC: 64 U/L (ref 46–116)
ALT SERPL W P-5'-P-CCNC: 32 U/L (ref 12–78)
AMYLASE SERPL-CCNC: 29 IU/L (ref 25–115)
ANION GAP SERPL CALCULATED.3IONS-SCNC: 7 MMOL/L
AST SERPL W P-5'-P-CCNC: 20 U/L (ref 5–45)
BASOPHILS # BLD AUTO: 0.04 THOUSANDS/ÂΜL (ref 0–0.1)
BASOPHILS NFR BLD AUTO: 0 % (ref 0–1)
BILIRUB SERPL-MCNC: 0.53 MG/DL (ref 0.2–1)
BUN SERPL-MCNC: 11 MG/DL (ref 5–25)
CALCIUM SERPL-MCNC: 9.8 MG/DL (ref 8.3–10.1)
CHLORIDE SERPL-SCNC: 104 MMOL/L (ref 96–108)
CHOLEST SERPL-MCNC: 220 MG/DL
CO2 SERPL-SCNC: 25 MMOL/L (ref 21–32)
CREAT SERPL-MCNC: 1.09 MG/DL (ref 0.6–1.3)
EOSINOPHIL # BLD AUTO: 1.63 THOUSAND/ÂΜL (ref 0–0.61)
EOSINOPHIL NFR BLD AUTO: 11 % (ref 0–6)
ERYTHROCYTE [DISTWIDTH] IN BLOOD BY AUTOMATED COUNT: 13.9 % (ref 11.6–15.1)
EST. AVERAGE GLUCOSE BLD GHB EST-MCNC: 94 MG/DL
GFR SERPL CREATININE-BSD FRML MDRD: 63 ML/MIN/1.73SQ M
GLUCOSE P FAST SERPL-MCNC: 86 MG/DL (ref 65–99)
HBA1C MFR BLD: 4.9 %
HCT VFR BLD AUTO: 49.7 % (ref 34.8–46.1)
HDLC SERPL-MCNC: 48 MG/DL
HGB BLD-MCNC: 16.4 G/DL (ref 11.5–15.4)
IMM GRANULOCYTES # BLD AUTO: 0.11 THOUSAND/UL (ref 0–0.2)
IMM GRANULOCYTES NFR BLD AUTO: 1 % (ref 0–2)
LDLC SERPL CALC-MCNC: 154 MG/DL (ref 0–100)
LIPASE SERPL-CCNC: 126 U/L (ref 73–393)
LYMPHOCYTES # BLD AUTO: 2.77 THOUSANDS/ÂΜL (ref 0.6–4.47)
LYMPHOCYTES NFR BLD AUTO: 19 % (ref 14–44)
MCH RBC QN AUTO: 29.5 PG (ref 26.8–34.3)
MCHC RBC AUTO-ENTMCNC: 33 G/DL (ref 31.4–37.4)
MCV RBC AUTO: 90 FL (ref 82–98)
MONOCYTES # BLD AUTO: 0.83 THOUSAND/ÂΜL (ref 0.17–1.22)
MONOCYTES NFR BLD AUTO: 6 % (ref 4–12)
NEUTROPHILS # BLD AUTO: 9.13 THOUSANDS/ÂΜL (ref 1.85–7.62)
NEUTS SEG NFR BLD AUTO: 63 % (ref 43–75)
NRBC BLD AUTO-RTO: 0 /100 WBCS
PLATELET # BLD AUTO: 470 THOUSANDS/UL (ref 149–390)
PMV BLD AUTO: 9.9 FL (ref 8.9–12.7)
POTASSIUM SERPL-SCNC: 3.2 MMOL/L (ref 3.5–5.3)
PROT SERPL-MCNC: 8 G/DL (ref 6.4–8.4)
RBC # BLD AUTO: 5.55 MILLION/UL (ref 3.81–5.12)
SODIUM SERPL-SCNC: 136 MMOL/L (ref 135–147)
TRIGL SERPL-MCNC: 88 MG/DL
TSH SERPL DL<=0.05 MIU/L-ACNC: 1.56 UIU/ML (ref 0.45–4.5)
WBC # BLD AUTO: 14.51 THOUSAND/UL (ref 4.31–10.16)

## 2023-06-21 PROCEDURE — 82150 ASSAY OF AMYLASE: CPT

## 2023-06-21 PROCEDURE — 80061 LIPID PANEL: CPT

## 2023-06-21 PROCEDURE — 83690 ASSAY OF LIPASE: CPT

## 2023-06-21 PROCEDURE — 84443 ASSAY THYROID STIM HORMONE: CPT

## 2023-06-21 PROCEDURE — 36415 COLL VENOUS BLD VENIPUNCTURE: CPT

## 2023-06-21 PROCEDURE — 85025 COMPLETE CBC W/AUTO DIFF WBC: CPT

## 2023-06-21 PROCEDURE — 80053 COMPREHEN METABOLIC PANEL: CPT

## 2023-06-21 PROCEDURE — 83036 HEMOGLOBIN GLYCOSYLATED A1C: CPT

## 2023-06-23 ENCOUNTER — TELEPHONE (OUTPATIENT)
Dept: FAMILY MEDICINE CLINIC | Facility: CLINIC | Age: 38
End: 2023-06-23

## 2023-06-23 NOTE — TELEPHONE ENCOUNTER
Pt is scheduled for Monday at [facility]     ----- Message from Paloma Cassidy PA-C sent at 6/23/2023 12:05 PM EDT -----    Do you think we could bring [employer] in under my TCM spot on Monday to discuss her labs?    ----- Message -----  From: Lab, Background User  Sent: 6/21/2023   3:10 PM EDT  To: Paloma Cassidy PA-C

## 2023-06-26 ENCOUNTER — OFFICE VISIT (OUTPATIENT)
Dept: FAMILY MEDICINE CLINIC | Facility: CLINIC | Age: 38
End: 2023-06-26
Payer: COMMERCIAL

## 2023-06-26 VITALS
WEIGHT: 216.2 LBS | HEIGHT: 63 IN | BODY MASS INDEX: 38.31 KG/M2 | DIASTOLIC BLOOD PRESSURE: 80 MMHG | OXYGEN SATURATION: 96 % | SYSTOLIC BLOOD PRESSURE: 128 MMHG | TEMPERATURE: 99.3 F | RESPIRATION RATE: 16 BRPM | HEART RATE: 90 BPM

## 2023-06-26 DIAGNOSIS — R79.89 ABNORMAL CBC: ICD-10-CM

## 2023-06-26 DIAGNOSIS — E78.2 MIXED HYPERLIPIDEMIA: ICD-10-CM

## 2023-06-26 DIAGNOSIS — E87.6 HYPOKALEMIA: ICD-10-CM

## 2023-06-26 DIAGNOSIS — E66.01 MORBID OBESITY WITH BMI OF 40.0-44.9, ADULT (HCC): Primary | ICD-10-CM

## 2023-06-26 NOTE — PROGRESS NOTES
Assessment/Plan:    1  Obesity - success with wegovy, will stick to 1 mg dose  Patient cannot tolerate increase dose, discussed other options patient prefers to continue this    2  Abnormal CBC - most likely to dehydration, has since increased water, feeling better, will repeat this in two weeks    3  Hypokalemia - most likely due to vomiting, has stopped, hydrating and eating a banana daily, will recheck in 2 weeks    4  Hyperlipidemia - , continue with diet and repeat 6 months    F/u as needed  F/u 3 months        Subjective:   Chief Complaint   Patient presents with   • Review labs      Patient ID: Adelia Bonilla is a 34 y o  female  Patient here for follow up on wegovy, when patient went up to 1 2 mg dose was very nauseated, began vomiting, not moving her bowels  Trouble doing ADL  Tried medications to start moving her bowels without success, was having abdominal pain   is a physician, helped trouble shoot with patient and she did get a bowel movement and started to feel better  Does not think she can tolerate the 1 2 mg dose  Prefers to go back to 1 mg  Has resumed this already and is feeling better  Down 30 lbs, more active gym 3-4 times a week, eating earlier in day, smaller portions  Since seeing labs is back to hydrating and has eaten a banana daily        The following portions of the patient's history were reviewed and updated as appropriate: allergies, current medications, past family history, past medical history, past social history, past surgical history and problem list     Past Medical History:   Diagnosis Date   • Anxiety    • Blood type, Rh negative     Resolved 2/18/2016    • Migraine      Past Surgical History:   Procedure Laterality Date   • EAR SURGERY      Last impression 8/5/2015    • MYRINGOTOMY     • TONSILLECTOMY     • WISDOM TOOTH EXTRACTION       Family History   Problem Relation Age of Onset   • Goiter Mother    • Breast cancer Mother    • Polycythemia Father • Colon cancer Maternal Grandmother    • Diabetes Maternal Grandfather    • Uterine cancer Paternal Grandmother    • Diabetes Paternal Grandfather    • Hyperthyroidism Maternal Aunt    • Alcohol abuse Neg Hx    • Substance Abuse Neg Hx    • Mental illness Neg Hx      Social History     Socioeconomic History   • Marital status: /Civil Union     Spouse name: Not on file   • Number of children: Not on file   • Years of education: Not on file   • Highest education level: Not on file   Occupational History   • Not on file   Tobacco Use   • Smoking status: Never   • Smokeless tobacco: Never   Vaping Use   • Vaping Use: Never used   Substance and Sexual Activity   • Alcohol use: Yes     Comment: Socially   • Drug use: No   • Sexual activity: Yes     Partners: Male     Birth control/protection: I U D     Other Topics Concern   • Not on file   Social History Narrative    Alcohol use - as per [employer]    Always uses seat belt    Daily caffeine consumption, 1 serving a day - as per [employer]     Has smoke detectors        Occasional caffeine consumption - as per [employer]      Social Determinants of Health     Financial Resource Strain: Not on file   Food Insecurity: Not on file   Transportation Needs: Not on file   Physical Activity: Not on file   Stress: Not on file   Social Connections: Not on file   Intimate Partner Violence: Not on file   Housing Stability: Not on file       Current Outpatient Medications:   •  ALPRAZolam (XANAX) 0 25 mg tablet, Take 1 tablet (0 25 mg total) by mouth 4 (four) times a day as needed for anxiety, Disp: 30 tablet, Rfl: 0  •  diclofenac (VOLTAREN) 75 mg EC tablet, Take 1 tablet (75 mg total) by mouth 2 (two) times a day, Disp: 60 tablet, Rfl: 0  •  eletriptan (RELPAX) 40 MG tablet, Take 1 tablet (40 mg total) by mouth once as needed for migraine for up to 1 dose may repeat in 2 hours if necessary, Disp: 18 tablet, Rfl: 0  •  hydrocortisone (ANUSOL-HC) 2 5 % rectal cream, "Apply topically 2 (two) times a day For 3 days after having a Sitz bath (Patient taking differently: Apply topically if needed For 3 days after having a Sitz bath), Disp: 28 g, Rfl: 1  •  levonorgestrel (MIRENA) 20 MCG/24HR IUD, 1 each by Intrauterine route once, Disp: , Rfl:   •  mometasone (ELOCON) 0 1 % cream, Apply topically 2 (two) times a day (Patient taking differently: Apply topically if needed), Disp: 45 g, Rfl: 2  •  ondansetron (ZOFRAN) 4 mg tablet, Take 1 tablet (4 mg total) by mouth every 8 (eight) hours as needed for nausea or vomiting, Disp: 20 tablet, Rfl: 0  •  Semaglutide-Weight Management (WEGOVY) 1 MG/0 5ML, Inject 0 5 mL (1 mg total) under the skin once a week, Disp: 2 mL, Rfl: 0  •  venlafaxine (EFFEXOR-XR) 150 mg 24 hr capsule, Take 1 capsule (150 mg total) by mouth daily with breakfast, Disp: 90 capsule, Rfl: 1    Review of Systems          Objective:    Vitals:    06/26/23 1217   BP: 128/80   Pulse: 90   Resp: 16   Temp: 99 3 °F (37 4 °C)   SpO2: 96%   Weight: 98 1 kg (216 lb 3 2 oz)   Height: 5' 3\" (1 6 m)        Physical Exam  Constitutional:       Appearance: Normal appearance  She is well-developed and normal weight  HENT:      Head: Normocephalic and atraumatic  Neck:      Thyroid: No thyroid mass, thyromegaly or thyroid tenderness  Cardiovascular:      Rate and Rhythm: Normal rate and regular rhythm  Pulses: Normal pulses  Heart sounds: Normal heart sounds  Pulmonary:      Effort: Pulmonary effort is normal       Breath sounds: Normal breath sounds  Abdominal:      General: Abdomen is flat  Bowel sounds are normal  There is no distension  Palpations: Abdomen is soft  There is no mass  Tenderness: There is no abdominal tenderness  There is no guarding or rebound  Musculoskeletal:      Cervical back: Normal range of motion and neck supple  Skin:     General: Skin is warm  Neurological:      General: No focal deficit present        Mental Status: She is " alert and oriented to person, place, and time  Psychiatric:         Mood and Affect: Mood normal          Behavior: Behavior normal          Thought Content:  Thought content normal          Judgment: Judgment normal

## 2023-06-28 PROBLEM — E78.2 MIXED HYPERLIPIDEMIA: Status: ACTIVE | Noted: 2023-06-28

## 2023-07-14 ENCOUNTER — PATIENT MESSAGE (OUTPATIENT)
Dept: OBGYN CLINIC | Facility: CLINIC | Age: 38
End: 2023-07-14

## 2023-07-14 DIAGNOSIS — E66.01 MORBID OBESITY WITH BMI OF 40.0-44.9, ADULT (HCC): ICD-10-CM

## 2023-07-14 DIAGNOSIS — R11.0 NAUSEA: ICD-10-CM

## 2023-07-14 DIAGNOSIS — M17.11 PRIMARY OSTEOARTHRITIS OF RIGHT KNEE: ICD-10-CM

## 2023-07-14 RX ORDER — ONDANSETRON 4 MG/1
4 TABLET, FILM COATED ORAL EVERY 8 HOURS PRN
Qty: 20 TABLET | Refills: 0 | Status: SHIPPED | OUTPATIENT
Start: 2023-07-14 | End: 2023-09-06 | Stop reason: SDUPTHER

## 2023-07-17 RX ORDER — DICLOFENAC SODIUM 75 MG/1
75 TABLET, DELAYED RELEASE ORAL 2 TIMES DAILY
Qty: 60 TABLET | Refills: 0 | Status: SHIPPED | OUTPATIENT
Start: 2023-07-17

## 2023-08-18 ENCOUNTER — TELEPHONE (OUTPATIENT)
Dept: FAMILY MEDICINE CLINIC | Facility: CLINIC | Age: 38
End: 2023-08-18

## 2023-08-18 DIAGNOSIS — E66.01 MORBID OBESITY WITH BMI OF 40.0-44.9, ADULT (HCC): Primary | ICD-10-CM

## 2023-08-18 NOTE — TELEPHONE ENCOUNTER
----- Message from Leo Dance sent at 8/18/2023  9:10 AM EDT -----  Regarding: KJEBOF refill  Contact: [phone]  Sara Saldivar,    Could a refill of Wegovy be sent to Pending sale to [facility]? I didn't see it listed on my medication list to request a refill that way.  Thank you

## 2023-08-21 ENCOUNTER — TELEPHONE (OUTPATIENT)
Dept: FAMILY MEDICINE CLINIC | Facility: CLINIC | Age: 38
End: 2023-08-21

## 2023-08-21 NOTE — TELEPHONE ENCOUNTER
Pt called stating she needs another prior auth for her wegovy. The auth only lasts for 6 months per insurance.

## 2023-09-01 ENCOUNTER — OFFICE VISIT (OUTPATIENT)
Dept: OBGYN CLINIC | Facility: CLINIC | Age: 38
End: 2023-09-01
Payer: COMMERCIAL

## 2023-09-01 VITALS
SYSTOLIC BLOOD PRESSURE: 148 MMHG | BODY MASS INDEX: 38.27 KG/M2 | WEIGHT: 216 LBS | HEART RATE: 88 BPM | HEIGHT: 63 IN | DIASTOLIC BLOOD PRESSURE: 99 MMHG

## 2023-09-01 DIAGNOSIS — M17.12 PRIMARY OSTEOARTHRITIS OF LEFT KNEE: ICD-10-CM

## 2023-09-01 DIAGNOSIS — M17.11 PRIMARY OSTEOARTHRITIS OF RIGHT KNEE: Primary | ICD-10-CM

## 2023-09-01 DIAGNOSIS — S83.249D ACUTE MEDIAL MENISCAL TEAR, UNSPECIFIED LATERALITY, SUBSEQUENT ENCOUNTER: ICD-10-CM

## 2023-09-01 PROCEDURE — 20610 DRAIN/INJ JOINT/BURSA W/O US: CPT | Performed by: STUDENT IN AN ORGANIZED HEALTH CARE EDUCATION/TRAINING PROGRAM

## 2023-09-01 PROCEDURE — 99213 OFFICE O/P EST LOW 20 MIN: CPT | Performed by: STUDENT IN AN ORGANIZED HEALTH CARE EDUCATION/TRAINING PROGRAM

## 2023-09-01 RX ADMIN — TRIAMCINOLONE ACETONIDE 40 MG: 40 INJECTION, SUSPENSION INTRA-ARTICULAR; INTRAMUSCULAR at 10:45

## 2023-09-01 RX ADMIN — BUPIVACAINE HYDROCHLORIDE 4 ML: 2.5 INJECTION, SOLUTION INFILTRATION; PERINEURAL at 10:45

## 2023-09-01 NOTE — PROGRESS NOTES
Knee New Office Note    Assessment:     1. Primary osteoarthritis of right knee    2. Acute medial meniscal tear, unspecified laterality, subsequent encounter    3. Primary osteoarthritis of left knee        Plan:     Problem List Items Addressed This Visit    None  Visit Diagnoses     Primary osteoarthritis of right knee    -  Primary    Relevant Orders    Injection Procedure Prior Authorization    Acute medial meniscal tear, unspecified laterality, subsequent encounter        Primary osteoarthritis of left knee        Relevant Orders    Injection Procedure Prior Authorization          Findings today are consistent with severe b/l knee osteoarthritis localized to the medial compartments with right medial meniscus tear. Patient requests b/l knee CSI as well as completing prior authorization for repeat Visco injections as she had significant relief with the combination previously. Risks, benefits and alternative treatments were discussed with the patient. These included but are not limited to: bleeding, infection, damage to nerves, vessels or tendons, allergic reaction to agents, possible increase in pain, tendon or ligament rupture, weakening of bone or soft tissues, and/or elevation in blood sugar. Patient understands and would like to proceed with the proposed procedure. She tolerated injections well. NSAIDs and cold compress as needed for post injection discomfort. Patient will return to the office in mid October for Visco (6 months from previous visco). Subjective:     Patient ID: Estefania Austin is a 34 y.o. female. Chief Complaint:  HPI:  The patient presents with a chief complaint of bilateral knee pain. Patient was last seen in office 3/7/23 at which time she received CSI to right knee. On 4/14/23 patient had Visco injection done. Patient would like CSI in both knees today and to have repeat Visco when she is able in October. Pain score 7/10 B/L.      Allergy:  Allergies   Allergen Reactions   • Bactrim [Sulfamethoxazole-Trimethoprim] Anaphylaxis and Edema     [facility] - [id number]: painful to swallow     Medications:  all current active meds have been reviewed  Past Medical History:  Past Medical History:   Diagnosis Date   • Anxiety    • Blood type, Rh negative     Resolved 2/18/2016    • Migraine      Past Surgical History:  Past Surgical History:   Procedure Laterality Date   • EAR SURGERY      Last impression 8/5/2015    • MYRINGOTOMY     • TONSILLECTOMY     • WISDOM TOOTH EXTRACTION       Family History:  Family History   Problem Relation Age of Onset   • Goiter Mother    • Breast cancer Mother    • Polycythemia Father    • Colon cancer Maternal Grandmother    • Diabetes Maternal Grandfather    • Uterine cancer Paternal Grandmother    • Diabetes Paternal Grandfather    • Hyperthyroidism Maternal Aunt    • Alcohol abuse Neg Hx    • Substance Abuse Neg Hx    • Mental illness Neg Hx      Social History:  Social History     Substance and Sexual Activity   Alcohol Use Yes    Comment: Socially     Social History     Substance and Sexual Activity   Drug Use No     Social History     Tobacco Use   Smoking Status Never   Smokeless Tobacco Never           ROS:  General: Per HPI  Skin: Negative, except if noted below  HEENT: Negative  Respiratory: Negative  Cardiovascular: Negative  Gastrointestinal: Negative  Urinary: Negative  Vascular: Negative  Musculoskeletal: Positive per HPI   Neurologic: Positive per HPI  Endocrine: Negative    Objective:  BP Readings from Last 1 Encounters:   09/01/23 148/99      Wt Readings from Last 1 Encounters:   09/01/23 98 kg (216 lb)        Respiratory:   non-labored respirations    Lymphatics:  no palpable lymph nodes    Gait:   Antalgic    Neurologic:   Alert and oriented times 3  Patient with normal sensation except as noted below  Deep tendon reflexes 2+ except as noted in MSK exam    Bilateral Lower Extremity:  Left Knee:       Inspection:  Skin intact    Overall limb alignment valgus    Effusion: none    ROM 0-115    Extensor Lag: none    Palpation: medial Joint line tenderness to palpation    AP Stability at 90 deg stable    M/L stability in full extension stable    M/L stability in midflexion stable    Motor: 5/5 IP/Q/HS/TA/GS    Pulses: 2+ DP / 2+ PT    SILT DP/SP/S/S/TN    Right knee: Inspection:  Skin incact    Overall limb alignment valgus    Effusion: mild    ROM 5-115 with pain    Extensor Lag: none    Palpation: medial Joint line and patellar tendon tenderness to palpation    AP Stability at 90 deg stable    M/L stability in full extension stable    M/L stability in midflexion stable    Motor: 5/5 IP/Q/HS/TA/GS    Pulses: 2+ DP / 2+ PT    SILT DP/SP/S/S/TN    Imaging:  My interpretation XR AP scanogram/AP bilateral knee/lateral/winters/sunrise right knee: severe medial joint space narrowing, subchondral sclerosis, subchondral cysts, osteophyte formation. No fracture or dislocation. MRI right knee: severe medial compartment OA, macerated medial meniscus tear with extrusion. BMI:   Estimated body mass index is 38.26 kg/m² as calculated from the following:    Height as of this encounter: 5' 3" (1.6 m). Weight as of this encounter: 98 kg (216 lb). BSA:   Estimated body surface area is 2 meters squared as calculated from the following:    Height as of this encounter: 5' 3" (1.6 m). Weight as of this encounter: 98 kg (216 lb). Large joint arthrocentesis: L knee  Universal Protocol:  Consent: Verbal consent obtained. Risks and benefits: risks, benefits and alternatives were discussed  Consent given by: patient  Time out: Immediately prior to procedure a "time out" was called to verify the correct patient, procedure, equipment, support staff and site/side marked as required.   Timeout called at: 9/1/2023 11:10 AM.  Patient understanding: patient states understanding of the procedure being performed  Patient consent: the patient's understanding of the procedure matches consent given  Site marked: the operative site was marked  Patient identity confirmed: verbally with patient    Supporting Documentation  Indications: pain   Procedure Details  Location: knee - L knee  Needle size: 22 G  Ultrasound guidance: no  Medications administered: 4 mL bupivacaine 0.25 %; 40 mg triamcinolone acetonide 40 mg/mL    Patient tolerance: patient tolerated the procedure well with no immediate complications  Dressing:  Sterile dressing applied    Large joint arthrocentesis: R knee  Universal Protocol:  Consent: Verbal consent obtained. Risks and benefits: risks, benefits and alternatives were discussed  Consent given by: patient  Time out: Immediately prior to procedure a "time out" was called to verify the correct patient, procedure, equipment, support staff and site/side marked as required.   Timeout called at: 9/1/2023 11:11 AM.  Patient understanding: patient states understanding of the procedure being performed  Patient consent: the patient's understanding of the procedure matches consent given  Site marked: the operative site was marked  Patient identity confirmed: verbally with patient    Supporting Documentation  Indications: pain   Procedure Details  Location: knee - R knee  Needle size: 22 G  Ultrasound guidance: no  Medications administered: 4 mL bupivacaine 0.25 %; 40 mg triamcinolone acetonide 40 mg/mL    Aspirate amount: 18 mL  Aspirate: clear and yellow    Patient tolerance: patient tolerated the procedure well with no immediate complications  Dressing:  Sterile dressing applied        Scribe Attestation    I,:  Colleen Celis am acting as a scribe while in the presence of the attending physician.:       I,:  David Sotelo DO personally performed the services described in this documentation    as scribed in my presence.:

## 2023-09-02 RX ORDER — TRIAMCINOLONE ACETONIDE 40 MG/ML
40 INJECTION, SUSPENSION INTRA-ARTICULAR; INTRAMUSCULAR
Status: COMPLETED | OUTPATIENT
Start: 2023-09-01 | End: 2023-09-01

## 2023-09-02 RX ORDER — BUPIVACAINE HYDROCHLORIDE 2.5 MG/ML
4 INJECTION, SOLUTION INFILTRATION; PERINEURAL
Status: COMPLETED | OUTPATIENT
Start: 2023-09-01 | End: 2023-09-01

## 2023-09-06 DIAGNOSIS — R11.0 NAUSEA: ICD-10-CM

## 2023-09-06 DIAGNOSIS — E66.01 MORBID OBESITY WITH BMI OF 40.0-44.9, ADULT (HCC): ICD-10-CM

## 2023-09-06 DIAGNOSIS — G43.909 MIGRAINE WITHOUT STATUS MIGRAINOSUS, NOT INTRACTABLE, UNSPECIFIED MIGRAINE TYPE: ICD-10-CM

## 2023-09-06 RX ORDER — ONDANSETRON 4 MG/1
4 TABLET, FILM COATED ORAL EVERY 8 HOURS PRN
Qty: 20 TABLET | Refills: 0 | Status: SHIPPED | OUTPATIENT
Start: 2023-09-06

## 2023-09-06 RX ORDER — VENLAFAXINE HYDROCHLORIDE 150 MG/1
150 CAPSULE, EXTENDED RELEASE ORAL
Qty: 90 CAPSULE | Refills: 0 | Status: SHIPPED | OUTPATIENT
Start: 2023-09-06

## 2023-09-13 ENCOUNTER — APPOINTMENT (OUTPATIENT)
Dept: LAB | Facility: CLINIC | Age: 38
End: 2023-09-13
Payer: COMMERCIAL

## 2023-09-13 DIAGNOSIS — E87.6 HYPOKALEMIA: ICD-10-CM

## 2023-09-13 DIAGNOSIS — R79.89 ABNORMAL CBC: ICD-10-CM

## 2023-09-13 LAB
ALBUMIN SERPL BCP-MCNC: 4.3 G/DL (ref 3.5–5)
ALP SERPL-CCNC: 41 U/L (ref 34–104)
ALT SERPL W P-5'-P-CCNC: 16 U/L (ref 7–52)
ANION GAP SERPL CALCULATED.3IONS-SCNC: 6 MMOL/L
AST SERPL W P-5'-P-CCNC: 11 U/L (ref 13–39)
BASOPHILS # BLD AUTO: 0.04 THOUSANDS/ÂΜL (ref 0–0.1)
BASOPHILS NFR BLD AUTO: 1 % (ref 0–1)
BILIRUB SERPL-MCNC: 0.58 MG/DL (ref 0.2–1)
BUN SERPL-MCNC: 14 MG/DL (ref 5–25)
CALCIUM SERPL-MCNC: 10 MG/DL (ref 8.4–10.2)
CHLORIDE SERPL-SCNC: 103 MMOL/L (ref 96–108)
CO2 SERPL-SCNC: 30 MMOL/L (ref 21–32)
CREAT SERPL-MCNC: 0.93 MG/DL (ref 0.6–1.3)
EOSINOPHIL # BLD AUTO: 0.17 THOUSAND/ÂΜL (ref 0–0.61)
EOSINOPHIL NFR BLD AUTO: 2 % (ref 0–6)
ERYTHROCYTE [DISTWIDTH] IN BLOOD BY AUTOMATED COUNT: 13.6 % (ref 11.6–15.1)
GFR SERPL CREATININE-BSD FRML MDRD: 77 ML/MIN/1.73SQ M
GLUCOSE P FAST SERPL-MCNC: 79 MG/DL (ref 65–99)
HCT VFR BLD AUTO: 47.1 % (ref 34.8–46.1)
HGB BLD-MCNC: 14.7 G/DL (ref 11.5–15.4)
IMM GRANULOCYTES # BLD AUTO: 0.02 THOUSAND/UL (ref 0–0.2)
IMM GRANULOCYTES NFR BLD AUTO: 0 % (ref 0–2)
LYMPHOCYTES # BLD AUTO: 1.39 THOUSANDS/ÂΜL (ref 0.6–4.47)
LYMPHOCYTES NFR BLD AUTO: 17 % (ref 14–44)
MCH RBC QN AUTO: 29.5 PG (ref 26.8–34.3)
MCHC RBC AUTO-ENTMCNC: 31.2 G/DL (ref 31.4–37.4)
MCV RBC AUTO: 95 FL (ref 82–98)
MONOCYTES # BLD AUTO: 0.45 THOUSAND/ÂΜL (ref 0.17–1.22)
MONOCYTES NFR BLD AUTO: 6 % (ref 4–12)
NEUTROPHILS # BLD AUTO: 6.1 THOUSANDS/ÂΜL (ref 1.85–7.62)
NEUTS SEG NFR BLD AUTO: 74 % (ref 43–75)
NRBC BLD AUTO-RTO: 0 /100 WBCS
PLATELET # BLD AUTO: 395 THOUSANDS/UL (ref 149–390)
PMV BLD AUTO: 9.8 FL (ref 8.9–12.7)
POTASSIUM SERPL-SCNC: 4.8 MMOL/L (ref 3.5–5.3)
PROT SERPL-MCNC: 7.1 G/DL (ref 6.4–8.4)
RBC # BLD AUTO: 4.98 MILLION/UL (ref 3.81–5.12)
SODIUM SERPL-SCNC: 139 MMOL/L (ref 135–147)
WBC # BLD AUTO: 8.17 THOUSAND/UL (ref 4.31–10.16)

## 2023-09-13 PROCEDURE — 85025 COMPLETE CBC W/AUTO DIFF WBC: CPT

## 2023-09-13 PROCEDURE — 36415 COLL VENOUS BLD VENIPUNCTURE: CPT

## 2023-09-13 PROCEDURE — 80053 COMPREHEN METABOLIC PANEL: CPT

## 2023-09-27 ENCOUNTER — TELEPHONE (OUTPATIENT)
Dept: FAMILY MEDICINE CLINIC | Facility: CLINIC | Age: 38
End: 2023-09-27

## 2023-09-27 NOTE — TELEPHONE ENCOUNTER
Comments: Patient tolerated well, declines use of aloe Vera or sunscreen. LMOM to let patient know they can call back to schedule FLU shot. Fluence (Will Not Render If 0): 20 Post-Care Instructions: I reviewed with the patient in detail post-care instructions. Patient should avoid sun for a minimum of 4 weeks before and after treatment. Detail Level: Zone Price (Use Numbers Only, No Special Characters Or $): 59.13 Number Of Prepaid Treatments (Will Not Render If 0): 0 Laser Type: Alexandrite 755nm Spot Size: 15 mm Pulse Duration: 20 ms Render Post-Care In The Note: No Post-Procedure Care: Immediate endpoint: perifollicular erythema and edema. Vaseline and ice applied. Post care reviewed with patient. Cooling Override: lakisha 5 Pre-Procedure: Prior to proceeding the treatment areas were cleaned and all present put on their eye protection. Tolerated Procedure (Optional): Tolerated Well External Cooling: Nataliya Cryo 5 External Cooling Fan Speed: 5 Total Pulses: 75 Shaving (Optional): The patient shaved at home Consent: Written consent obtained, risks reviewed including but not limited to crusting, scabbing, blistering, scarring, darker or lighter pigmentary change, paradoxical hair regrowth, incomplete removal of hair and infection.

## 2023-10-01 ENCOUNTER — PATIENT MESSAGE (OUTPATIENT)
Dept: OBGYN CLINIC | Facility: CLINIC | Age: 38
End: 2023-10-01

## 2023-10-01 DIAGNOSIS — M17.11 PRIMARY OSTEOARTHRITIS OF RIGHT KNEE: Primary | ICD-10-CM

## 2023-10-01 DIAGNOSIS — M17.12 PRIMARY OSTEOARTHRITIS OF LEFT KNEE: ICD-10-CM

## 2023-10-02 DIAGNOSIS — M17.11 PRIMARY OSTEOARTHRITIS OF RIGHT KNEE: ICD-10-CM

## 2023-10-02 RX ORDER — DICLOFENAC SODIUM 75 MG/1
75 TABLET, DELAYED RELEASE ORAL 2 TIMES DAILY
Qty: 60 TABLET | Refills: 0 | Status: SHIPPED | OUTPATIENT
Start: 2023-10-02

## 2023-10-10 DIAGNOSIS — E66.01 MORBID OBESITY WITH BMI OF 40.0-44.9, ADULT (HCC): ICD-10-CM

## 2023-10-20 ENCOUNTER — PROCEDURE VISIT (OUTPATIENT)
Dept: OBGYN CLINIC | Facility: CLINIC | Age: 38
End: 2023-10-20

## 2023-10-20 VITALS
WEIGHT: 216 LBS | BODY MASS INDEX: 38.27 KG/M2 | HEART RATE: 79 BPM | SYSTOLIC BLOOD PRESSURE: 150 MMHG | DIASTOLIC BLOOD PRESSURE: 90 MMHG | HEIGHT: 63 IN

## 2023-10-20 DIAGNOSIS — M17.12 PRIMARY OSTEOARTHRITIS OF LEFT KNEE: Primary | ICD-10-CM

## 2023-10-20 DIAGNOSIS — M17.11 PRIMARY OSTEOARTHRITIS OF RIGHT KNEE: ICD-10-CM

## 2023-10-20 NOTE — PROGRESS NOTES
Santa bilateral knees  Pain 6/10    Large joint arthrocentesis: bilateral knee  Universal Protocol:  Consent: Verbal consent obtained.   Risks and benefits: risks, benefits and alternatives were discussed  Consent given by: patient  Patient understanding: patient states understanding of the procedure being performed  Patient identity confirmed: verbally with patient  Supporting Documentation  Indications: pain   Procedure Details  Location: knee - bilateral knee  Preparation: Patient was prepped and draped in the usual sterile fashion  Needle size: 20 G  Approach: anterolateral    Medications (Right): 3 mL sodium hyaluronate 60 MG/3MLMedications (Left): 3 mL sodium hyaluronate 60 MG/3ML   Patient tolerance: patient tolerated the procedure well with no immediate complications  Dressing:  Sterile dressing applied            Sandie Raya PA-C

## 2023-10-31 ENCOUNTER — OFFICE VISIT (OUTPATIENT)
Dept: FAMILY MEDICINE CLINIC | Facility: CLINIC | Age: 38
End: 2023-10-31
Payer: COMMERCIAL

## 2023-10-31 VITALS
DIASTOLIC BLOOD PRESSURE: 78 MMHG | BODY MASS INDEX: 36.32 KG/M2 | OXYGEN SATURATION: 100 % | HEIGHT: 63 IN | SYSTOLIC BLOOD PRESSURE: 120 MMHG | RESPIRATION RATE: 16 BRPM | WEIGHT: 205 LBS | HEART RATE: 82 BPM | TEMPERATURE: 97.8 F

## 2023-10-31 DIAGNOSIS — H92.01 POSTERIOR AURICULAR PAIN, RIGHT: Primary | ICD-10-CM

## 2023-10-31 PROCEDURE — 99213 OFFICE O/P EST LOW 20 MIN: CPT | Performed by: PHYSICIAN ASSISTANT

## 2023-10-31 RX ORDER — CEPHALEXIN 500 MG/1
500 CAPSULE ORAL 3 TIMES DAILY
Qty: 21 CAPSULE | Refills: 0 | Status: SHIPPED | OUTPATIENT
Start: 2023-10-31 | End: 2023-11-07

## 2023-10-31 NOTE — PROGRESS NOTES
Assessment/Plan:    Post auricular lymph node - warm compresses, ibuprofen as needed, keflex tid x 7 days, if no improvement will refer to ENT    F/u as needed    Subjective:   Chief Complaint   Patient presents with    Mass     Behind right ear  Painful when lying on right side  First noticed 2 weeks ago      Patient ID: Stevenson Torres is a 34 y.o. female. 2 weeks noticed a lump behind ear, becoming more sensitive, not changing size. Tried no OTC. Earache   There is pain in the right ear. This is a new problem. The current episode started 1 to 4 weeks ago. The problem occurs hourly. The problem has been gradually worsening. There has been no fever. The pain is at a severity of 5/10. Associated symptoms include abdominal pain. Pertinent negatives include no coughing, diarrhea, ear discharge, headaches, hearing loss, neck pain, rash, rhinorrhea, sore throat or vomiting.        The following portions of the patient's history were reviewed and updated as appropriate: allergies, current medications, past family history, past medical history, past social history, past surgical history, and problem list.    Past Medical History:   Diagnosis Date    Anxiety     Blood type, Rh negative     Resolved 2/18/2016     Migraine      Past Surgical History:   Procedure Laterality Date    EAR SURGERY      Last impression 8/5/2015     MYRINGOTOMY      TONSILLECTOMY      WISDOM TOOTH EXTRACTION       Family History   Problem Relation Age of Onset    Goiter Mother     Breast cancer Mother     Polycythemia Father     Colon cancer Maternal Grandmother     Diabetes Maternal Grandfather     Uterine cancer Paternal Grandmother     Diabetes Paternal Grandfather     Hyperthyroidism Maternal Aunt     Alcohol abuse Neg Hx     Substance Abuse Neg Hx     Mental illness Neg Hx      Social History     Socioeconomic History    Marital status: /Civil Union     Spouse name: Not on file    Number of children: Not on file    Years of education: Not on file    Highest education level: Not on file   Occupational History    Not on file   Tobacco Use    Smoking status: Never    Smokeless tobacco: Never   Vaping Use    Vaping Use: Never used   Substance and Sexual Activity    Alcohol use: Yes     Comment: Socially    Drug use: No    Sexual activity: Yes     Partners: Male     Birth control/protection: I.U.D.    Other Topics Concern    Not on file   Social History Narrative    Alcohol use - as per [employer]    Always uses seat belt    Daily caffeine consumption, 1 serving a day - as per [employer]     Has smoke detectors        Occasional caffeine consumption - as per [employer]      Social Determinants of Health     Financial Resource Strain: Not on file   Food Insecurity: Not on file   Transportation Needs: Not on file   Physical Activity: Not on file   Stress: Not on file   Social Connections: Not on file   Intimate Partner Violence: Not on file   Housing Stability: Not on file       Current Outpatient Medications:     ALPRAZolam (XANAX) 0.25 mg tablet, Take 1 tablet (0.25 mg total) by mouth 4 (four) times a day as needed for anxiety, Disp: 30 tablet, Rfl: 0    diclofenac (VOLTAREN) 75 mg EC tablet, Take 1 tablet (75 mg total) by mouth 2 (two) times a day, Disp: 60 tablet, Rfl: 0    eletriptan (RELPAX) 40 MG tablet, Take 1 tablet (40 mg total) by mouth once as needed for migraine for up to 1 dose may repeat in 2 hours if necessary, Disp: 18 tablet, Rfl: 0    levonorgestrel (MIRENA) 20 MCG/24HR IUD, 1 each by Intrauterine route once, Disp: , Rfl:     ondansetron (ZOFRAN) 4 mg tablet, Take 1 tablet (4 mg total) by mouth every 8 (eight) hours as needed for nausea or vomiting, Disp: 20 tablet, Rfl: 0    Semaglutide-Weight Management (WEGOVY) 1.7 MG/0.75ML, Inject 0.75 mL (1.7 mg total) under the skin once a week, Disp: 3 mL, Rfl: 0    venlafaxine (EFFEXOR-XR) 150 mg 24 hr capsule, Take 1 capsule (150 mg total) by mouth daily with breakfast, Disp: 90 capsule, Rfl: 0    hydrocortisone (ANUSOL-HC) 2.5 % rectal cream, Apply topically 2 (two) times a day For 3 days after having a Sitz bath (Patient not taking: Reported on 9/1/2023), Disp: 28 g, Rfl: 1    mometasone (ELOCON) 0.1 % cream, Apply topically 2 (two) times a day (Patient not taking: Reported on 9/1/2023), Disp: 45 g, Rfl: 2    Review of Systems   HENT:  Positive for ear pain. Negative for ear discharge, hearing loss, rhinorrhea and sore throat. Respiratory:  Negative for cough. Gastrointestinal:  Positive for abdominal pain. Negative for diarrhea and vomiting. Musculoskeletal:  Negative for neck pain. Skin:  Negative for rash. Neurological:  Negative for headaches. Objective:    Vitals:    10/31/23 1031   BP: 120/78   Pulse: 82   Resp: 16   Temp: 97.8 °F (36.6 °C)   TempSrc: Temporal   SpO2: 100%   Weight: 93 kg (205 lb)   Height: 5' 3" (1.6 m)        Physical Exam  Constitutional:       Appearance: Normal appearance. HENT:      Head: Normocephalic and atraumatic. Right Ear: Tympanic membrane, ear canal and external ear normal.      Left Ear: Tympanic membrane, ear canal and external ear normal.   Musculoskeletal:         General: Normal range of motion. Cervical back: Normal range of motion and neck supple. Lymphadenopathy:      Head:      Right side of head: Posterior auricular adenopathy present. No submental, submandibular, tonsillar, preauricular or occipital adenopathy. Left side of head: No submental, submandibular, tonsillar, preauricular, posterior auricular or occipital adenopathy. Cervical: No cervical adenopathy. Skin:     General: Skin is warm. Neurological:      General: No focal deficit present. Mental Status: She is alert and oriented to person, place, and time. Psychiatric:         Mood and Affect: Mood normal.         Behavior: Behavior normal.         Thought Content:  Thought content normal.         Judgment: Judgment normal.

## 2023-11-07 DIAGNOSIS — E66.01 MORBID OBESITY WITH BMI OF 40.0-44.9, ADULT (HCC): ICD-10-CM

## 2023-11-07 DIAGNOSIS — R11.0 NAUSEA: ICD-10-CM

## 2023-11-08 RX ORDER — ONDANSETRON 4 MG/1
4 TABLET, FILM COATED ORAL EVERY 8 HOURS PRN
Qty: 20 TABLET | Refills: 0 | Status: SHIPPED | OUTPATIENT
Start: 2023-11-08

## 2023-11-14 DIAGNOSIS — H92.01 POSTERIOR AURICULAR PAIN, RIGHT: Primary | ICD-10-CM

## 2023-11-21 RX ORDER — DICLOFENAC SODIUM 75 MG/1
75 TABLET, DELAYED RELEASE ORAL 2 TIMES DAILY
Qty: 60 TABLET | Refills: 1 | Status: SHIPPED | OUTPATIENT
Start: 2023-11-21

## 2023-12-12 DIAGNOSIS — E66.01 MORBID OBESITY WITH BMI OF 40.0-44.9, ADULT (HCC): ICD-10-CM

## 2023-12-12 DIAGNOSIS — R11.0 NAUSEA: ICD-10-CM

## 2023-12-12 DIAGNOSIS — G43.909 MIGRAINE WITHOUT STATUS MIGRAINOSUS, NOT INTRACTABLE, UNSPECIFIED MIGRAINE TYPE: ICD-10-CM

## 2023-12-12 RX ORDER — VENLAFAXINE HYDROCHLORIDE 150 MG/1
150 CAPSULE, EXTENDED RELEASE ORAL
Qty: 90 CAPSULE | Refills: 0 | Status: SHIPPED | OUTPATIENT
Start: 2023-12-12

## 2023-12-12 RX ORDER — ONDANSETRON 4 MG/1
4 TABLET, FILM COATED ORAL EVERY 8 HOURS PRN
Qty: 20 TABLET | Refills: 0 | Status: SHIPPED | OUTPATIENT
Start: 2023-12-12

## 2024-01-12 ENCOUNTER — OFFICE VISIT (OUTPATIENT)
Dept: OBGYN CLINIC | Facility: CLINIC | Age: 39
End: 2024-01-12

## 2024-01-12 ENCOUNTER — TELEPHONE (OUTPATIENT)
Dept: OBGYN CLINIC | Facility: CLINIC | Age: 39
End: 2024-01-12

## 2024-01-12 ENCOUNTER — PREP FOR PROCEDURE (OUTPATIENT)
Dept: OBGYN CLINIC | Facility: CLINIC | Age: 39
End: 2024-01-12

## 2024-01-12 VITALS
BODY MASS INDEX: 36.5 KG/M2 | DIASTOLIC BLOOD PRESSURE: 80 MMHG | SYSTOLIC BLOOD PRESSURE: 130 MMHG | HEIGHT: 63 IN | HEART RATE: 79 BPM | WEIGHT: 206 LBS

## 2024-01-12 DIAGNOSIS — M17.12 PRIMARY OSTEOARTHRITIS OF LEFT KNEE: ICD-10-CM

## 2024-01-12 DIAGNOSIS — M17.11 PRIMARY OSTEOARTHRITIS OF RIGHT KNEE: Primary | ICD-10-CM

## 2024-01-12 RX ORDER — ASCORBIC ACID 500 MG
500 TABLET ORAL 2 TIMES DAILY
Qty: 60 TABLET | Refills: 1 | Status: SHIPPED | OUTPATIENT
Start: 2024-01-12

## 2024-01-12 RX ORDER — FOLIC ACID 1 MG/1
1 TABLET ORAL DAILY
Qty: 30 TABLET | Refills: 1 | Status: SHIPPED | OUTPATIENT
Start: 2024-01-12

## 2024-01-12 RX ORDER — MELATONIN
2000 DAILY
Qty: 60 TABLET | Refills: 1 | Status: SHIPPED | OUTPATIENT
Start: 2024-01-12

## 2024-01-12 RX ORDER — SODIUM CHLORIDE, SODIUM LACTATE, POTASSIUM CHLORIDE, CALCIUM CHLORIDE 600; 310; 30; 20 MG/100ML; MG/100ML; MG/100ML; MG/100ML
125 INJECTION, SOLUTION INTRAVENOUS CONTINUOUS
OUTPATIENT
Start: 2024-01-12

## 2024-01-12 RX ORDER — CHLORHEXIDINE GLUCONATE 4 G/100ML
SOLUTION TOPICAL DAILY PRN
OUTPATIENT
Start: 2024-01-12

## 2024-01-12 RX ORDER — MULTIVIT-MIN/IRON FUM/FOLIC AC 7.5 MG-4
1 TABLET ORAL DAILY
Qty: 30 TABLET | Refills: 1 | Status: SHIPPED | OUTPATIENT
Start: 2024-01-12

## 2024-01-12 RX ORDER — TRIAMCINOLONE ACETONIDE 40 MG/ML
40 INJECTION, SUSPENSION INTRA-ARTICULAR; INTRAMUSCULAR
Status: COMPLETED | OUTPATIENT
Start: 2024-01-12 | End: 2024-01-12

## 2024-01-12 RX ORDER — ACETAMINOPHEN 325 MG/1
975 TABLET ORAL ONCE
OUTPATIENT
Start: 2024-01-12 | End: 2024-01-12

## 2024-01-12 RX ORDER — LIDOCAINE HYDROCHLORIDE 10 MG/ML
4 INJECTION, SOLUTION INFILTRATION; PERINEURAL
Status: COMPLETED | OUTPATIENT
Start: 2024-01-12 | End: 2024-01-12

## 2024-01-12 RX ORDER — CHLORHEXIDINE GLUCONATE ORAL RINSE 1.2 MG/ML
15 SOLUTION DENTAL ONCE
OUTPATIENT
Start: 2024-01-12 | End: 2024-01-12

## 2024-01-12 RX ADMIN — TRIAMCINOLONE ACETONIDE 40 MG: 40 INJECTION, SUSPENSION INTRA-ARTICULAR; INTRAMUSCULAR at 09:00

## 2024-01-12 RX ADMIN — LIDOCAINE HYDROCHLORIDE 4 ML: 10 INJECTION, SOLUTION INFILTRATION; PERINEURAL at 09:00

## 2024-01-12 NOTE — PROGRESS NOTES
Knee New Office Note    Assessment:     1. Primary osteoarthritis of right knee    2. Primary osteoarthritis of left knee        Plan:     Problem List Items Addressed This Visit    None  Visit Diagnoses       Primary osteoarthritis of right knee    -  Primary    Primary osteoarthritis of left knee               Findings today are consistent with bilateral knee osteoarthritis. Imaging and prognosis was reviewed with the patient today. Discussed treatment options including continued observation, low impact exercises, bracing, anti-inflammatories, physical therapy, cortisone injection, visco injection, versus surgical intervention. Reviewed partial versus total knee arthroplasty. MRI of right knee ordered for surgical planning. Cortisone steroid injection was administered to the left knee today. Patient should avoid strenuous activities for the next 1-2 days. Patient should avoid vaccines for the next 2 weeks if possible. Can apply cold compress for soreness. If patient feels relief with the cortisone injections, procedure can be repeated every 3 months. Follow up after MRI of right knee.     The patient has elected to proceed with right medial UKA vs TKA. Risks and benefits of surgery to include but not limited to bleeding, infection, damage to surrounding structures, hardware failure, instability, fracture, dislocation, need for further surgery, continued pain, stiffness, blood clots, stroke, and heart attack was discussed with the patient. Informed consent was signed today in the office. The patient has met with our surgical schedulers and our preoperative joint replacement pathway has been initiated. All questions were answered. Patient will follow-up 2 weeks post operatively. BMI is appropriate at 36.49 and is a nonsmoker.      Subjective:     Patient ID: Kaitlynn Madera is a 38 y.o. female.  Chief Complaint:  HPI:  38 y.o. female presents to the office for follow up of bilateral knee osteoarthritis. At last  visit she received durolane injections which were only beneficial for 2-3 weeks. She continues to experience medial knee pain, right worse than left. She has some feelings of instability. She also notes that the right knee locks up on her and has had increased swelling. She was recently on vacation where she required a wheelchair to get around due to her bilateral knee pain. She has lost approximately 50 lbs since her last visit. She is interested in discussing further treatment options.       Allergy:  Allergies   Allergen Reactions    Bactrim [Sulfamethoxazole-Trimethoprim] Anaphylaxis and Edema     [facility] - [id number]: painful to swallow     Medications:  all current active meds have been reviewed  Past Medical History:  Past Medical History:   Diagnosis Date    Anxiety     Blood type, Rh negative     Resolved 2/18/2016     Migraine      Past Surgical History:  Past Surgical History:   Procedure Laterality Date    EAR SURGERY      Last impression 8/5/2015     MYRINGOTOMY      TONSILLECTOMY      WISDOM TOOTH EXTRACTION       Family History:  Family History   Problem Relation Age of Onset    Goiter Mother     Breast cancer Mother     Polycythemia Father     Colon cancer Maternal Grandmother     Diabetes Maternal Grandfather     Uterine cancer Paternal Grandmother     Diabetes Paternal Grandfather     Hyperthyroidism Maternal Aunt     Alcohol abuse Neg Hx     Substance Abuse Neg Hx     Mental illness Neg Hx      Social History:  Social History     Substance and Sexual Activity   Alcohol Use Yes    Comment: Socially     Social History     Substance and Sexual Activity   Drug Use No     Social History     Tobacco Use   Smoking Status Never   Smokeless Tobacco Never           ROS:  General: Per HPI  Skin: Negative, except if noted below  HEENT: Negative  Respiratory: Negative  Cardiovascular: Negative  Gastrointestinal: Negative  Urinary: Negative  Vascular: Negative  Musculoskeletal: Positive per HPI   Neurologic:  "Positive per HPI  Endocrine: Negative    Objective:  BP Readings from Last 1 Encounters:   01/12/24 130/80      Wt Readings from Last 1 Encounters:   01/12/24 93.4 kg (206 lb)        Respiratory:   non-labored respirations    Lymphatics:  no palpable lymph nodes    Gait:   Antalgic     Neurologic:   Alert and oriented times 3  Patient with normal sensation except as noted below  Deep tendon reflexes 2+ except as noted in MSK exam     Bilateral Lower Extremity:  Left Knee:      Inspection:  Skin intact    Overall limb alignment varus    Effusion: none    ROM 0-115    Extensor Lag: none    Palpation: medial Joint line tenderness to palpation    AP Stability at 90 deg stable    M/L stability in full extension stable    M/L stability in midflexion stable    Motor: 5/5 IP/Q/HS/TA/GS    Pulses: 2+ DP / 2+ PT    SILT DP/SP/S/S/TN     Right knee:     Inspection:  Skin incact    Overall limb alignment varus    Effusion: moderate    ROM 5-115 with pain    Extensor Lag: none    Palpation: medial Joint line tenderness to palpation    AP Stability at 90 deg stable    M/L stability in full extension stable    M/L stability in midflexion stable    Motor: 5/5 IP/Q/HS/TA/GS    Pulses: 2+ DP / 2+ PT    SILT DP/SP/S/S/TN    Imaging:  No new imaging obtained today    BMI:   Estimated body mass index is 36.49 kg/m² as calculated from the following:    Height as of this encounter: 5' 3\" (1.6 m).    Weight as of this encounter: 93.4 kg (206 lb).  BSA:   Estimated body surface area is 1.96 meters squared as calculated from the following:    Height as of this encounter: 5' 3\" (1.6 m).    Weight as of this encounter: 93.4 kg (206 lb).         Large joint arthrocentesis: L knee  Universal Protocol:  Consent: Verbal consent obtained.  Risks and benefits: risks, benefits and alternatives were discussed  Consent given by: patient  Time out: Immediately prior to procedure a \"time out\" was called to verify the correct patient, procedure, " equipment, support staff and site/side marked as required.  Timeout called at: 1/12/2024 9:46 AM.  Patient understanding: patient states understanding of the procedure being performed  Site marked: the operative site was marked  Patient identity confirmed: verbally with patient  Supporting Documentation  Indications: pain   Procedure Details  Location: knee - L knee  Preparation: Patient was prepped and draped in the usual sterile fashion  Needle size: 22 G  Ultrasound guidance: no  Approach: anterolateral  Medications administered: 4 mL lidocaine 1 %; 40 mg triamcinolone acetonide 40 mg/mL    Patient tolerance: patient tolerated the procedure well with no immediate complications  Dressing:  Sterile dressing applied          Scribe Attestation      I,:  Shannon Brennan am acting as a scribe while in the presence of the attending physician.:       I,:  León Gray DO personally performed the services described in this documentation    as scribed in my presence.:

## 2024-01-21 DIAGNOSIS — R11.0 NAUSEA: ICD-10-CM

## 2024-01-22 RX ORDER — ONDANSETRON 4 MG/1
4 TABLET, FILM COATED ORAL EVERY 8 HOURS PRN
Qty: 20 TABLET | Refills: 0 | Status: SHIPPED | OUTPATIENT
Start: 2024-01-22

## 2024-01-28 ENCOUNTER — HOSPITAL ENCOUNTER (OUTPATIENT)
Dept: MRI IMAGING | Facility: HOSPITAL | Age: 39
Discharge: HOME | End: 2024-01-28
Attending: STUDENT IN AN ORGANIZED HEALTH CARE EDUCATION/TRAINING PROGRAM
Payer: COMMERCIAL

## 2024-01-28 DIAGNOSIS — M17.11 PRIMARY OSTEOARTHRITIS OF RIGHT KNEE: ICD-10-CM

## 2024-01-28 PROCEDURE — G1004 CDSM NDSC: HCPCS

## 2024-01-28 PROCEDURE — 73721 MRI JNT OF LWR EXTRE W/O DYE: CPT

## 2024-02-01 ENCOUNTER — TELEPHONE (OUTPATIENT)
Dept: OBGYN CLINIC | Facility: HOSPITAL | Age: 39
End: 2024-02-01

## 2024-02-01 LAB
DME PARACHUTE DELIVERY DATE REQUESTED: NORMAL
DME PARACHUTE ITEM DESCRIPTION: NORMAL
DME PARACHUTE ORDER STATUS: NORMAL
DME PARACHUTE SUPPLIER NAME: NORMAL
DME PARACHUTE SUPPLIER PHONE: NORMAL

## 2024-02-01 NOTE — TELEPHONE ENCOUNTER
Preoperative Elective Admission Assessment    Living Situation:    Who does pt live with: spouse  What kind of home: multi-level  How many levels in home: 2   # of steps to enter home: 2  # of steps to second floor: 12-14  Are there handrails: Yes  Are there landings: Yes  Sleeping arrangement: second floor  Where is Bathroom: first and second floor  Where is the tub or shower: second floor, walk in shower     First Floor Setup:   Is there a bathroom: Yes, 1/2 bath   Where would pt sleep: bed, working on making arrangements on first floor      DME:  DME ordered, RW 2/1/24. Instructed to bring RW on DOS.     We discussed clearing pathways in the home and making sure there is accessibly to use the walker, for example, removing throw rugs.      Patient's Current Level of Function: Ambulates: Independently and ADLs: Independent    Post-op Caregiver: spouse  Caregiver Name and phone number for Inpatient discharge needs:     Steve Madera (Spouse)  [phone]      Currently receive any HHC/aides/community supports: No     Post-op Transport: spouse  To/from hospital: spouse  To/from PT 2-3x/week: spouse  Uses community transport now: No     Outpatient Physical Therapy Site:  Site: [place]  pre and post-op appts scheduled? Yes     Medication Management: self  Preferred Pharmacy for Post-op Medications: [facility] - [address] [facility], [[id number]]  Meds to Beds   Blood Management Vitamin Regimen:  Pt confirmed she has preop BM vitamins at home and will begin taking 30 days prior.     Post-op anticoagulant: to be determined by surgical team postoperatively      DC Plan: Pt plans to be discharged home    Barriers to DC identified preoperatively: none identified    BMI: 36.49    Patient Education:  Pt educated on post-op pain, early mobilization (POD0), LOS goals, OP PT goals, and preoperative bathing. Patient educated that our goal is to appropriately discharge patient based off their post-op  function while striving to maintain maximal independence. The goal is to discharge patient to home and for them to attend outpatient physical therapy.    Assigned to care team? Yes    SW referral: n/a

## 2024-02-12 ENCOUNTER — APPOINTMENT (OUTPATIENT)
Dept: LAB | Facility: HOSPITAL | Age: 39
End: 2024-02-12
Payer: COMMERCIAL

## 2024-02-12 DIAGNOSIS — M17.11 PRIMARY OSTEOARTHRITIS OF RIGHT KNEE: ICD-10-CM

## 2024-02-12 DIAGNOSIS — Z00.00 UNSPECIFIED EXAMINATION: ICD-10-CM

## 2024-02-12 LAB
ALBUMIN SERPL BCP-MCNC: 4.7 G/DL (ref 3.5–5)
ALP SERPL-CCNC: 40 U/L (ref 34–104)
ALT SERPL W P-5'-P-CCNC: 14 U/L (ref 7–52)
ANION GAP SERPL CALCULATED.3IONS-SCNC: 7 MMOL/L
APTT PPP: 26 SECONDS (ref 23–37)
AST SERPL W P-5'-P-CCNC: 14 U/L (ref 13–39)
BASOPHILS # BLD AUTO: 0.04 THOUSANDS/ÂΜL (ref 0–0.1)
BASOPHILS NFR BLD AUTO: 1 % (ref 0–1)
BILIRUB SERPL-MCNC: 0.43 MG/DL (ref 0.2–1)
BUN SERPL-MCNC: 15 MG/DL (ref 5–25)
CALCIUM SERPL-MCNC: 9.9 MG/DL (ref 8.4–10.2)
CHLORIDE SERPL-SCNC: 101 MMOL/L (ref 96–108)
CO2 SERPL-SCNC: 30 MMOL/L (ref 21–32)
CREAT SERPL-MCNC: 0.85 MG/DL (ref 0.6–1.3)
EOSINOPHIL # BLD AUTO: 0.09 THOUSAND/ÂΜL (ref 0–0.61)
EOSINOPHIL NFR BLD AUTO: 1 % (ref 0–6)
ERYTHROCYTE [DISTWIDTH] IN BLOOD BY AUTOMATED COUNT: 13.2 % (ref 11.6–15.1)
GFR SERPL CREATININE-BSD FRML MDRD: 85 ML/MIN/1.73SQ M
GLUCOSE P FAST SERPL-MCNC: 65 MG/DL (ref 65–99)
HCT VFR BLD AUTO: 44.5 % (ref 34.8–46.1)
HGB BLD-MCNC: 14.5 G/DL (ref 11.5–15.4)
IMM GRANULOCYTES # BLD AUTO: 0.04 THOUSAND/UL (ref 0–0.2)
IMM GRANULOCYTES NFR BLD AUTO: 1 % (ref 0–2)
INR PPP: 0.93 (ref 0.84–1.19)
LYMPHOCYTES # BLD AUTO: 2.01 THOUSANDS/ÂΜL (ref 0.6–4.47)
LYMPHOCYTES NFR BLD AUTO: 28 % (ref 14–44)
MCH RBC QN AUTO: 29.4 PG (ref 26.8–34.3)
MCHC RBC AUTO-ENTMCNC: 32.6 G/DL (ref 31.4–37.4)
MCV RBC AUTO: 90 FL (ref 82–98)
MONOCYTES # BLD AUTO: 0.45 THOUSAND/ÂΜL (ref 0.17–1.22)
MONOCYTES NFR BLD AUTO: 6 % (ref 4–12)
NEUTROPHILS # BLD AUTO: 4.54 THOUSANDS/ÂΜL (ref 1.85–7.62)
NEUTS SEG NFR BLD AUTO: 63 % (ref 43–75)
NRBC BLD AUTO-RTO: 0 /100 WBCS
PLATELET # BLD AUTO: 348 THOUSANDS/UL (ref 149–390)
PMV BLD AUTO: 9.8 FL (ref 8.9–12.7)
POTASSIUM SERPL-SCNC: 4.5 MMOL/L (ref 3.5–5.3)
PROT SERPL-MCNC: 7.7 G/DL (ref 6.4–8.4)
PROTHROMBIN TIME: 12.8 SECONDS (ref 11.6–14.5)
RBC # BLD AUTO: 4.93 MILLION/UL (ref 3.81–5.12)
SODIUM SERPL-SCNC: 138 MMOL/L (ref 135–147)
WBC # BLD AUTO: 7.17 THOUSAND/UL (ref 4.31–10.16)

## 2024-02-12 PROCEDURE — 86901 BLOOD TYPING SEROLOGIC RH(D): CPT | Performed by: STUDENT IN AN ORGANIZED HEALTH CARE EDUCATION/TRAINING PROGRAM

## 2024-02-12 PROCEDURE — 86900 BLOOD TYPING SEROLOGIC ABO: CPT | Performed by: STUDENT IN AN ORGANIZED HEALTH CARE EDUCATION/TRAINING PROGRAM

## 2024-02-12 PROCEDURE — 86850 RBC ANTIBODY SCREEN: CPT | Performed by: STUDENT IN AN ORGANIZED HEALTH CARE EDUCATION/TRAINING PROGRAM

## 2024-02-12 PROCEDURE — 80053 COMPREHEN METABOLIC PANEL: CPT

## 2024-02-12 PROCEDURE — 85610 PROTHROMBIN TIME: CPT

## 2024-02-12 PROCEDURE — 83036 HEMOGLOBIN GLYCOSYLATED A1C: CPT

## 2024-02-12 PROCEDURE — 36415 COLL VENOUS BLD VENIPUNCTURE: CPT

## 2024-02-12 PROCEDURE — 85730 THROMBOPLASTIN TIME PARTIAL: CPT

## 2024-02-12 PROCEDURE — 85025 COMPLETE CBC W/AUTO DIFF WBC: CPT

## 2024-02-12 NOTE — PRE-PROCEDURE INSTRUCTIONS
Pre-Surgery Instructions:   Medication Instructions    ALPRAZolam (XANAX) 0.25 mg tablet Uses PRN- OK to take day of surgery- if needed may take with sip of water    ascorbic acid (VITAMIN C) 500 MG tablet Instructed to use up to and including 3/6/24    cholecalciferol (VITAMIN D3) 1,000 units tablet Instructed to use up to and including 3/6/24     folic acid (FOLVITE) 1 mg tablet Instructed to use up to and including 3/6/24     levonorgestrel (MIRENA) 20 MCG/24HR IUD IUD in place     Multiple Vitamins-Minerals (multivitamin with minerals) tablet Instructed to use up to and including 3/6/24     NON FORMULARY Take day of surgery. With sip of water     ondansetron (ZOFRAN) 4 mg tablet Uses PRN- OK to take day of surgery- if needed may take with sip of water     Semaglutide-Weight Management (WEGOVY) 1.7 MG/0.75ML Stop taking 7 days prior to surgery. Including DOS per anes guidelines     venlafaxine (EFFEXOR-XR) 150 mg 24 hr capsule Take day of surgery. With sip of water     Pt has cleanser and instructions of use for both night before and DOS showering. Pt verbalized understanding of instructions given.    Pt instructed no hair or body products on skin for DOS  Pt also instructed no razor blade shaving within one week prior to surgery- ok to use electric razor up till 3/5/24 - no shaving 3/6 or 3/7.    Pt instructed if with any health status changes between now and DOS -notify surgeon office    Pt has ordered walker - shantell Snider (ONSUMIT) -phone# if needed with any questions moving forward.     Medication instructions for day surgery reviewed. Please use only a sip of water to take your instructed medications. Avoid all over the counter vitamins, supplements and NSAIDS for one week prior to surgery per anesthesia guidelines. Tylenol is ok to take as needed.     You will receive a call one business day prior to surgery with an arrival time and hospital directions. If your surgery is scheduled on a Monday, the  hospital will be calling you on the Friday prior to your surgery. If you have not heard from anyone by 8pm, please call the hospital supervisor through the hospital  at [phone]. ([place] [phone] or [place] [phone]).    Do not eat or drink anything after midnight the night before your surgery, including candy, mints, lifesavers, or chewing gum. Do not drink alcohol 24hrs before your surgery. Try not to smoke at least 24hrs before your surgery.       Follow the pre surgery showering instructions as listed in the “My Surgical Experience Booklet” or otherwise provided by your surgeon's office. Do not use a blade to shave the surgical area 1 week before surgery. It is okay to use a clean electric clippers up to 24 hours before surgery. Do not apply any lotions, creams, including makeup, cologne, deodorant, or perfumes after showering on the day of your surgery. Do not use dry shampoo, hair spray, hair gel, or any type of hair products.     No contact lenses, eye make-up, or artificial eyelashes. Remove nail polish, including gel polish, and any artificial, gel, or acrylic nails if possible. Remove all jewelry including rings and body piercing jewelry.     Wear causal clothing that is easy to take on and off. Consider your type of surgery.    Keep any valuables, jewelry, piercings at home. Please bring any specially ordered equipment (sling, braces) if indicated.    Arrange for a responsible person to drive you to and from the hospital on the day of your surgery. Visitor Guidelines discussed.     Call the surgeon's office with any new illnesses, exposures, or additional questions prior to surgery.    Please reference your “My Surgical Experience Booklet” for additional information to prepare for your upcoming surgery.

## 2024-02-13 ENCOUNTER — LAB REQUISITION (OUTPATIENT)
Dept: LAB | Facility: HOSPITAL | Age: 39
End: 2024-02-13
Payer: COMMERCIAL

## 2024-02-13 DIAGNOSIS — Z00.00 ENCOUNTER FOR GENERAL ADULT MEDICAL EXAMINATION WITHOUT ABNORMAL FINDINGS: ICD-10-CM

## 2024-02-13 LAB
ABO GROUP BLD: NORMAL
BLD GP AB SCN SERPL QL: NEGATIVE
EST. AVERAGE GLUCOSE BLD GHB EST-MCNC: 100 MG/DL
HBA1C MFR BLD: 5.1 %
RH BLD: NEGATIVE
SPECIMEN EXPIRATION DATE: NORMAL

## 2024-02-14 ENCOUNTER — OFFICE VISIT (OUTPATIENT)
Dept: FAMILY MEDICINE CLINIC | Facility: CLINIC | Age: 39
End: 2024-02-14
Payer: COMMERCIAL

## 2024-02-14 VITALS
SYSTOLIC BLOOD PRESSURE: 128 MMHG | HEIGHT: 63 IN | TEMPERATURE: 97.8 F | HEART RATE: 78 BPM | RESPIRATION RATE: 16 BRPM | BODY MASS INDEX: 36.14 KG/M2 | OXYGEN SATURATION: 98 % | WEIGHT: 204 LBS | DIASTOLIC BLOOD PRESSURE: 84 MMHG

## 2024-02-14 DIAGNOSIS — M17.11 PRIMARY OSTEOARTHRITIS OF RIGHT KNEE: ICD-10-CM

## 2024-02-14 DIAGNOSIS — G43.909 MIGRAINE WITHOUT STATUS MIGRAINOSUS, NOT INTRACTABLE, UNSPECIFIED MIGRAINE TYPE: ICD-10-CM

## 2024-02-14 DIAGNOSIS — Z12.31 ENCOUNTER FOR SCREENING MAMMOGRAM FOR BREAST CANCER: ICD-10-CM

## 2024-02-14 DIAGNOSIS — E66.01 MORBID OBESITY WITH BMI OF 40.0-44.9, ADULT (HCC): ICD-10-CM

## 2024-02-14 DIAGNOSIS — Z01.818 PRE-OP EXAM: Primary | ICD-10-CM

## 2024-02-14 LAB
ATRIAL RATE: 81 BPM
DME PARACHUTE DELIVERY DATE ACTUAL: NORMAL
DME PARACHUTE DELIVERY DATE REQUESTED: NORMAL
DME PARACHUTE ITEM DESCRIPTION: NORMAL
DME PARACHUTE ORDER STATUS: NORMAL
DME PARACHUTE SUPPLIER NAME: NORMAL
DME PARACHUTE SUPPLIER PHONE: NORMAL
P AXIS: 48 DEGREES
PR INTERVAL: 146 MS
QRS AXIS: 60 DEGREES
QRSD INTERVAL: 98 MS
QT INTERVAL: 398 MS
QTC INTERVAL: 462 MS
T WAVE AXIS: 45 DEGREES
VENTRICULAR RATE: 81 BPM

## 2024-02-14 PROCEDURE — 99214 OFFICE O/P EST MOD 30 MIN: CPT | Performed by: PHYSICIAN ASSISTANT

## 2024-02-14 NOTE — PROGRESS NOTES
[facility] PRE-OPERATIVE EVALUATION  [facility] PHYSICIAN GROUP - [facility] PRACTICE    NAME: Kaitlynn Madera  AGE: 39 y.o. SEX: female  : 1985     DATE: 2024    [facility] Pre-Operative Evaluation      Chief Complaint: Pre-operative Evaluation     Surgery: ARTHROPLASTY KNEE UNICOMPARTMENTAL vs TKA- SAME DAY (Right: Knee)   Anticipated Date of Surgery: 3/7/2024  Referring Provider: Dr Gray     History of Present Illness:     Kaitlynn Madera is a 39 y.o. female who presents to the office today for a preoperative consultation at the request of surgeon, Dr Gray, who plans on performing ARTHROPLASTY KNEE UNICOMPARTMENTAL vs TKA- SAME DAY (Right: Knee)  on 3/7/2024. Planned anesthesia is  choice . Patient has a bleeding risk of: no recent abnormal bleeding. Patient does not have objections to receiving blood products if needed. Current anti-platelet/anti-coagulation medications that the patient is prescribed includes:  none .      Assessment of Chronic Conditions:   - obesity - on wegovy, will hold 1 week prior to  - migraines - Effexor, relpax as needed     Assessment of Cardiac Risk:  Denies unstable or severe angina or MI in the last 6 weeks or history of stent placement in the last year   Denies decompensated heart failure (e.g. New onset heart failure, NYHA functional class IV heart failure, or worsening existing heart failure)  Denies significant arrhythmias such as high grade AV block, symptomatic ventricular arrhythmia, newly recognized ventricular tachycardia, supraventricular tachycardia with resting heart rate >100, or symptomatic bradycardia  Denies severe heart valve disease including aortic stenosis or symptomatic mitral stenosis     Exercise Capacity:  Able to walk 4 blocks without symptoms?: Yes  Able to walk 2 flights without symptoms?: Yes    Prior Anesthesia Reactions: Yes, nausea. With epidural low BP     Personal history of venous  thromboembolic disease? No    History of steroid use for >2 weeks within last year? No         Review of Systems:     Review of Systems   Constitutional: Negative.    HENT: Negative.     Eyes: Negative.    Respiratory: Negative.     Cardiovascular: Negative.    Gastrointestinal: Negative.    Endocrine: Negative.    Genitourinary: Negative.    Musculoskeletal: Negative.    Skin: Negative.    Allergic/Immunologic: Negative.    Neurological: Negative.    Hematological: Negative.    Psychiatric/Behavioral: Negative.         Current Problem List:     Patient Active Problem List   Diagnosis    Migraines    Motion sickness    Depression with anxiety    Morbid obesity with BMI of 40.0-44.9, adult (HCC)    Radiculitis of right cervical region    IUD (intrauterine device) in place    Mixed hyperlipidemia    Primary osteoarthritis of right knee       Allergies:     Allergies   Allergen Reactions    Bactrim [Sulfamethoxazole-Trimethoprim] Anaphylaxis and Edema     Annotation - [id number]: painful to swallow       Current Medications:       Current Outpatient Medications:     ALPRAZolam (XANAX) 0.25 mg tablet, Take 1 tablet (0.25 mg total) by mouth 4 (four) times a day as needed for anxiety, Disp: 30 tablet, Rfl: 0    ascorbic acid (VITAMIN C) 500 MG tablet, Take 1 tablet (500 mg total) by mouth 2 (two) times a day (Patient taking differently: Take 500 mg by mouth 2 (two) times a day Surgeon prescribed), Disp: 60 tablet, Rfl: 1    cholecalciferol (VITAMIN D3) 1,000 units tablet, Take 2 tablets (2,000 Units total) by mouth daily (Patient taking differently: Take 2,000 Units by mouth daily Surgeon prescribed), Disp: 60 tablet, Rfl: 1    diclofenac (VOLTAREN) 75 mg EC tablet, Take 1 tablet (75 mg total) by mouth 2 (two) times a day, Disp: 60 tablet, Rfl: 0    diclofenac (VOLTAREN) 75 mg EC tablet, Take 1 tablet (75 mg total) by mouth 2 (two) times a day, Disp: 60 tablet, Rfl: 1    eletriptan (RELPAX) 40 MG tablet, Take 1 tablet (40  mg total) by mouth once as needed for migraine for up to 1 dose may repeat in 2 hours if necessary, Disp: 18 tablet, Rfl: 0    folic acid (FOLVITE) 1 mg tablet, Take 1 tablet (1 mg total) by mouth daily (Patient taking differently: Take 1 mg by mouth daily Surgeon prescribed), Disp: 30 tablet, Rfl: 1    hydrocortisone (ANUSOL-HC) 2.5 % rectal cream, Apply topically 2 (two) times a day For 3 days after having a Sitz bath, Disp: 28 g, Rfl: 1    levonorgestrel (MIRENA) 20 MCG/24HR IUD, 1 each by Intrauterine route once, Disp: , Rfl:     mometasone (ELOCON) 0.1 % cream, Apply topically 2 (two) times a day, Disp: 45 g, Rfl: 2    Multiple Vitamins-Minerals (multivitamin with minerals) tablet, Take 1 tablet by mouth daily (Patient taking differently: Take 1 tablet by mouth daily Surgeon prescribed), Disp: 30 tablet, Rfl: 1    NON FORMULARY, in the morning Stool softner daily, Disp: , Rfl:     ondansetron (ZOFRAN) 4 mg tablet, Take 1 tablet (4 mg total) by mouth every 8 (eight) hours as needed for nausea or vomiting, Disp: 20 tablet, Rfl: 0    Semaglutide-Weight Management (WEGOVY) 1.7 MG/0.75ML, Inject 0.75 mL (1.7 mg total) under the skin once a week (Patient taking differently: Inject 1.7 mg under the skin once a week Tuesdays), Disp: 3 mL, Rfl: 0    venlafaxine (EFFEXOR-XR) 150 mg 24 hr capsule, Take 1 capsule (150 mg total) by mouth daily with breakfast, Disp: 90 capsule, Rfl: 0    Past Medical History:       Past Medical History:   Diagnosis Date    Anemia     hx of anemia - per pt was told with trying to donate blood    Anesthesia     2/12/24 per pt - reports with epidural use - pt experienced big BP drop    Anxiety     Blood type, Rh negative     Resolved 2/18/2016     Kidney stone     Knee pain, right     Migraine     PONV (postoperative nausea and vomiting)         Past Surgical History:   Procedure Laterality Date    EAR SURGERY      Last impression 8/5/2015     MYRINGOTOMY      TONSILLECTOMY      WISDOM TOOTH  "EXTRACTION          Family History   Problem Relation Age of Onset    Goiter Mother     Breast cancer Mother     Polycythemia Father     Colon cancer Maternal Grandmother     Diabetes Maternal Grandfather     Uterine cancer Paternal Grandmother     Diabetes Paternal Grandfather     Hyperthyroidism Maternal Aunt     Alcohol abuse Neg Hx     Substance Abuse Neg Hx     Mental illness Neg Hx     Anesthesia problems Neg Hx         Social History     Socioeconomic History    Marital status: /Civil Union     Spouse name: Not on file    Number of children: Not on file    Years of education: Not on file    Highest education level: Not on file   Occupational History    Not on file   Tobacco Use    Smoking status: Never    Smokeless tobacco: Never    Tobacco comments:     Never a smoker or use of any tobacco products per pt    Vaping Use    Vaping status: Never Used   Substance and Sexual Activity    Alcohol use: Yes     Comment: Socially-- avg use couple times a month    Drug use: No     Comment: Denies any drug use per pt    Sexual activity: Yes     Partners: Male     Birth control/protection: I.U.D.     Comment: Denies any chest pain or shortness of breath with activity   Other Topics Concern    Not on file   Social History Narrative    Alcohol use - as per [employer]    Always uses seat belt    Daily caffeine consumption, 1 serving a day - as per [employer]     Has smoke detectors        Occasional caffeine consumption - as per [employer]      Social Determinants of Health     Financial Resource Strain: Not on file   Food Insecurity: Not on file   Transportation Needs: Not on file   Physical Activity: Not on file   Stress: Not on file   Social Connections: Not on file   Intimate Partner Violence: Not on file   Housing Stability: Not on file        Physical Exam:     /84   Pulse 78   Temp 97.8 °F (36.6 °C) (Temporal)   Resp 16   Ht 5' 3\" (1.6 m)   Wt 92.5 kg (204 lb)   LMP 01/23/2024 (Approximate) " Comment: IUD in place  SpO2 98%   BMI 36.14 kg/m²     Physical Exam  Constitutional:       Appearance: Normal appearance. She is well-developed and normal weight.   HENT:      Head: Normocephalic and atraumatic.   Eyes:      Extraocular Movements: Extraocular movements intact.      Conjunctiva/sclera: Conjunctivae normal.      Pupils: Pupils are equal, round, and reactive to light.   Neck:      Thyroid: No thyromegaly.   Cardiovascular:      Rate and Rhythm: Normal rate and regular rhythm.      Pulses: Normal pulses.      Heart sounds: Normal heart sounds. No murmur heard.  Pulmonary:      Effort: Pulmonary effort is normal. No respiratory distress.      Breath sounds: Normal breath sounds. No wheezing or rales.   Abdominal:      General: Abdomen is flat. Bowel sounds are normal. There is no distension.      Palpations: Abdomen is soft. There is no mass.      Tenderness: There is no abdominal tenderness.   Musculoskeletal:         General: Normal range of motion.      Cervical back: Normal range of motion and neck supple. No rigidity or tenderness.   Lymphadenopathy:      Cervical: No cervical adenopathy.   Skin:     General: Skin is warm and dry.   Neurological:      General: No focal deficit present.      Mental Status: She is alert and oriented to person, place, and time.      Cranial Nerves: No cranial nerve deficit.      Deep Tendon Reflexes: Reflexes are normal and symmetric.   Psychiatric:         Mood and Affect: Mood normal.         Behavior: Behavior normal.         Thought Content: Thought content normal.         Judgment: Judgment normal.          Data:     Pre-operative work-up    Laboratory Results: I have personally reviewed the pertinent laboratory results/reports      EKG: I have personally reviewed pertinent reports.      Chest x-ray:  not indicated      Previous cardiopulmonary studies within the past year:  Echocardiogram: none  Cardiac Catheterization: none  Stress Test: none  Pulmonary  Function Testing: none      Assessment & Recommendations:     1. Encounter for screening mammogram for breast cancer  Mammo screening bilateral w 3d & cad      2. Primary osteoarthritis of right knee  Ambulatory referral to [facility]          Pre-Op Evaluation Assessment  39 y.o. female with planned surgery: ARTHROPLASTY KNEE UNICOMPARTMENTAL vs TKA- SAME DAY (Right: Knee) .    Known risk factors for perioperative complications: None.        Current medications which may produce withdrawal symptoms if withheld perioperatively: none.    Pre-Op Evaluation Plan  1. Further preoperative workup as follows:   - None; no further preoperative work-up is required    2. Medication Management/Recommendations:   - hold Wegovy 1 week prior to surgery  - can take Effexor with small sip of water    3. Prophylaxis for cardiac events with perioperative beta-blockers: not indicated.    4. Patient requires further consultation with: None    Clearance  Patient is CLEARED for surgery without any additional cardiac testing.     Shannon Murrieta PA-C  [facility]  [address] [facility] [place]  [address] [address]  Phone#  [phone]  Fax#  [phone]

## 2024-02-14 NOTE — H&P (VIEW-ONLY)
[facility] PRE-OPERATIVE EVALUATION  [facility] PHYSICIAN GROUP - [facility] PRACTICE    NAME: Kaitlynn Madera  AGE: 39 y.o. SEX: female  : 1985     DATE: 2024    [facility] Pre-Operative Evaluation      Chief Complaint: Pre-operative Evaluation     Surgery: ARTHROPLASTY KNEE UNICOMPARTMENTAL vs TKA- SAME DAY (Right: Knee)   Anticipated Date of Surgery: 3/7/2024  Referring Provider: Dr Gray     History of Present Illness:     Kaitlynn Madera is a 39 y.o. female who presents to the office today for a preoperative consultation at the request of surgeon, Dr Gray, who plans on performing ARTHROPLASTY KNEE UNICOMPARTMENTAL vs TKA- SAME DAY (Right: Knee)  on 3/7/2024. Planned anesthesia is  choice . Patient has a bleeding risk of: no recent abnormal bleeding. Patient does not have objections to receiving blood products if needed. Current anti-platelet/anti-coagulation medications that the patient is prescribed includes:  none .      Assessment of Chronic Conditions:   - obesity - on wegovy, will hold 1 week prior to  - migraines - Effexor, relpax as needed     Assessment of Cardiac Risk:  Denies unstable or severe angina or MI in the last 6 weeks or history of stent placement in the last year   Denies decompensated heart failure (e.g. New onset heart failure, NYHA functional class IV heart failure, or worsening existing heart failure)  Denies significant arrhythmias such as high grade AV block, symptomatic ventricular arrhythmia, newly recognized ventricular tachycardia, supraventricular tachycardia with resting heart rate >100, or symptomatic bradycardia  Denies severe heart valve disease including aortic stenosis or symptomatic mitral stenosis     Exercise Capacity:  Able to walk 4 blocks without symptoms?: Yes  Able to walk 2 flights without symptoms?: Yes    Prior Anesthesia Reactions: Yes, nausea. With epidural low BP     Personal history of venous  thromboembolic disease? No    History of steroid use for >2 weeks within last year? No         Review of Systems:     Review of Systems   Constitutional: Negative.    HENT: Negative.     Eyes: Negative.    Respiratory: Negative.     Cardiovascular: Negative.    Gastrointestinal: Negative.    Endocrine: Negative.    Genitourinary: Negative.    Musculoskeletal: Negative.    Skin: Negative.    Allergic/Immunologic: Negative.    Neurological: Negative.    Hematological: Negative.    Psychiatric/Behavioral: Negative.         Current Problem List:     Patient Active Problem List   Diagnosis    Migraines    Motion sickness    Depression with anxiety    Morbid obesity with BMI of 40.0-44.9, adult (HCC)    Radiculitis of right cervical region    IUD (intrauterine device) in place    Mixed hyperlipidemia    Primary osteoarthritis of right knee       Allergies:     Allergies   Allergen Reactions    Bactrim [Sulfamethoxazole-Trimethoprim] Anaphylaxis and Edema     Annotation - [id number]: painful to swallow       Current Medications:       Current Outpatient Medications:     ALPRAZolam (XANAX) 0.25 mg tablet, Take 1 tablet (0.25 mg total) by mouth 4 (four) times a day as needed for anxiety, Disp: 30 tablet, Rfl: 0    ascorbic acid (VITAMIN C) 500 MG tablet, Take 1 tablet (500 mg total) by mouth 2 (two) times a day (Patient taking differently: Take 500 mg by mouth 2 (two) times a day Surgeon prescribed), Disp: 60 tablet, Rfl: 1    cholecalciferol (VITAMIN D3) 1,000 units tablet, Take 2 tablets (2,000 Units total) by mouth daily (Patient taking differently: Take 2,000 Units by mouth daily Surgeon prescribed), Disp: 60 tablet, Rfl: 1    diclofenac (VOLTAREN) 75 mg EC tablet, Take 1 tablet (75 mg total) by mouth 2 (two) times a day, Disp: 60 tablet, Rfl: 0    diclofenac (VOLTAREN) 75 mg EC tablet, Take 1 tablet (75 mg total) by mouth 2 (two) times a day, Disp: 60 tablet, Rfl: 1    eletriptan (RELPAX) 40 MG tablet, Take 1 tablet (40  mg total) by mouth once as needed for migraine for up to 1 dose may repeat in 2 hours if necessary, Disp: 18 tablet, Rfl: 0    folic acid (FOLVITE) 1 mg tablet, Take 1 tablet (1 mg total) by mouth daily (Patient taking differently: Take 1 mg by mouth daily Surgeon prescribed), Disp: 30 tablet, Rfl: 1    hydrocortisone (ANUSOL-HC) 2.5 % rectal cream, Apply topically 2 (two) times a day For 3 days after having a Sitz bath, Disp: 28 g, Rfl: 1    levonorgestrel (MIRENA) 20 MCG/24HR IUD, 1 each by Intrauterine route once, Disp: , Rfl:     mometasone (ELOCON) 0.1 % cream, Apply topically 2 (two) times a day, Disp: 45 g, Rfl: 2    Multiple Vitamins-Minerals (multivitamin with minerals) tablet, Take 1 tablet by mouth daily (Patient taking differently: Take 1 tablet by mouth daily Surgeon prescribed), Disp: 30 tablet, Rfl: 1    NON FORMULARY, in the morning Stool softner daily, Disp: , Rfl:     ondansetron (ZOFRAN) 4 mg tablet, Take 1 tablet (4 mg total) by mouth every 8 (eight) hours as needed for nausea or vomiting, Disp: 20 tablet, Rfl: 0    Semaglutide-Weight Management (WEGOVY) 1.7 MG/0.75ML, Inject 0.75 mL (1.7 mg total) under the skin once a week (Patient taking differently: Inject 1.7 mg under the skin once a week Tuesdays), Disp: 3 mL, Rfl: 0    venlafaxine (EFFEXOR-XR) 150 mg 24 hr capsule, Take 1 capsule (150 mg total) by mouth daily with breakfast, Disp: 90 capsule, Rfl: 0    Past Medical History:       Past Medical History:   Diagnosis Date    Anemia     hx of anemia - per pt was told with trying to donate blood    Anesthesia     2/12/24 per pt - reports with epidural use - pt experienced big BP drop    Anxiety     Blood type, Rh negative     Resolved 2/18/2016     Kidney stone     Knee pain, right     Migraine     PONV (postoperative nausea and vomiting)         Past Surgical History:   Procedure Laterality Date    EAR SURGERY      Last impression 8/5/2015     MYRINGOTOMY      TONSILLECTOMY      WISDOM TOOTH  "EXTRACTION          Family History   Problem Relation Age of Onset    Goiter Mother     Breast cancer Mother     Polycythemia Father     Colon cancer Maternal Grandmother     Diabetes Maternal Grandfather     Uterine cancer Paternal Grandmother     Diabetes Paternal Grandfather     Hyperthyroidism Maternal Aunt     Alcohol abuse Neg Hx     Substance Abuse Neg Hx     Mental illness Neg Hx     Anesthesia problems Neg Hx         Social History     Socioeconomic History    Marital status: /Civil Union     Spouse name: Not on file    Number of children: Not on file    Years of education: Not on file    Highest education level: Not on file   Occupational History    Not on file   Tobacco Use    Smoking status: Never    Smokeless tobacco: Never    Tobacco comments:     Never a smoker or use of any tobacco products per pt    Vaping Use    Vaping status: Never Used   Substance and Sexual Activity    Alcohol use: Yes     Comment: Socially-- avg use couple times a month    Drug use: No     Comment: Denies any drug use per pt    Sexual activity: Yes     Partners: Male     Birth control/protection: I.U.D.     Comment: Denies any chest pain or shortness of breath with activity   Other Topics Concern    Not on file   Social History Narrative    Alcohol use - as per [employer]    Always uses seat belt    Daily caffeine consumption, 1 serving a day - as per [employer]     Has smoke detectors        Occasional caffeine consumption - as per [employer]      Social Determinants of Health     Financial Resource Strain: Not on file   Food Insecurity: Not on file   Transportation Needs: Not on file   Physical Activity: Not on file   Stress: Not on file   Social Connections: Not on file   Intimate Partner Violence: Not on file   Housing Stability: Not on file        Physical Exam:     /84   Pulse 78   Temp 97.8 °F (36.6 °C) (Temporal)   Resp 16   Ht 5' 3\" (1.6 m)   Wt 92.5 kg (204 lb)   LMP 01/23/2024 (Approximate) " Comment: IUD in place  SpO2 98%   BMI 36.14 kg/m²     Physical Exam  Constitutional:       Appearance: Normal appearance. She is well-developed and normal weight.   HENT:      Head: Normocephalic and atraumatic.   Eyes:      Extraocular Movements: Extraocular movements intact.      Conjunctiva/sclera: Conjunctivae normal.      Pupils: Pupils are equal, round, and reactive to light.   Neck:      Thyroid: No thyromegaly.   Cardiovascular:      Rate and Rhythm: Normal rate and regular rhythm.      Pulses: Normal pulses.      Heart sounds: Normal heart sounds. No murmur heard.  Pulmonary:      Effort: Pulmonary effort is normal. No respiratory distress.      Breath sounds: Normal breath sounds. No wheezing or rales.   Abdominal:      General: Abdomen is flat. Bowel sounds are normal. There is no distension.      Palpations: Abdomen is soft. There is no mass.      Tenderness: There is no abdominal tenderness.   Musculoskeletal:         General: Normal range of motion.      Cervical back: Normal range of motion and neck supple. No rigidity or tenderness.   Lymphadenopathy:      Cervical: No cervical adenopathy.   Skin:     General: Skin is warm and dry.   Neurological:      General: No focal deficit present.      Mental Status: She is alert and oriented to person, place, and time.      Cranial Nerves: No cranial nerve deficit.      Deep Tendon Reflexes: Reflexes are normal and symmetric.   Psychiatric:         Mood and Affect: Mood normal.         Behavior: Behavior normal.         Thought Content: Thought content normal.         Judgment: Judgment normal.          Data:     Pre-operative work-up    Laboratory Results: I have personally reviewed the pertinent laboratory results/reports      EKG: I have personally reviewed pertinent reports.      Chest x-ray:  not indicated      Previous cardiopulmonary studies within the past year:  Echocardiogram: none  Cardiac Catheterization: none  Stress Test: none  Pulmonary  Function Testing: none      Assessment & Recommendations:     1. Encounter for screening mammogram for breast cancer  Mammo screening bilateral w 3d & cad      2. Primary osteoarthritis of right knee  [facility] to [facility]          Pre-Op Evaluation Assessment  39 y.o. female with planned surgery: ARTHROPLASTY KNEE UNICOMPARTMENTAL vs TKA- SAME DAY (Right: Knee) .    Known risk factors for perioperative complications: None.        Current medications which may produce withdrawal symptoms if withheld perioperatively: none.    Pre-Op Evaluation Plan  1. Further preoperative workup as follows:   - None; no further preoperative work-up is required    2. Medication Management/Recommendations:   - hold Wegovy 1 week prior to surgery  - can take Effexor with small sip of water    3. Prophylaxis for cardiac events with perioperative beta-blockers: not indicated.    4. Patient requires further consultation with: None    Clearance  Patient is CLEARED for surgery without any additional cardiac testing.     Shannon Murrieta PA-C  [facility]  [address] [place]  [address] [address]  Phone#  [phone]  Fax#  [phone]

## 2024-02-16 ENCOUNTER — OFFICE VISIT (OUTPATIENT)
Dept: OBGYN CLINIC | Facility: CLINIC | Age: 39
End: 2024-02-16
Payer: COMMERCIAL

## 2024-02-16 VITALS
SYSTOLIC BLOOD PRESSURE: 145 MMHG | DIASTOLIC BLOOD PRESSURE: 94 MMHG | WEIGHT: 204 LBS | HEIGHT: 63 IN | HEART RATE: 82 BPM | BODY MASS INDEX: 36.14 KG/M2

## 2024-02-16 DIAGNOSIS — M17.11 PRIMARY OSTEOARTHRITIS OF RIGHT KNEE: Primary | ICD-10-CM

## 2024-02-16 PROCEDURE — 99214 OFFICE O/P EST MOD 30 MIN: CPT | Performed by: STUDENT IN AN ORGANIZED HEALTH CARE EDUCATION/TRAINING PROGRAM

## 2024-02-16 NOTE — PROGRESS NOTES
Knee f/u Office Note    Assessment:     1. Primary osteoarthritis of right knee          Plan:       Findings today are consistent with right knee osteoarthritis. MRI reviewed in detail today with the patient, she is a good candidate for UKA.   The patient has elected to continue to proceed with right medial UKA vs TKA. Risks and benefits of surgery to include but not limited to bleeding, infection, damage to surrounding structures, hardware failure, instability, fracture, dislocation, need for further surgery, continued pain, stiffness, blood clots, stroke, and heart attack was discussed with the patient. Informed consent was signed today in the office. The patient has met with our surgical schedulers and our preoperative joint replacement pathway has been initiated. All questions were answered. Patient will follow-up 2 weeks post operatively. BMI is appropriate at 36.49 and is a nonsmoker.      Subjective:     Patient ID: Kaitlynn Madera is a 39 y.o. female.  Chief Complaint:  HPI:  39 y.o. female presents to the office for follow up of bilateral knee osteoarthritis, right more symptomatic than left. Since last visit, she has obtained preop MRI of the right knee and has seen her PCP. She returns today to discuss MRI and re discuss UKA. She continues to have persistent pain in the medial compartment which affects her ADLs. She has lost a great amount of weight and BMI is at 36. Her continued weight-loss is hindered by her knee pain.       Allergy:  Allergies   Allergen Reactions    Bactrim [Sulfamethoxazole-Trimethoprim] Anaphylaxis and Edema     Annotation - [id number]: painful to swallow     Medications:  all current active meds have been reviewed  Past Medical History:  Past Medical History:   Diagnosis Date    Anemia     hx of anemia - per pt was told with trying to donate blood    Anesthesia     2/12/24 per pt - reports with epidural use - pt experienced big BP drop    Anxiety     Blood type, Rh negative      Resolved 2/18/2016     Kidney stone     Knee pain, right     Migraine     PONV (postoperative nausea and vomiting)      Past Surgical History:  Past Surgical History:   Procedure Laterality Date    EAR SURGERY      Last impression 8/5/2015     MYRINGOTOMY      TONSILLECTOMY      WISDOM TOOTH EXTRACTION       Family History:  Family History   Problem Relation Age of Onset    Goiter Mother     Breast cancer Mother     Polycythemia Father     Colon cancer Maternal Grandmother     Diabetes Maternal Grandfather     Uterine cancer Paternal Grandmother     Diabetes Paternal Grandfather     Hyperthyroidism Maternal Aunt     Alcohol abuse Neg Hx     Substance Abuse Neg Hx     Mental illness Neg Hx     Anesthesia problems Neg Hx      Social History:  Social History     Substance and Sexual Activity   Alcohol Use Yes    Comment: Socially-- avg use couple times a month     Social History     Substance and Sexual Activity   Drug Use No    Comment: Denies any drug use per pt     Social History     Tobacco Use   Smoking Status Never   Smokeless Tobacco Never   Tobacco Comments    Never a smoker or use of any tobacco products per pt            ROS:  General: Per HPI  Skin: Negative, except if noted below  HEENT: Negative  Respiratory: Negative  Cardiovascular: Negative  Gastrointestinal: Negative  Urinary: Negative  Vascular: Negative  Musculoskeletal: Positive per HPI   Neurologic: Positive per HPI  Endocrine: Negative    Objective:  BP Readings from Last 1 Encounters:   02/16/24 145/94      Wt Readings from Last 1 Encounters:   02/16/24 92.5 kg (204 lb)        Respiratory:   non-labored respirations    Lymphatics:  no palpable lymph nodes    Gait:   Antalgic     Neurologic:   Alert and oriented times 3  Patient with normal sensation except as noted below  Deep tendon reflexes 2+ except as noted in MSK exam     Right Lower Extremity:     Right knee:     Inspection:  Skin incact    Overall limb alignment varus    Effusion:  "moderate    ROM 5-115 with pain    Extensor Lag: none    Palpation: medial Joint line tenderness to palpation    AP Stability at 90 deg stable    M/L stability in full extension stable    M/L stability in midflexion stable    Motor: 5/5 IP/Q/HS/TA/GS    Pulses: 2+ DP / 2+ PT    SILT DP/SP/S/S/TN    Imaging:  MRI Right knee 1/28/24: severe loss of cartilage in the medial compartment and complex medial meniscal tear; no degeneration seen in the lateral or patellofemoral compartments. ACL and PCL intact.     BMI:   Estimated body mass index is 36.14 kg/m² as calculated from the following:    Height as of this encounter: 5' 3\" (1.6 m).    Weight as of this encounter: 92.5 kg (204 lb).  BSA:   Estimated body surface area is 1.95 meters squared as calculated from the following:    Height as of this encounter: 5' 3\" (1.6 m).    Weight as of this encounter: 92.5 kg (204 lb).         Procedures  None today       "

## 2024-02-19 LAB
DME PARACHUTE DELIVERY DATE ACTUAL: NORMAL
DME PARACHUTE DELIVERY DATE REQUESTED: NORMAL
DME PARACHUTE ITEM DESCRIPTION: NORMAL
DME PARACHUTE ORDER STATUS: NORMAL
DME PARACHUTE SUPPLIER NAME: NORMAL
DME PARACHUTE SUPPLIER PHONE: NORMAL

## 2024-02-26 DIAGNOSIS — R11.0 NAUSEA: ICD-10-CM

## 2024-02-26 DIAGNOSIS — G43.909 MIGRAINE WITHOUT STATUS MIGRAINOSUS, NOT INTRACTABLE, UNSPECIFIED MIGRAINE TYPE: ICD-10-CM

## 2024-02-26 RX ORDER — VENLAFAXINE HYDROCHLORIDE 150 MG/1
150 CAPSULE, EXTENDED RELEASE ORAL
Qty: 90 CAPSULE | Refills: 0 | Status: SHIPPED | OUTPATIENT
Start: 2024-02-26

## 2024-02-26 RX ORDER — ONDANSETRON 4 MG/1
4 TABLET, FILM COATED ORAL EVERY 8 HOURS PRN
Qty: 20 TABLET | Refills: 0 | Status: SHIPPED | OUTPATIENT
Start: 2024-02-26

## 2024-02-28 ENCOUNTER — ANESTHESIA (OUTPATIENT)
Age: 39
End: 2024-02-28

## 2024-02-28 ENCOUNTER — EVALUATION (OUTPATIENT)
Dept: PHYSICAL THERAPY | Facility: CLINIC | Age: 39
End: 2024-02-28
Payer: COMMERCIAL

## 2024-02-28 ENCOUNTER — ANESTHESIA EVENT (OUTPATIENT)
Age: 39
End: 2024-02-28

## 2024-02-28 DIAGNOSIS — M17.11 PRIMARY OSTEOARTHRITIS OF RIGHT KNEE: ICD-10-CM

## 2024-02-28 DIAGNOSIS — M25.561 ACUTE PAIN OF RIGHT KNEE: Primary | ICD-10-CM

## 2024-02-28 DIAGNOSIS — S83.231A COMPLEX TEAR OF MEDIAL MENISCUS OF RIGHT KNEE, UNSPECIFIED WHETHER OLD OR CURRENT TEAR, INITIAL ENCOUNTER: ICD-10-CM

## 2024-02-28 PROCEDURE — 97161 PT EVAL LOW COMPLEX 20 MIN: CPT | Performed by: PHYSICAL THERAPIST

## 2024-02-28 NOTE — PROGRESS NOTES
PT Evaluation     Today's date: 2024  Patient name: Kaitlynn Madera  : 1985  MRN: [id number]  Referring provider: Emi García PA-C  Dx:   Encounter Diagnosis     ICD-10-CM    1. Acute pain of right knee  M25.561       2. Complex tear of medial meniscus of right knee, unspecified whether old or current tear, initial encounter  S83.231A Ambulatory Referral to Physical Therapy      3. Primary osteoarthritis of right knee  M17.11 Ambulatory referral to Physical Therapy                     Assessment  Assessment details: Pt is a 39 y.o. female who presents to outpatient pre-op PT with pain, decreased rom, decreased strength and decreased functional mobility. She will benefit from skilled PT to address these deficits in order to achieve her goals and maximize her functional mobility. She has been provided with an initial hep and she is scheduled for her first post-op visit.            Goals  Short Term Goals:   Independent performance of initial hep  Decrease pain 2 points on VAS      Long Term Goals:  Independent performance of comprehensive hep  Work performance is returned to max level of function  Performance of IADL's is returned to max level of function  Performance in recreational activities is improved to max level of function  Able to walk community distance with no AD  Able to climb stairs with reciprocal gait pattern and no rail.      Plan  Planned therapy interventions: manual therapy, massage, strengthening, stretching, therapeutic activities, therapeutic exercise, therapeutic training, transfer training, gait training, home exercise program and IADL retraining  Frequency: 2x week  Duration in weeks: 8        Subjective Evaluation    History of Present Illness  Mechanism of injury: 3/7/24 pt is scheduled to undergo TKA Vs unicompartmental.  Reports that she has been having B/L knee pain for approach 1 year. She plans to have the contralateral side done after she recovers. Reports that she  currently has pain with walking extended periods of time and with stair climbing, bedroom is on the 2nd floor. Denies instability. Reports that she has a walker and SPC cane at home. She would like to return to playing with her children and an otherwise active lifestyle.   Patient Goals  Patient goals for therapy: decreased edema, decreased pain, increased motion, increased strength, independence with ADLs/IADLs and return to sport/leisure activities    Pain  Current pain ratin                Objective        R knee      ROM   Flexion: 114   Extension: -7      Strength:   Flexion:  4-/5   Extension: 4- /5      Crepitus noted with arom  Diminished quad set R compared to L             Precautions: R (TKA vs unicompartmental), L knee OA      Manuals             prom                                                    Neuro Re-Ed                                                                                                        Ther Ex             Heel slides             Prone TKE             saq             Slr flexion             Cone taps             Step ups             Mini squats                          Ther Activity             bike                          Gait Training                                       Modalities             Cp with extension hang

## 2024-03-06 ENCOUNTER — ANESTHESIA EVENT (OUTPATIENT)
Age: 39
End: 2024-03-06
Payer: COMMERCIAL

## 2024-03-06 NOTE — ANESTHESIA PREPROCEDURE EVALUATION
Procedure:  ARTHROPLASTY KNEE UNICOMPARTMENTAL vs TKA- SAME DAY (Right: Knee)     - denies any chest pain, palpitations, shortness of breath, syncope, lightheadedness, seizures   - denies any recent infectious symptoms such as fevers, chills, cough   - denies taking any anticoagulation medications or any issues with bleeding, bruising, clotting    EKG (02/12/24):  NSR, HR 81bpm, Qtc 462    Relevant Problems   ANESTHESIA   (+) Motion sickness      CARDIO   (+) Migraines   (+) Mixed hyperlipidemia      ENDO (within normal limits)      GI/HEPATIC (within normal limits)      /RENAL (within normal limits)      GYN (within normal limits)      HEMATOLOGY (within normal limits)      MUSCULOSKELETAL   (+) Primary osteoarthritis of right knee      NEURO/PSYCH   (+) Depression with anxiety   (+) Migraines      PULMONARY (within normal limits)      Nervous and Auditory   (+) Radiculitis of right cervical region      Lab Results   Component Value Date    WBC 7.17 02/12/2024    HGB 14.5 02/12/2024    HCT 44.5 02/12/2024    MCV 90 02/12/2024     02/12/2024     Lab Results   Component Value Date    SODIUM 138 02/12/2024    K 4.5 02/12/2024     02/12/2024    CO2 30 02/12/2024    AGAP 7 02/12/2024    BUN 15 02/12/2024    CREATININE 0.85 02/12/2024    GLUC 89 04/06/2018    GLUF 65 02/12/2024    CALCIUM 9.9 02/12/2024    AST 14 02/12/2024    ALT 14 02/12/2024    ALKPHOS 40 02/12/2024    TP 7.7 02/12/2024    TBILI 0.43 02/12/2024    EGFR 85 02/12/2024     Lab Results   Component Value Date    PTT 26 02/12/2024     Lab Results   Component Value Date    INR 0.93 02/12/2024    PROTIME 12.8 02/12/2024       Physical Exam    Airway    Mallampati score: II  TM Distance: >3 FB  Neck ROM: full     Dental   No notable dental hx     Cardiovascular  Rhythm: regular, Rate: normal, Cardiovascular exam normal    Pulmonary  Pulmonary exam normal Breath sounds clear to auscultation    Other Findings  post-pubertal.      Anesthesia  Plan  ASA Score- 2     Anesthesia Type- spinal with ASA Monitors.         Additional Monitors:     Airway Plan:     Comment: Spinal with peripheral nerve blocks.       Plan Factors-Exercise tolerance (METS): >4 METS.    Chart reviewed. EKG reviewed. Imaging results reviewed. Existing labs reviewed. Patient summary reviewed.    Patient is not a current smoker.  Patient did not smoke on day of surgery.    Obstructive sleep apnea risk education given perioperatively.        Induction- intravenous.    Postoperative Plan- Plan for postoperative opioid use.     Informed Consent- Anesthetic plan and risks discussed with patient.  I personally reviewed this patient with the CRNA. Discussed and agreed on the Anesthesia Plan with the CRNA..

## 2024-03-07 ENCOUNTER — HOSPITAL ENCOUNTER (OUTPATIENT)
Age: 39
Setting detail: OUTPATIENT SURGERY
Discharge: HOME | End: 2024-03-08
Attending: STUDENT IN AN ORGANIZED HEALTH CARE EDUCATION/TRAINING PROGRAM | Admitting: STUDENT IN AN ORGANIZED HEALTH CARE EDUCATION/TRAINING PROGRAM
Payer: COMMERCIAL

## 2024-03-07 ENCOUNTER — APPOINTMENT (OUTPATIENT)
Age: 39
End: 2024-03-07
Payer: COMMERCIAL

## 2024-03-07 ENCOUNTER — ANESTHESIA (OUTPATIENT)
Age: 39
End: 2024-03-07
Payer: COMMERCIAL

## 2024-03-07 DIAGNOSIS — M17.11 PRIMARY OSTEOARTHRITIS OF RIGHT KNEE: Primary | ICD-10-CM

## 2024-03-07 LAB
EXT PREGNANCY TEST URINE: NEGATIVE
EXT. CONTROL: NORMAL

## 2024-03-07 PROCEDURE — 81025 URINE PREGNANCY TEST: CPT | Performed by: STUDENT IN AN ORGANIZED HEALTH CARE EDUCATION/TRAINING PROGRAM

## 2024-03-07 PROCEDURE — C1776 JOINT DEVICE (IMPLANTABLE): HCPCS | Performed by: STUDENT IN AN ORGANIZED HEALTH CARE EDUCATION/TRAINING PROGRAM

## 2024-03-07 PROCEDURE — 27446 REVISION OF KNEE JOINT: CPT | Performed by: PHYSICIAN ASSISTANT

## 2024-03-07 PROCEDURE — 99024 POSTOP FOLLOW-UP VISIT: CPT | Performed by: PHYSICIAN ASSISTANT

## 2024-03-07 PROCEDURE — C9290 INJ, BUPIVACAINE LIPOSOME: HCPCS | Performed by: ANESTHESIOLOGY

## 2024-03-07 PROCEDURE — C1713 ANCHOR/SCREW BN/BN,TIS/BN: HCPCS | Performed by: STUDENT IN AN ORGANIZED HEALTH CARE EDUCATION/TRAINING PROGRAM

## 2024-03-07 PROCEDURE — 27446 REVISION OF KNEE JOINT: CPT | Performed by: STUDENT IN AN ORGANIZED HEALTH CARE EDUCATION/TRAINING PROGRAM

## 2024-03-07 PROCEDURE — 73560 X-RAY EXAM OF KNEE 1 OR 2: CPT

## 2024-03-07 DEVICE — TWIN PEG FEMORAL
Type: IMPLANTABLE DEVICE | Site: KNEE | Status: FUNCTIONAL
Brand: OXFORD PARTIAL KNEE SYSTEM

## 2024-03-07 DEVICE — ANATOMIC MENISCAL BEARINGRIGHT MEDIAL
Type: IMPLANTABLE DEVICE | Site: KNEE | Status: FUNCTIONAL
Brand: OXFORD PARTIAL KNEE SYSTEM

## 2024-03-07 DEVICE — IMPLANTABLE DEVICE
Type: IMPLANTABLE DEVICE | Site: KNEE | Status: FUNCTIONAL
Brand: OXFORD PARTIAL KNEE SYSTEM

## 2024-03-07 DEVICE — SMARTSET HIGH PERFORMANCE MV MEDIUM VISCOSITY BONE CEMENT 40G
Type: IMPLANTABLE DEVICE | Site: KNEE | Status: FUNCTIONAL
Brand: SMARTSET

## 2024-03-07 RX ORDER — OXYCODONE HYDROCHLORIDE 10 MG/1
10 TABLET ORAL EVERY 4 HOURS PRN
Status: DISCONTINUED | OUTPATIENT
Start: 2024-03-07 | End: 2024-03-08 | Stop reason: HOSPADM

## 2024-03-07 RX ORDER — HYDRALAZINE HYDROCHLORIDE 20 MG/ML
5 INJECTION INTRAMUSCULAR; INTRAVENOUS EVERY 4 HOURS PRN
Status: DISCONTINUED | OUTPATIENT
Start: 2024-03-07 | End: 2024-03-08 | Stop reason: HOSPADM

## 2024-03-07 RX ORDER — TRANEXAMIC ACID 10 MG/ML
1000 INJECTION, SOLUTION INTRAVENOUS ONCE
Status: COMPLETED | OUTPATIENT
Start: 2024-03-07 | End: 2024-03-07

## 2024-03-07 RX ORDER — ONDANSETRON 2 MG/ML
INJECTION INTRAMUSCULAR; INTRAVENOUS AS NEEDED
Status: DISCONTINUED | OUTPATIENT
Start: 2024-03-07 | End: 2024-03-07

## 2024-03-07 RX ORDER — CHLORHEXIDINE GLUCONATE 4 G/100ML
SOLUTION TOPICAL DAILY PRN
Status: DISCONTINUED | OUTPATIENT
Start: 2024-03-07 | End: 2024-03-07 | Stop reason: HOSPADM

## 2024-03-07 RX ORDER — ACETAMINOPHEN 325 MG/1
975 TABLET ORAL ONCE
Status: COMPLETED | OUTPATIENT
Start: 2024-03-07 | End: 2024-03-07

## 2024-03-07 RX ORDER — OXYCODONE HYDROCHLORIDE 5 MG/1
5 TABLET ORAL EVERY 4 HOURS PRN
Qty: 42 TABLET | Refills: 0 | Status: SHIPPED | OUTPATIENT
Start: 2024-03-07 | End: 2024-03-17

## 2024-03-07 RX ORDER — FENTANYL CITRATE 50 UG/ML
INJECTION, SOLUTION INTRAMUSCULAR; INTRAVENOUS AS NEEDED
Status: DISCONTINUED | OUTPATIENT
Start: 2024-03-07 | End: 2024-03-07

## 2024-03-07 RX ORDER — ACETAMINOPHEN 325 MG/1
975 TABLET ORAL EVERY 8 HOURS
Status: DISCONTINUED | OUTPATIENT
Start: 2024-03-07 | End: 2024-03-08 | Stop reason: HOSPADM

## 2024-03-07 RX ORDER — DIPHENHYDRAMINE HYDROCHLORIDE 50 MG/ML
12.5 INJECTION INTRAMUSCULAR; INTRAVENOUS ONCE AS NEEDED
Status: DISCONTINUED | OUTPATIENT
Start: 2024-03-07 | End: 2024-03-07 | Stop reason: HOSPADM

## 2024-03-07 RX ORDER — BUPIVACAINE HYDROCHLORIDE 5 MG/ML
INJECTION, SOLUTION EPIDURAL; INTRACAUDAL
Status: COMPLETED | OUTPATIENT
Start: 2024-03-07 | End: 2024-03-07

## 2024-03-07 RX ORDER — SENNOSIDES 8.6 MG
1 TABLET ORAL DAILY
Status: DISCONTINUED | OUTPATIENT
Start: 2024-03-08 | End: 2024-03-08 | Stop reason: HOSPADM

## 2024-03-07 RX ORDER — SIMETHICONE 80 MG
80 TABLET,CHEWABLE ORAL 4 TIMES DAILY PRN
Status: DISCONTINUED | OUTPATIENT
Start: 2024-03-07 | End: 2024-03-08 | Stop reason: HOSPADM

## 2024-03-07 RX ORDER — MIDAZOLAM HYDROCHLORIDE 2 MG/2ML
INJECTION, SOLUTION INTRAMUSCULAR; INTRAVENOUS
Status: COMPLETED | OUTPATIENT
Start: 2024-03-07 | End: 2024-03-07

## 2024-03-07 RX ORDER — CEFADROXIL 500 MG/1
500 CAPSULE ORAL EVERY 12 HOURS SCHEDULED
Qty: 10 CAPSULE | Refills: 0 | Status: SHIPPED | OUTPATIENT
Start: 2024-03-07 | End: 2024-03-12

## 2024-03-07 RX ORDER — HYDROMORPHONE HCL/PF 1 MG/ML
0.5 SYRINGE (ML) INJECTION
Status: DISCONTINUED | OUTPATIENT
Start: 2024-03-07 | End: 2024-03-07 | Stop reason: HOSPADM

## 2024-03-07 RX ORDER — SODIUM CHLORIDE, SODIUM LACTATE, POTASSIUM CHLORIDE, CALCIUM CHLORIDE 600; 310; 30; 20 MG/100ML; MG/100ML; MG/100ML; MG/100ML
125 INJECTION, SOLUTION INTRAVENOUS CONTINUOUS
Status: DISCONTINUED | OUTPATIENT
Start: 2024-03-07 | End: 2024-03-08 | Stop reason: HOSPADM

## 2024-03-07 RX ORDER — PROPOFOL 10 MG/ML
INJECTION, EMULSION INTRAVENOUS CONTINUOUS PRN
Status: DISCONTINUED | OUTPATIENT
Start: 2024-03-07 | End: 2024-03-07

## 2024-03-07 RX ORDER — BUPIVACAINE HYDROCHLORIDE 2.5 MG/ML
INJECTION, SOLUTION EPIDURAL; INFILTRATION; INTRACAUDAL AS NEEDED
Status: DISCONTINUED | OUTPATIENT
Start: 2024-03-07 | End: 2024-03-07 | Stop reason: HOSPADM

## 2024-03-07 RX ORDER — SCOLOPAMINE TRANSDERMAL SYSTEM 1 MG/1
1 PATCH, EXTENDED RELEASE TRANSDERMAL
Status: DISCONTINUED | OUTPATIENT
Start: 2024-03-07 | End: 2024-03-07 | Stop reason: HOSPADM

## 2024-03-07 RX ORDER — DEXAMETHASONE SODIUM PHOSPHATE 10 MG/ML
INJECTION, SOLUTION INTRAMUSCULAR; INTRAVENOUS AS NEEDED
Status: DISCONTINUED | OUTPATIENT
Start: 2024-03-07 | End: 2024-03-07 | Stop reason: HOSPADM

## 2024-03-07 RX ORDER — AMOXICILLIN 250 MG
1 CAPSULE ORAL DAILY
Qty: 30 TABLET | Refills: 0 | Status: SHIPPED | OUTPATIENT
Start: 2024-03-07

## 2024-03-07 RX ORDER — OXYCODONE HYDROCHLORIDE 5 MG/1
5 TABLET ORAL EVERY 4 HOURS PRN
Status: DISCONTINUED | OUTPATIENT
Start: 2024-03-07 | End: 2024-03-08 | Stop reason: HOSPADM

## 2024-03-07 RX ORDER — FOLIC ACID 1 MG/1
1 TABLET ORAL DAILY
Status: DISCONTINUED | OUTPATIENT
Start: 2024-03-08 | End: 2024-03-08 | Stop reason: HOSPADM

## 2024-03-07 RX ORDER — ACETAMINOPHEN 325 MG/1
650 TABLET ORAL EVERY 4 HOURS PRN
Status: DISCONTINUED | OUTPATIENT
Start: 2024-03-07 | End: 2024-03-08 | Stop reason: HOSPADM

## 2024-03-07 RX ORDER — ASCORBIC ACID 500 MG
500 TABLET ORAL 2 TIMES DAILY
Status: DISCONTINUED | OUTPATIENT
Start: 2024-03-07 | End: 2024-03-08 | Stop reason: HOSPADM

## 2024-03-07 RX ORDER — CEFAZOLIN SODIUM 2 G/50ML
2000 SOLUTION INTRAVENOUS EVERY 8 HOURS
Qty: 100 ML | Refills: 0 | Status: COMPLETED | OUTPATIENT
Start: 2024-03-07 | End: 2024-03-08

## 2024-03-07 RX ORDER — CHLORHEXIDINE GLUCONATE ORAL RINSE 1.2 MG/ML
15 SOLUTION DENTAL ONCE
Status: COMPLETED | OUTPATIENT
Start: 2024-03-07 | End: 2024-03-07

## 2024-03-07 RX ORDER — PANTOPRAZOLE SODIUM 40 MG/1
40 TABLET, DELAYED RELEASE ORAL
Status: DISCONTINUED | OUTPATIENT
Start: 2024-03-08 | End: 2024-03-08 | Stop reason: HOSPADM

## 2024-03-07 RX ORDER — KETOROLAC TROMETHAMINE 30 MG/ML
INJECTION, SOLUTION INTRAMUSCULAR; INTRAVENOUS AS NEEDED
Status: DISCONTINUED | OUTPATIENT
Start: 2024-03-07 | End: 2024-03-07 | Stop reason: HOSPADM

## 2024-03-07 RX ORDER — FENTANYL CITRATE/PF 50 MCG/ML
50 SYRINGE (ML) INJECTION
Status: DISCONTINUED | OUTPATIENT
Start: 2024-03-07 | End: 2024-03-07 | Stop reason: HOSPADM

## 2024-03-07 RX ORDER — DEXAMETHASONE SODIUM PHOSPHATE 10 MG/ML
INJECTION, SOLUTION INTRAMUSCULAR; INTRAVENOUS AS NEEDED
Status: DISCONTINUED | OUTPATIENT
Start: 2024-03-07 | End: 2024-03-07

## 2024-03-07 RX ORDER — CEFAZOLIN SODIUM 2 G/50ML
2000 SOLUTION INTRAVENOUS ONCE
Status: COMPLETED | OUTPATIENT
Start: 2024-03-07 | End: 2024-03-07

## 2024-03-07 RX ORDER — SODIUM CHLORIDE, SODIUM LACTATE, POTASSIUM CHLORIDE, CALCIUM CHLORIDE 600; 310; 30; 20 MG/100ML; MG/100ML; MG/100ML; MG/100ML
100 INJECTION, SOLUTION INTRAVENOUS CONTINUOUS
Status: DISCONTINUED | OUTPATIENT
Start: 2024-03-07 | End: 2024-03-08 | Stop reason: HOSPADM

## 2024-03-07 RX ORDER — HYDROMORPHONE HCL IN WATER/PF 6 MG/30 ML
0.2 PATIENT CONTROLLED ANALGESIA SYRINGE INTRAVENOUS
Status: DISCONTINUED | OUTPATIENT
Start: 2024-03-07 | End: 2024-03-07 | Stop reason: HOSPADM

## 2024-03-07 RX ORDER — METOCLOPRAMIDE HYDROCHLORIDE 5 MG/ML
10 INJECTION INTRAMUSCULAR; INTRAVENOUS ONCE AS NEEDED
Status: DISCONTINUED | OUTPATIENT
Start: 2024-03-07 | End: 2024-03-07 | Stop reason: HOSPADM

## 2024-03-07 RX ORDER — MAGNESIUM HYDROXIDE 1200 MG/15ML
LIQUID ORAL AS NEEDED
Status: DISCONTINUED | OUTPATIENT
Start: 2024-03-07 | End: 2024-03-07 | Stop reason: HOSPADM

## 2024-03-07 RX ORDER — SCOLOPAMINE TRANSDERMAL SYSTEM 1 MG/1
1 PATCH, EXTENDED RELEASE TRANSDERMAL ONCE
Status: DISCONTINUED | OUTPATIENT
Start: 2024-03-07 | End: 2024-03-08 | Stop reason: HOSPADM

## 2024-03-07 RX ORDER — DOCUSATE SODIUM 100 MG/1
100 CAPSULE, LIQUID FILLED ORAL 2 TIMES DAILY
Status: DISCONTINUED | OUTPATIENT
Start: 2024-03-07 | End: 2024-03-08 | Stop reason: HOSPADM

## 2024-03-07 RX ORDER — ONDANSETRON 2 MG/ML
4 INJECTION INTRAMUSCULAR; INTRAVENOUS ONCE AS NEEDED
Status: DISCONTINUED | OUTPATIENT
Start: 2024-03-07 | End: 2024-03-07 | Stop reason: HOSPADM

## 2024-03-07 RX ORDER — GABAPENTIN 300 MG/1
300 CAPSULE ORAL
Status: DISCONTINUED | OUTPATIENT
Start: 2024-03-07 | End: 2024-03-08 | Stop reason: HOSPADM

## 2024-03-07 RX ORDER — ACETAMINOPHEN 500 MG
1000 TABLET ORAL EVERY 8 HOURS
Qty: 60 TABLET | Refills: 0 | Status: SHIPPED | OUTPATIENT
Start: 2024-03-07

## 2024-03-07 RX ORDER — LIDOCAINE HYDROCHLORIDE 10 MG/ML
0.5 INJECTION, SOLUTION EPIDURAL; INFILTRATION; INTRACAUDAL; PERINEURAL ONCE AS NEEDED
Status: DISCONTINUED | OUTPATIENT
Start: 2024-03-07 | End: 2024-03-07 | Stop reason: HOSPADM

## 2024-03-07 RX ORDER — ONDANSETRON 2 MG/ML
4 INJECTION INTRAMUSCULAR; INTRAVENOUS EVERY 6 HOURS PRN
Status: DISCONTINUED | OUTPATIENT
Start: 2024-03-07 | End: 2024-03-08 | Stop reason: HOSPADM

## 2024-03-07 RX ORDER — ONDANSETRON 4 MG/1
4 TABLET, ORALLY DISINTEGRATING ORAL EVERY 6 HOURS PRN
Qty: 20 TABLET | Refills: 0 | Status: SHIPPED | OUTPATIENT
Start: 2024-03-07

## 2024-03-07 RX ORDER — CALCIUM CARBONATE 500 MG/1
1000 TABLET, CHEWABLE ORAL DAILY PRN
Status: DISCONTINUED | OUTPATIENT
Start: 2024-03-07 | End: 2024-03-08 | Stop reason: HOSPADM

## 2024-03-07 RX ADMIN — SODIUM CHLORIDE, SODIUM LACTATE, POTASSIUM CHLORIDE, AND CALCIUM CHLORIDE 100 ML/HR: .6; .31; .03; .02 INJECTION, SOLUTION INTRAVENOUS at 21:02

## 2024-03-07 RX ADMIN — GABAPENTIN 300 MG: 300 CAPSULE ORAL at 22:06

## 2024-03-07 RX ADMIN — CHLORHEXIDINE GLUCONATE 0.12% ORAL RINSE 15 ML: 1.2 LIQUID ORAL at 14:40

## 2024-03-07 RX ADMIN — BUPIVACAINE 20 ML: 13.3 INJECTION, SUSPENSION, LIPOSOMAL INFILTRATION at 14:58

## 2024-03-07 RX ADMIN — DOCUSATE SODIUM 100 MG: 100 CAPSULE, LIQUID FILLED ORAL at 18:35

## 2024-03-07 RX ADMIN — ONDANSETRON 4 MG: 2 INJECTION INTRAMUSCULAR; INTRAVENOUS at 15:34

## 2024-03-07 RX ADMIN — ACETAMINOPHEN 325MG 975 MG: 325 TABLET ORAL at 18:35

## 2024-03-07 RX ADMIN — SCOPALAMINE 1 PATCH: 1 PATCH, EXTENDED RELEASE TRANSDERMAL at 14:43

## 2024-03-07 RX ADMIN — PROPOFOL 150 MCG/KG/MIN: 10 INJECTION, EMULSION INTRAVENOUS at 15:26

## 2024-03-07 RX ADMIN — HYDRALAZINE HYDROCHLORIDE 5 MG: 20 INJECTION INTRAMUSCULAR; INTRAVENOUS at 18:35

## 2024-03-07 RX ADMIN — FENTANYL CITRATE 50 MCG: 50 INJECTION INTRAMUSCULAR; INTRAVENOUS at 15:17

## 2024-03-07 RX ADMIN — SODIUM CHLORIDE, SODIUM LACTATE, POTASSIUM CHLORIDE, AND CALCIUM CHLORIDE 125 ML/HR: .6; .31; .03; .02 INJECTION, SOLUTION INTRAVENOUS at 14:41

## 2024-03-07 RX ADMIN — CEFAZOLIN SODIUM 2000 MG: 2 SOLUTION INTRAVENOUS at 15:15

## 2024-03-07 RX ADMIN — TRANEXAMIC ACID 1000 MG: 10 INJECTION, SOLUTION INTRAVENOUS at 15:25

## 2024-03-07 RX ADMIN — BUPIVACAINE HYDROCHLORIDE 10 ML: 5 INJECTION, SOLUTION EPIDURAL; INTRACAUDAL; PERINEURAL at 14:58

## 2024-03-07 RX ADMIN — FENTANYL CITRATE 25 MCG: 50 INJECTION INTRAMUSCULAR; INTRAVENOUS at 15:30

## 2024-03-07 RX ADMIN — OXYCODONE HYDROCHLORIDE 5 MG: 5 TABLET ORAL at 22:06

## 2024-03-07 RX ADMIN — OXYCODONE HYDROCHLORIDE AND ACETAMINOPHEN 500 MG: 500 TABLET ORAL at 18:35

## 2024-03-07 RX ADMIN — ASPIRIN 81 MG: 81 TABLET, COATED ORAL at 20:35

## 2024-03-07 RX ADMIN — SODIUM CHLORIDE, SODIUM LACTATE, POTASSIUM CHLORIDE, AND CALCIUM CHLORIDE 1000 ML: .6; .31; .03; .02 INJECTION, SOLUTION INTRAVENOUS at 20:00

## 2024-03-07 RX ADMIN — ACETAMINOPHEN 325MG 975 MG: 325 TABLET ORAL at 14:40

## 2024-03-07 RX ADMIN — SODIUM CHLORIDE, SODIUM LACTATE, POTASSIUM CHLORIDE, AND CALCIUM CHLORIDE 100 ML/HR: .6; .31; .03; .02 INJECTION, SOLUTION INTRAVENOUS at 18:25

## 2024-03-07 RX ADMIN — MIDAZOLAM 2 MG: 1 INJECTION INTRAMUSCULAR; INTRAVENOUS at 14:56

## 2024-03-07 RX ADMIN — DEXAMETHASONE SODIUM PHOSPHATE 10 MG: 10 INJECTION INTRAMUSCULAR; INTRAVENOUS at 16:11

## 2024-03-07 RX ADMIN — BUPIVACAINE HYDROCHLORIDE 30 ML: 5 INJECTION, SOLUTION EPIDURAL; INTRACAUDAL at 15:00

## 2024-03-07 RX ADMIN — FENTANYL CITRATE 25 MCG: 50 INJECTION INTRAMUSCULAR; INTRAVENOUS at 15:50

## 2024-03-07 NOTE — INTERVAL H&P NOTE
H&P reviewed. After examining the patient I find no changes in the patients condition since the H&P had been written. Plan for right medial UKA.

## 2024-03-07 NOTE — ANESTHESIA PROCEDURE NOTES
Peripheral Block    Patient location during procedure: holding area  Start time: 3/7/2024 3:00 PM  Reason for block: at surgeon's request and post-op pain management  Staffing  Performed by: Davis Corea MD  Authorized by: Davis Corea MD    Preanesthetic Checklist  Completed: patient identified, IV checked, site marked, risks and benefits discussed, surgical consent, monitors and equipment checked, pre-op evaluation and timeout performed  Peripheral Block  Patient position: left lateral  Prep: ChloraPrep  Patient monitoring: frequent blood pressure checks, continuous pulse oximetry and heart rate  Block type: IPACK  Laterality: right  Injection technique: single-shot  Procedures: ultrasound guided, Ultrasound guidance required for the procedure to increase accuracy and safety of medication placement and decrease risk of complications.  Ultrasound permanent image savedbupivacaine (PF) (MARCAINE) 0.5 % injection 20 mL - Perineural   30 mL - 3/7/2024 3:00:00 PM  Needle  Needle type: Stimuplex   Needle localization: anatomical landmarks and ultrasound guidance  Assessment  Injection assessment: incremental injection, frequent aspiration, injected with ease, negative aspiration, negative for heart rate change, no paresthesia on injection, no symptoms of intraneural/intravenous injection and needle tip visualized at all times  Paresthesia pain: none  Post-procedure:  site cleaned  patient tolerated the procedure well with no immediate complications

## 2024-03-07 NOTE — ANESTHESIA POSTPROCEDURE EVALUATION
Post-Op Assessment Note    CV Status:  Stable  Pain Score: 0    Pain management: adequate       Mental Status:  Alert and awake   Hydration Status:  Euvolemic   PONV Controlled:  Controlled   Airway Patency:  Patent     Post Op Vitals Reviewed: Yes    No anethesia notable event occurred.    Staff: Anesthesiologist, with CRNAs               BP   128/66   Temp   97.7   Pulse  72   Resp   18   SpO2   96

## 2024-03-07 NOTE — OP NOTE
OPERATIVE REPORT  PATIENT NAME: Kaitlynn Madera  : 1985  MRN: [id number]  Pt Location:  [facility] OR ROOM 06    Surgery Date: 3/7/2024    Surgeons and Role:     * León Gray, DO - Primary     * Emi García PA-C      * Mirna Brennan AT-C     Preop Diagnosis:  Primary osteoarthritis of right knee [M17.11]    Post-Op Diagnosis Codes:     * Primary osteoarthritis of right knee [M17.11]    Procedure(s):  Right - ARTHROPLASTY KNEE UNICOMPARTMENTAL    Specimens:  * No specimens in log *    Estimated Blood Loss:   50 mL    Drains:  * No LDAs found *    Anesthesia Type:   Spinal      Operative Indications:  Primary osteoarthritis of right knee [M17.11]  39F with right knee pain 2/2 severe osteoarthritis localized to the medial compartment. She has failed extensive conservative management with HEP, PT, IA CSI, durolane and weight-loss. She continues with symptoms of instability. She has lost 50 lbs over the past year which is remarkable but has not alleviated her symptoms. MRI demonstrates insolated anteromedial OA in the medial compartment with intact ACL. The patient has elected to proceed with right medial UKA vs TKA. Risks and benefits of surgery to include but not limited to bleeding, infection, damage to surrounding structures, hardware failure, instability, fracture, dislocation, need for further surgery, continued pain, stiffness, blood clots, stroke, and heart attack was discussed with the patient.      Operative Findings:  Grade IV changes MFC  Grade IV changes anteromedial tibia  No discernable wear in the patellofemoral or lateral compartments  Intact lateral meniscus  Intact ACL/PCL    Implant Name Type Inv. Item Serial No.  Lot No. LRB No. Used Action   COMPONENT FEMORAL CMNT TWN PEG SM - [id number]  COMPONENT FEMORAL CMNT TWN PEG SM  [employer] [id number] Right 1 Implanted   BIOMET Exablox PARTIAL KNEE SYSTEM   [id number]  [id number] Right 1 Implanted   CEMENT BONE SMART SET GRAY MED VISC -  [id number]  CEMENT BONE SMART SET GRAY MED VISC  DEPUY [id number] Right 1 Implanted   INSERT TIBIAL BRNG SM SZ 3 RT [place] - [id number]  INSERT TIBIAL BRNG SM SZ 3 RT [place]  [employer] [id number] Right 1 Implanted     Complications:   None    Knee Technique: Suture (direct) Repair  Knee Approach: Medial Parapatellar, mini    Procedure and Technique:  Patient was seen in the preoperative holding area.  Informed consent was confirmed and all questions were answered. Operative site was confirmed and marked. Patient was taken to the operating room and transferred to the operating room table. Anesthesia was performed. The patient was then placed supine and all bony prominences were well-padded. Right lower extremity was prepped and draped in usual sterile fashion with chlorhexidine scrub.  Patient was given perioperative antibiotics prior to incision and SCDs were placed on the non-operative leg.  A formal time-out was performed identifying the patient and confirmed operative site.  The knee was exsanguinated and a pneumatic tourniquet was inflated at 250 mmHg.  A slightly medial midline incision was performed from 1 fingerbreadth above the patella to the tibial tubercle.  This incision was carried down through skin and subcutaneous tissues to the level of the extensor mechanism.  A small medial skin flap was created.  A mini mid-vastus approach was performed into the knee being careful to avoid the patellar tendon and intact cartilage. The anterior horn of the medial meniscus was released.  The medial peel was performed to the mid coronal line. At this time the lateral and patellofemoral joints were carefully inspected for wear and found to have no significant wear precluding unicompartmental arthroplasty. Peripheral osteophytes were removed at this time from the medial compartment. The femur was sized to a small per the patient's demographics and intraoperative measurement. The extramedullary tibial guide was placed  perpendicular to the mechanical axis and with appropriate slope. The size 3 G clamp was used to secure the cutting guide. Retractors were placed to protect the ACL and MCL. The tibia was cut with sagittal saw being careful to avoid the ACL and reciprocating saw being careful to avoid the MCL. The tibial cut was loosened with a straight osteotome and removed. The tibial was inspected and confirmed to have isolated anteromedial wear with intact posterior cartilage. The tibia was sized to an AA with no posterior or medial overhang. At this time the femoral canal drill was used followed by opening awl and IM andrea. The AP axis of the C was marked with a surgical marker. The small size femoral cutting guide was placed and attached to the IM andrea. The alignment was confirmed to be along the AP axis. The proximal and distal holes were drilled. The guide was removed and the size zero spiguet was placed. The distal femur was reamed. The trial was placed. The flexion gap was appropriate with a size 3. The extension gap was then assessed and found to be a 0. At this time the 2 followed by 3 spiguet was placed and the distal femur was reamed again. The nibler was then used to remove peripheral bone. The flexion and extension gaps were then assessed and found to be symmetric with a size 3 insert. At this time the final femoral prep with the rhino reamer and posterior osteotome was completed. At this time the tibia was prepared the appropriate size baseplate was pinned in place. The toothbrush blade was used to cut the trough. This was then cleaned of any residual bone. At this time the knee was again trialed with the above implants and found to have excellent stability with impingement of the bearing along the lateral tibial tray, no bearing spit-out and appropriate alignment. At this time all trials were removed and the knee was copiously irrigated. The distal femur was prepped with the small finishing drill for cement  interdigitation. The bone surfaces were dried. The tibial was cemented in place extruding the cement anteriorly. The tibia was then cleared of all excess cement. The femur was then cemented into place. The was then held at 45 degrees with axial pressure with the appropriate sized spacer block in place until cement was cured. The knee was then trialed with the above bearing and found to have excellent stability. The real size 3 bearing was implanted and the knee was taken through ROM. The tourniquet was released at 35 minutes and all bleeders were coagulated.  The knee was irrigated with the remainder of the 3 L of normal saline solution.  The joint local was injected in the posterior capsule and around the tissues of the knee.  The knee was taken through a final range of motion and found to have excellent stability and patellar tracking.  All instrument and sponge counts were correct x2.  The arthrotomy was closed with #1 Stratafix suture.  Subcutaneous tissues were closed with 2-0 Vicryl.  The skin was closed with 3-0 Stratafix followed by Dermabond.  A sterile Mepilex was placed along with a thigh foot Ace wrap.  Patient was awoken from anesthesia and taken to recovery room in stable condition.     I was present for the entire procedure., A qualified resident physician was not available., and A physician assistant was required during the procedure for retraction, tissue handling, dissection and suturing.    Patient Disposition:  PACU     39F s/p right medial UKA 3/7  - multi-modal pain control   - santiago-op abx x 24 hrs, duricef x 5 days as planned same day discharge   - DVT ppx: aspirin 81 mg BID x 4 weeks  - PT/OT  - WBAT  - ROM as tolerated, no pillows behind knee  - f/u 10-14 days      SIGNATURE: León Gray DO  DATE: March 7, 2024  TIME: 4:50 PM

## 2024-03-07 NOTE — PROGRESS NOTES
"Progress Note - Orthopedics  Kaitlynn Madera 39 y.o. female MRN: [id number]  Unit/Bed#: [facility] -01 Encounter: [id number]    Assessment:  39 y.o F w/ right knee OA s/p Right medial UKA  -hypertensive (not on home BP meds), other VSS  -not urinated on own    Plan:  -Continue WBAT RLE, mepilex, and ACE  -PRN hydralazine  -monitor UOP, continue urinary retention protocol  -ASA 81 mg BID x 4 weeks  -PRN pain meds., yessi tylenol, gabapentin  -Continue Antibiotics- ancef x 24 hrs  -IVF  -Encourage IS      Subjective: Patient resting comfortably in bed, pain controlled. Denies CP/SOB, not using IS yet. Passing flatus, denies nausea. Has not urinated yet.     Objective:     Blood pressure (!) 171/105, pulse 74, temperature 98.6 °F (37 °C), resp. rate 18, height 5' 3\" (1.6 m), weight 92.1 kg (203 lb), last menstrual period 01/23/2024, SpO2 96%, not currently breastfeeding.,Body mass index is 35.96 kg/m².      Intake/Output Summary (Last 24 hours) at 3/7/2024 1827  Last data filed at 3/7/2024 1649  Gross per 24 hour   Intake 1100 ml   Output 50 ml   Net 1050 ml       Invasive Devices       Peripheral Intravenous Line  Duration             Peripheral IV 03/07/24 Dorsal (posterior);Left Hand <1 day                    Physical Exam   Physical Exam:    General: No acute distress, Alert, and Normal appearance  HEENT/NECK:  Normocephalic. Atraumatic.  No jugular venous distention.    Cardiac: Regular rate and rhythm and No murmurs/rubs/gallops  Pulmonary:  Breath sounds clear bilaterally and No rales/rhonchi/wheezes  Abdomen:  Non-tender, Non-distended, and Normal bowel sounds  Incisions: dressed, intact  Extremities: Extremities warm/dry and No edema B/L  Neuro: Alert and oriented X 3 and No focal deficits  Skin: Warm/Dry, without rashes or lesions.       Scheduled Meds:  Current Facility-Administered Medications   Medication Dose Route Frequency Provider Last Rate    acetaminophen  650 mg Oral Q4H PRN Emi García PA-C   "    acetaminophen  975 mg Oral Q8H Emi García PA-C      ascorbic acid  500 mg Oral BID Emi García PA-C      aspirin  81 mg Oral BID Emi García PA-C      calcium carbonate  1,000 mg Oral Daily PRN Emi García PA-C      cefazolin  2,000 mg Intravenous Q8H Emi García PA-C      docusate sodium  100 mg Oral BID Emi García PA-C      [START ON 3/8/2024] folic acid  1 mg Oral Daily Emi García PA-C      gabapentin  300 mg Oral HS Emi García PA-C      hydrALAZINE  5 mg Intravenous Q4H PRN Erum Payne PA-C      ketorolac 15 mg, dexamethasone 10 mg in bupivacaine (PF) 0.25 % 30 mL infiltration   Infiltration Once León Gray DO      lactated ringers  1,000 mL Intravenous Once PRN Emi García PA-C      And    lactated ringers  1,000 mL Intravenous Once PRN Emi García PA-C      lactated ringers  125 mL/hr Intravenous Continuous Davis Corea MD      lactated ringers  100 mL/hr Intravenous Continuous Emi García PA-C 100 mL/hr (03/07/24 1825)    lactated ringers  125 mL/hr Intravenous Continuous Emi García PA-C 125 mL/hr (03/07/24 1706)    morphine injection  2 mg Intravenous Q2H PRN Emi García PA-C      [START ON 3/8/2024] multivitamin-minerals  1 tablet Oral Daily Emi García PA-C      ondansetron  4 mg Intravenous Q6H PRN Emi García PA-C      oxyCODONE  10 mg Oral Q4H PRN Emi García PA-C      oxyCODONE  5 mg Oral Q4H PRN Emi García PA-C      [START ON 3/8/2024] pantoprazole  40 mg Oral Early Morning Emi García PA-C      [START ON 3/8/2024] senna  1 tablet Oral Daily Emi García PA-C      simethicone  80 mg Oral 4x Daily PRN Emi García PA-C      sodium chloride  1,000 mL Intravenous Once PRN Emi García PA-C      And    sodium chloride  1,000 mL Intravenous Once PRN Emi García PA-C       Continuous Infusions:lactated ringers, 125 mL/hr  lactated ringers, 100 mL/hr, Last Rate: 100 mL/hr  (03/07/24 3095)  lactated ringers, 125 mL/hr, Last Rate: 125 mL/hr (03/07/24 1706)      PRN Meds:.  acetaminophen    calcium carbonate    hydrALAZINE    lactated ringers **AND** lactated ringers    morphine injection    ondansetron    oxyCODONE    oxyCODONE    simethicone    sodium chloride **AND** sodium chloride      Lab, Imaging and other studies:I have personally reviewed pertinent lab results.    VTE Pharmacologic Prophylaxis: Sequential compression device (Venodyne)   VTE Mechanical Prophylaxis: sequential compression device      Erum Payne PA-C  3/7/2024 6:27 PM

## 2024-03-07 NOTE — DISCHARGE INSTR - AVS FIRST PAGE
Dr. Gray Knee Replacement    What to Expect/Activity  It is normal to have some discomfort in your knee for several days to weeks.  You are weight bearing as tolerated to your operative leg with assist devices.  Please use crutches/walker when ambulating until your follow-up  Swelling and discomfort in the knee is normal for several days after surgery. For the first 2-3 days, use ice around the knee to help. Use for 20-30 minutes every 1-2 hours for 48 hours, while awake. You may continue beyond 48 hours as needed.  Place one or two pillows underneath your calf, not your knee, to reduce swelling.  Physical therapy on your own at home should start as soon as possible (see below). Please perform heel slides and extension exercises on your own as well (see diagram).  Please use incentive spirometer 10 times per hour while awake (see diagram).    Dressing/Wound Care/Bathing  You may remove your toe-to-groin dressing 24 hours after surgery. There will be a surgical dressing over your incision that stays in place until follow-up unless water gets under the bandage and then it should be removed.   You may start showering 24 hours after surgery, the surgical dressing will remain in place. Please pat the dressing dry. If you notice the dressing appears saturated or is starting to come off, please replace with dry dressing.  You can keep the dressing in place until follow-up in the office.   Do not place any creams, ointments or gels on or around the incision.  No baths, swimming or submerging until cleared by Dr. Gray    Pain Management/Medications  You may resume your usual medications.  Please take the following medications:  Anti-coagulation (blood clot prevention) - aspirin 81mg twice daily for 4 weeks  Pain medication:  Narcotic: Take as directed  NSAID/Anti-inflammatory: Take as directed  Tylenol 1000mg every 8 hours  Zofran (ondasetron) - 4mg every 8 hours as needed for nausea  Stool softeners (senna/colace) -  take daily to prevent constipation as narcotic pain medication causes constipation  Antibiotic - take as directed if prescribed   If you have questions or pain concerns, please contact the office. Pain medication cannot remove all post-operative pain.    Follow up/Call if:  The findings of your surgery will be explained to you and your family immediately after surgery. However, in the post-operative period, during recovery from anesthesia you may not fully remember or fully understand what was said. This will be again gone over when you return for your post-op appointment.  Please contact Dr. Gray's office if you experience the following:  Excessive bleeding (bleeding through your dressing)  Fever greater than 101 degrees F after 48 hours (low grade fevers the day or two after surgery are normal)  Persistent nausea or vomiting  Decreased sensation or discoloration of the operative limb  Pain or swelling that is getting worse and not better with medication    Dr. Gray's Office Contact: [phone]

## 2024-03-08 VITALS
BODY MASS INDEX: 35.97 KG/M2 | HEIGHT: 63 IN | HEART RATE: 75 BPM | RESPIRATION RATE: 18 BRPM | WEIGHT: 203 LBS | TEMPERATURE: 98.7 F | DIASTOLIC BLOOD PRESSURE: 93 MMHG | OXYGEN SATURATION: 97 % | SYSTOLIC BLOOD PRESSURE: 153 MMHG

## 2024-03-08 LAB
ANION GAP SERPL CALCULATED.3IONS-SCNC: 7 MMOL/L
BUN SERPL-MCNC: 16 MG/DL (ref 5–25)
CALCIUM SERPL-MCNC: 8.9 MG/DL (ref 8.4–10.2)
CHLORIDE SERPL-SCNC: 105 MMOL/L (ref 96–108)
CO2 SERPL-SCNC: 26 MMOL/L (ref 21–32)
CREAT SERPL-MCNC: 0.7 MG/DL (ref 0.6–1.3)
ERYTHROCYTE [DISTWIDTH] IN BLOOD BY AUTOMATED COUNT: 13.2 % (ref 11.6–15.1)
GFR SERPL CREATININE-BSD FRML MDRD: 107 ML/MIN/1.73SQ M
GLUCOSE SERPL-MCNC: 132 MG/DL (ref 65–140)
HCT VFR BLD AUTO: 39.7 % (ref 34.8–46.1)
HGB BLD-MCNC: 13.3 G/DL (ref 11.5–15.4)
MCH RBC QN AUTO: 29.6 PG (ref 26.8–34.3)
MCHC RBC AUTO-ENTMCNC: 33.5 G/DL (ref 31.4–37.4)
MCV RBC AUTO: 88 FL (ref 82–98)
PLATELET # BLD AUTO: 278 THOUSANDS/UL (ref 149–390)
PMV BLD AUTO: 9.4 FL (ref 8.9–12.7)
POTASSIUM SERPL-SCNC: 4.1 MMOL/L (ref 3.5–5.3)
RBC # BLD AUTO: 4.5 MILLION/UL (ref 3.81–5.12)
SODIUM SERPL-SCNC: 138 MMOL/L (ref 135–147)
WBC # BLD AUTO: 11.89 THOUSAND/UL (ref 4.31–10.16)

## 2024-03-08 PROCEDURE — 85027 COMPLETE CBC AUTOMATED: CPT | Performed by: PHYSICIAN ASSISTANT

## 2024-03-08 PROCEDURE — 99024 POSTOP FOLLOW-UP VISIT: CPT | Performed by: STUDENT IN AN ORGANIZED HEALTH CARE EDUCATION/TRAINING PROGRAM

## 2024-03-08 PROCEDURE — 80048 BASIC METABOLIC PNL TOTAL CA: CPT | Performed by: PHYSICIAN ASSISTANT

## 2024-03-08 PROCEDURE — 97530 THERAPEUTIC ACTIVITIES: CPT

## 2024-03-08 PROCEDURE — 97535 SELF CARE MNGMENT TRAINING: CPT

## 2024-03-08 PROCEDURE — 97162 PT EVAL MOD COMPLEX 30 MIN: CPT

## 2024-03-08 PROCEDURE — 97166 OT EVAL MOD COMPLEX 45 MIN: CPT

## 2024-03-08 RX ORDER — VENLAFAXINE HYDROCHLORIDE 75 MG/1
150 CAPSULE, EXTENDED RELEASE ORAL DAILY
Status: DISCONTINUED | OUTPATIENT
Start: 2024-03-08 | End: 2024-03-08

## 2024-03-08 RX ORDER — VENLAFAXINE HYDROCHLORIDE 75 MG/1
150 CAPSULE, EXTENDED RELEASE ORAL DAILY
Status: DISCONTINUED | OUTPATIENT
Start: 2024-03-08 | End: 2024-03-08 | Stop reason: HOSPADM

## 2024-03-08 RX ADMIN — OXYCODONE HYDROCHLORIDE 10 MG: 10 TABLET ORAL at 07:15

## 2024-03-08 RX ADMIN — CEFAZOLIN SODIUM 2000 MG: 2 SOLUTION INTRAVENOUS at 06:34

## 2024-03-08 RX ADMIN — ASPIRIN 81 MG: 81 TABLET, COATED ORAL at 08:48

## 2024-03-08 RX ADMIN — PANTOPRAZOLE SODIUM 40 MG: 40 TABLET, DELAYED RELEASE ORAL at 06:04

## 2024-03-08 RX ADMIN — ACETAMINOPHEN 325MG 975 MG: 325 TABLET ORAL at 07:15

## 2024-03-08 RX ADMIN — OXYCODONE HYDROCHLORIDE AND ACETAMINOPHEN 500 MG: 500 TABLET ORAL at 08:48

## 2024-03-08 RX ADMIN — SENNOSIDES 8.6 MG: 8.6 TABLET, FILM COATED ORAL at 08:48

## 2024-03-08 RX ADMIN — Medication 1 TABLET: at 08:48

## 2024-03-08 RX ADMIN — FOLIC ACID 1 MG: 1 TABLET ORAL at 08:48

## 2024-03-08 RX ADMIN — DOCUSATE SODIUM 100 MG: 100 CAPSULE, LIQUID FILLED ORAL at 08:48

## 2024-03-08 RX ADMIN — CEFAZOLIN SODIUM 2000 MG: 2 SOLUTION INTRAVENOUS at 00:00

## 2024-03-08 RX ADMIN — VENLAFAXINE HYDROCHLORIDE 150 MG: 75 CAPSULE, EXTENDED RELEASE ORAL at 08:48

## 2024-03-08 NOTE — PLAN OF CARE
Problem: PAIN - ADULT  Goal: Verbalizes/displays adequate comfort level or baseline comfort level  Description: Interventions:  - Encourage patient to monitor pain and request assistance  - Assess pain using appropriate pain scale  - Administer analgesics based on type and severity of pain and evaluate response  - Implement non-pharmacological measures as appropriate and evaluate response  - Consider cultural and social influences on pain and pain management  - Notify physician/advanced practitioner if interventions unsuccessful or patient reports new pain  3/8/2024 0842 by South Rodgers RN  Outcome: Progressing  3/7/2024 1908 by South Rodgers RN  Outcome: Progressing     Problem: INFECTION - ADULT  Goal: Absence or prevention of progression during hospitalization  Description: INTERVENTIONS:  - Assess and monitor for signs and symptoms of infection  - Monitor lab/diagnostic results  - Monitor all insertion sites, i.e. indwelling lines, tubes, and drains  - Monitor endotracheal if appropriate and nasal secretions for changes in amount and color  - [place] appropriate cooling/warming therapies per order  - Administer medications as ordered  - Instruct and encourage patient and family to use good hand hygiene technique  - Identify and instruct in appropriate isolation precautions for identified infection/condition  3/8/2024 0842 by South Rodgers RN  Outcome: Progressing  3/7/2024 1908 by South Rodgers RN  Outcome: Progressing  Goal: Absence of fever/infection during neutropenic period  Description: INTERVENTIONS:  - Monitor WBC    3/8/2024 0842 by South Rodgers RN  Outcome: Progressing  3/7/2024 1908 by South Rodgers RN  Outcome: Progressing     Problem: SAFETY ADULT  Goal: Patient will remain free of falls  Description: INTERVENTIONS:  - Educate patient/family on patient safety including physical limitations  - Instruct patient to call for assistance with activity   - Consult OT/PT  to assist with strengthening/mobility   - Keep Call bell within reach  - Keep bed low and locked with side rails adjusted as appropriate  - Keep care items and personal belongings within reach  - Initiate and maintain comfort rounds  - Make Fall Risk Sign visible to staff  - Offer Toileting every 2 Hours, in advance of need  - Initiate/Maintain bed/chair alarm  - Obtain necessary fall risk management equipment: rolling walker  - Apply yellow socks and bracelet for high fall risk patients  - Consider moving patient to room near nurses station  3/8/2024 0842 by South Rodgers RN  Outcome: Progressing  3/7/2024 1908 by South Rodgers RN  Outcome: Progressing  Goal: Maintain or return to baseline ADL function  Description: INTERVENTIONS:  -  Assess patient's ability to carry out ADLs; assess patient's baseline for ADL function and identify physical deficits which impact ability to perform ADLs (bathing, care of mouth/teeth, toileting, grooming, dressing, etc.)  - Assess/evaluate cause of self-care deficits   - Assess range of motion  - Assess patient's mobility; develop plan if impaired  - Assess patient's need for assistive devices and provide as appropriate  - Encourage maximum independence but intervene and supervise when necessary  - Involve family in performance of ADLs  - Assess for home care needs following discharge   - Consider OT consult to assist with ADL evaluation and planning for discharge  - Provide patient education as appropriate  3/8/2024 0842 by South Rodgers RN  Outcome: Progressing  3/7/2024 1908 by South Rodgers RN  Outcome: Progressing  Goal: Maintains/Returns to pre admission functional level  Description: INTERVENTIONS:  - Perform AM-PAC 6 Click Basic Mobility/ Daily Activity assessment daily.  - Set and communicate daily mobility goal to care team and patient/family/caregiver.   - Collaborate with rehabilitation services on mobility goals if consulted  - Perform Range of  Motion 3 times a day.  - Reposition patient every 2 hours.  - Dangle patient 3 times a day  - Stand patient 3 times a day  - Ambulate patient 3 times a day  - Out of bed to chair 3 times a day   - Out of bed for meals 3 times a day  - Out of bed for toileting  - Record patient progress and toleration of activity level   3/8/2024 0842 by South Rodgers RN  Outcome: Progressing  3/7/2024 1908 by South Rodgers RN  Outcome: Progressing     Problem: DISCHARGE PLANNING  Goal: Discharge to home or other facility with appropriate resources  Description: INTERVENTIONS:  - Identify barriers to discharge w/patient and caregiver  - Arrange for needed discharge resources and transportation as appropriate  - Identify discharge learning needs (meds, wound care, etc.)  - Arrange for interpretive services to assist at discharge as needed  - Refer to Case Management Department for coordinating discharge planning if the patient needs post-hospital services based on physician/advanced practitioner order or complex needs related to functional status, cognitive ability, or social support system  3/8/2024 0842 by South Rodgers RN  Outcome: Progressing  3/7/2024 1908 by South Rodgers RN  Outcome: Progressing     Problem: Knowledge Deficit  Goal: Patient/family/caregiver demonstrates understanding of disease process, treatment plan, medications, and discharge instructions  Description: Complete learning assessment and assess knowledge base.  Interventions:  - Provide teaching at level of understanding  - Provide teaching via preferred learning methods  3/8/2024 0842 by South Rodgers RN  Outcome: Progressing  3/7/2024 1908 by South Rodgers RN  Outcome: Progressing

## 2024-03-08 NOTE — QUICK NOTE
Patient likely vasovagal when going to bathroom. Systolic in 80s. Associated with nausea, now laying in bed with BP improvement. Currently asymptomatic.  notified (patient request)

## 2024-03-08 NOTE — PHYSICAL THERAPY NOTE
PT EVALUATION    Pt. Name: Kaitlynn Madera  Pt. Age: 39 y.o.  MRN: [id number]  LENGTH OF STAY: 0    Patient Active Problem List   Diagnosis    Migraines    Motion sickness    Depression with anxiety    Morbid obesity with BMI of 40.0-44.9, adult (HCC)    Radiculitis of right cervical region    IUD (intrauterine device) in place    Mixed hyperlipidemia    Primary osteoarthritis of right knee       Admitting Diagnoses:   Primary osteoarthritis of right knee [M17.11]    Past Medical History:   Diagnosis Date    Anemia     hx of anemia - per pt was told with trying to donate blood    Anesthesia     2/12/24 per pt - reports with epidural use - pt experienced big BP drop    Anxiety     Blood type, Rh negative     Resolved 2/18/2016     Kidney stone     Knee pain, right     Migraine     PONV (postoperative nausea and vomiting)        Past Surgical History:   Procedure Laterality Date    EAR SURGERY      Last impression 8/5/2015     MYRINGOTOMY      TONSILLECTOMY      WISDOM TOOTH EXTRACTION         Imaging Studies:  XR knee right 1 or 2 views    (Results Pending)     Hx/personal factors: inaccessible home, use of AD, pain, WB restrictions, and fall risk  Examination: dec mobility, dec balance, dec endurance, dec amb, risk for falls, pain, assessed body system, balance, endurance, amb, D/C disposition & fall risk, WB restrictions, impairements in locomotion, musculoskeletal, balance, endurance, posture, coordination  Clinical: unpredictable (risk for falls and pain mgt)  Complexity: moderate    Anaya Lakhani, PT

## 2024-03-08 NOTE — PLAN OF CARE
Problem: PAIN - ADULT  Goal: Verbalizes/displays adequate comfort level or baseline comfort level  Description: Interventions:  - Encourage patient to monitor pain and request assistance  - Assess pain using appropriate pain scale  - Administer analgesics based on type and severity of pain and evaluate response  - Implement non-pharmacological measures as appropriate and evaluate response  - Consider cultural and social influences on pain and pain management  - Notify physician/advanced practitioner if interventions unsuccessful or patient reports new pain  Outcome: Progressing     Problem: INFECTION - ADULT  Goal: Absence or prevention of progression during hospitalization  Description: INTERVENTIONS:  - Assess and monitor for signs and symptoms of infection  - Monitor lab/diagnostic results  - Monitor all insertion sites, i.e. indwelling lines, tubes, and drains  - Monitor endotracheal if appropriate and nasal secretions for changes in amount and color  - [place] appropriate cooling/warming therapies per order  - Administer medications as ordered  - Instruct and encourage patient and family to use good hand hygiene technique  - Identify and instruct in appropriate isolation precautions for identified infection/condition  Outcome: Progressing  Goal: Absence of fever/infection during neutropenic period  Description: INTERVENTIONS:  - Monitor WBC    Outcome: Progressing     Problem: SAFETY ADULT  Goal: Patient will remain free of falls  Description: INTERVENTIONS:  - Educate patient/family on patient safety including physical limitations  - Instruct patient to call for assistance with activity   - Consult OT/PT to assist with strengthening/mobility   - Keep Call bell within reach  - Keep bed low and locked with side rails adjusted as appropriate  - Keep care items and personal belongings within reach  - Initiate and maintain comfort rounds  - Make Fall Risk Sign visible to staff  - Offer Toileting every 2 Hours,  in advance of need  - Initiate/Maintain bed alarm  - Obtain necessary fall risk management equipment: walker  - Apply yellow socks and bracelet for high fall risk patients  - Consider moving patient to room near nurses station  Outcome: Progressing  Goal: Maintain or return to baseline ADL function  Description: INTERVENTIONS:  -  Assess patient's ability to carry out ADLs; assess patient's baseline for ADL function and identify physical deficits which impact ability to perform ADLs (bathing, care of mouth/teeth, toileting, grooming, dressing, etc.)  - Assess/evaluate cause of self-care deficits   - Assess range of motion  - Assess patient's mobility; develop plan if impaired  - Assess patient's need for assistive devices and provide as appropriate  - Encourage maximum independence but intervene and supervise when necessary  - Involve family in performance of ADLs  - Assess for home care needs following discharge   - Consider OT consult to assist with ADL evaluation and planning for discharge  - Provide patient education as appropriate  Outcome: Progressing  Goal: Maintains/Returns to pre admission functional level  Description: INTERVENTIONS:  - Perform AM-PAC 6 Click Basic Mobility/ Daily Activity assessment daily.  - Set and communicate daily mobility goal to care team and patient/family/caregiver.   - Collaborate with rehabilitation services on mobility goals if consulted  - Perform Range of Motion 3 times a day.  - Reposition patient every 2 hours.  - Dangle patient 3 times a day  - Stand patient 3 times a day  - Ambulate patient 3 times a day  - Out of bed to chair 3 times a day   - Out of bed for meals 3 times a day  - Out of bed for toileting  - Record patient progress and toleration of activity level   Outcome: Progressing     Problem: DISCHARGE PLANNING  Goal: Discharge to home or other facility with appropriate resources  Description: INTERVENTIONS:  - Identify barriers to discharge w/patient and  caregiver  - Arrange for needed discharge resources and transportation as appropriate  - Identify discharge learning needs (meds, wound care, etc.)  - Arrange for interpretive services to assist at discharge as needed  - Refer to Case Management Department for coordinating discharge planning if the patient needs post-hospital services based on physician/advanced practitioner order or complex needs related to functional status, cognitive ability, or social support system  Outcome: Progressing     Problem: Knowledge Deficit  Goal: Patient/family/caregiver demonstrates understanding of disease process, treatment plan, medications, and discharge instructions  Description: Complete learning assessment and assess knowledge base.  Interventions:  - Provide teaching at level of understanding  - Provide teaching via preferred learning methods  Outcome: Progressing

## 2024-03-08 NOTE — PROGRESS NOTES
Orthopedics  Kaitlynngardenia Madera 39 y.o. female MRN: [id number]  Unit/Bed#: WE 2 N -01        Subjective:    39 y.o.female seen and examined at the bedside.  Reports 4.5/10 pain in the right knee this AM.  Had a vasovagal episode yesterday when going to the bathroom.  This AM patient denies any dizziness or lightheadedness.  No numbness/tingling in the RLE.          Objective:    Labs:  0   Lab Value Date/Time    HCT 39.7 03/08/2024 0438    HCT 44.5 02/12/2024 1331    HCT 47.1 (H) 09/13/2023 1023    HCT 31.4 (L) 12/23/2015 0445    HCT 38.9 12/21/2015 2216    HCT 35.2 10/13/2015 1007    HGB 13.3 03/08/2024 0438    HGB 14.5 02/12/2024 1331    HGB 14.7 09/13/2023 1023    HGB 10.8 (L) 12/23/2015 0445    HGB 13.1 12/21/2015 2216    HGB 11.9 10/13/2015 1007    INR 0.93 02/12/2024 1331    WBC 11.89 (H) 03/08/2024 0438    WBC 7.17 02/12/2024 1331    WBC 8.17 09/13/2023 1023    WBC 9.10 12/23/2015 0445    WBC 10.65 (H) 12/21/2015 2216    WBC 8.39 10/13/2015 1007       Meds:    Current Facility-Administered Medications:     acetaminophen (TYLENOL) tablet 650 mg, 650 mg, Oral, Q4H PRN, Emi García PA-C    acetaminophen (TYLENOL) tablet 975 mg, 975 mg, Oral, Q8H, Emi García PA-C, 975 mg at 03/07/24 1835    ascorbic acid (VITAMIN C) tablet 500 mg, 500 mg, Oral, BID, Emi García PA-C, 500 mg at 03/07/24 1835    aspirin (ECOTRIN LOW STRENGTH) EC tablet 81 mg, 81 mg, Oral, BID, Emi García PA-C, 81 mg at 03/07/24 2035    calcium carbonate (TUMS) chewable tablet 1,000 mg, 1,000 mg, Oral, Daily PRN, Emi García PA-C    ceFAZolin (ANCEF) IVPB (premix in dextrose) 2,000 mg 50 mL, 2,000 mg, Intravenous, Q8H, Emi García PA-C, Last Rate: 100 mL/hr at 03/08/24 0634, 2,000 mg at 03/08/24 0634    docusate sodium (COLACE) capsule 100 mg, 100 mg, Oral, BID, Emi García PA-C, 100 mg at 03/07/24 1835    folic acid (FOLVITE) tablet 1 mg, 1 mg, Oral, Daily, Emi García PA-C    gabapentin (NEURONTIN)  capsule 300 mg, 300 mg, Oral, HS, Emi García PA-C, 300 mg at 03/07/24 2206    hydrALAZINE (APRESOLINE) injection 5 mg, 5 mg, Intravenous, Q4H PRN, Erum Payne PA-C, 5 mg at 03/07/24 1835    ketorolac 15 mg, dexamethasone 10 mg in bupivacaine (PF) 0.25 % 30 mL infiltration, , Infiltration, Once, León Gray DO    lactated ringers bolus 1,000 mL, 1,000 mL, Intravenous, Once PRN **AND** lactated ringers bolus 1,000 mL, 1,000 mL, Intravenous, Once PRN, Emi García PA-C, Last Rate: 1,000 mL/hr at 03/07/24 2000, 1,000 mL at 03/07/24 2000    lactated ringers infusion, 125 mL/hr, Intravenous, Continuous, Davis Corea MD    lactated ringers infusion, 100 mL/hr, Intravenous, Continuous, Emi García PA-C, Last Rate: 100 mL/hr at 03/07/24 2102, 100 mL/hr at 03/07/24 2102    lactated ringers infusion, 125 mL/hr, Intravenous, Continuous, Emi García PA-C, Last Rate: 125 mL/hr at 03/07/24 1706, 125 mL/hr at 03/07/24 1706    morphine injection 2 mg, 2 mg, Intravenous, Q2H PRN, Emi García PA-C    multivitamin-minerals (CENTRUM) tablet 1 tablet, 1 tablet, Oral, Daily, Emi García PA-C    ondansetron (ZOFRAN) injection 4 mg, 4 mg, Intravenous, Q6H PRN, Emi García PA-C    oxyCODONE (ROXICODONE) immediate release tablet 10 mg, 10 mg, Oral, Q4H PRN, Emi García PA-C    oxyCODONE (ROXICODONE) IR tablet 5 mg, 5 mg, Oral, Q4H PRN, Emi García PA-C, 5 mg at 03/07/24 2206    pantoprazole (PROTONIX) EC tablet 40 mg, 40 mg, Oral, Early Morning, Emi García PA-C, 40 mg at 03/08/24 0604    scopolamine (TRANSDERM-SCOP) 1 mg/3 days TD 72 hr patch 1 patch, 1 patch, Transdermal, Once, Erum Payne PA-C    senna (SENOKOT) tablet 8.6 mg, 1 tablet, Oral, Daily, Emi García PA-C    simethicone (MYLICON) chewable tablet 80 mg, 80 mg, Oral, 4x Daily PRN, Emi García PA-C    sodium chloride 0.9 % bolus 1,000 mL, 1,000 mL, Intravenous, Once PRN **AND** sodium chloride 0.9 %  "bolus 1,000 mL, 1,000 mL, Intravenous, Once PRN, Emi García PA-C    Blood Culture:   No results found for: \"BLOODCX\"    Wound Culture:   No results found for: \"WOUNDCULT\"    Ins and Outs:  I/O last 24 hours:  In: 1100 [I.V.:1000; IV Piggyback:100]  Out: 1200 [Urine:1150; Blood:50]      Orthopedic Physical Exam:    Vitals:    03/08/24 0652   BP: 135/84   Pulse: 70   Resp: 18   Temp: 98.7 °F (37.1 °C)   SpO2: 94%   Lying supine in bed, NAD.  Breathing unlabored.  right Lower Extremity extremity:  ACE Dressing C/D/I, no saturation or leaking.  Visible skin no erythema or ecchymosis.  Thigh and Lower leg soft  5/5 strength ankle DF/PF, EHL/FHL  SILT RLE  No calf tenderness or pitting edema  LE is warm and well perfused    _*_*_*_*_*_*_*_*_*_*_*_*_*_*_*_*_*_*_*_*_*_*_*_*_*_*_*_*_*_*_*_*_*_*_*_*_*_*_*_*_*    Assessment:     39 y.o.female POD 1 s/p right knee medial unicompartmental arthroplasty 3/7/2024 with Dr. Gray.  Doing well.        Plan:    Weight bearing as tolerated right lower extremity  Up and out of bed  ROM as tolerated, no pillows behind knee  DVT prophylaxis - ASA 81mg BID x 4 weeks  Analgesics  PT/OT  Dispo: Ortho will follow, discharge planning, needs PT/OT evaluation  Will continue to assess for acute blood loss anemia.  H 13.3 this AM.  VSS.          Manuel Rivera PA-C            "

## 2024-03-08 NOTE — PLAN OF CARE
Problem: PHYSICAL THERAPY ADULT  Goal: Performs mobility at highest level of function for planned discharge setting.  See evaluation for individualized goals.  Description: Treatment/Interventions: ADL retraining, Functional transfer training, LE strengthening/ROM, Elevations, Therapeutic exercise, Endurance training, Patient/family training, Gait training, Bed mobility, Spoke to nursing, OT  Equipment Recommended:  (Has FWW and SPC)       See flowsheet documentation for full assessment, interventions and recommendations.  Note: Prognosis: Good  Problem List: Decreased strength, Decreased range of motion, Decreased endurance, Impaired balance, Decreased mobility, Decreased coordination, Orthopedic restrictions, Decreased skin integrity, Pain  Assessment: Pt is a 39 y.o. female who presented to [facility] on [facility]24 s/p R medial UKA done by Dr. Gray. Precautions include WBAT. Pt  has a past medical history of Anemia, Anesthesia, Anxiety, Blood type, Rh negative, Kidney stone, Knee pain, right, Migraine, and PONV (postoperative nausea and vomiting).. Pt greeted bedside for PT evaluation on 03/08/24. Pt referred to PT for functional mobility evaluation & D/C planning w/ orders of up w/ assistance and Activity - Beginning POD#1 . PTA, pt reports being I w/o AD. Personal factors affecting pt at time of IE include: inaccessible home environment, lives in 2 story house, stairs to enter home, inability to ambulate household distances, inability to navigate community distances, and inability to navigate level surfaces w/o external assistance. Please find objective findings from PT assessment regarding body systems outlined above with impairments and limitations including weakness, decreased ROM, impaired balance, decreased endurance, impaired coordination, gait deviations, pain, decreased activity tolerance, decreased functional mobility tolerance, fall risk, orthopedic restrictions, and decreased skin integrity.  Please see  flow sheet above for objective findings and level of assistance required for safe completion of functional tasks. Pt ambulated 15'x2, 10'x1 with supervision x 1 and manual tactile cues for safety with FWW, VC's for proper hand placement and LE management. Pt also able to complete 4 steps as noted in flowsheet with supervision x 1 and manual tactile cues for safety, B/L railings and SPC with VC's for hand placement and LE management. Pt demonstrated dec endurance and tolerance to activity.  Pt's clinical presentation is currently stable . Patient was leftbedside chair with call bell and all needs within reach. Pt was educated on fall precautions and reinforced w/ good understanding. Based on pt presentation and impaired function, pt would benefit from level III, (minimal resource intensity) at D/C. From PT/mobility standpoint, pt would benefit from continued OP PT to address deficits as defined above and maximize level of independence and return pt to PLOF. HEP given and reviewed with patient, no questions at this time. CM to follow. Nsg staff to continue to mobilized pt (OOB in chair for all meals & ambulate in room/unit) as tolerated to prevent further decline in function.  Barriers to Discharge: Inaccessible home environment (2 [address])     Rehab Resource Intensity Level, PT: III (Minimum Resource Intensity)    See flowsheet documentation for full assessment.

## 2024-03-08 NOTE — CASE MANAGEMENT
Case Management Progress Note    Patient name Kaitlynn Madera  Location 2 N /WE 2 N * MRN [id number]  : 1985 Date 3/8/2024       LOS (days): 0  Geometric Mean LOS (GMLOS) (days):   Days to GMLOS:        OBJECTIVE:        Current admission status: Outpatient Surgery  Preferred Pharmacy:   [facility] - [place], [place] - [address]  [address]  [place] [address]  Phone: [phone] Fax: [phone]    [facility]pharmacy #[id number] - [place], [place] - [address]  [address]  [place] [address]  Phone: [phone] Fax: [phone]    [facility] [place] [facility] [place] - [address]  [facility],  [address]  [facility],  First [address] [facility] [place] [address]  Phone: [phone] Fax: [phone]    Primary Care Provider: Shannon Murrieta PA-C    Primary Insurance: CAPITAL  Secondary Insurance:     PROGRESS NOTE:    CM consulted by orthopedics s/p knee replacement. Per Ortho pre-op assessment/PT assessment, pt resides in a two story home with spouse and was independent with ADLs prior to admission. Pt has a RW. Therapy recommending OP therapy. No CM needs identified at this time. CM will continue to follow.

## 2024-03-08 NOTE — OCCUPATIONAL THERAPY NOTE
Occupational Therapy Evaluation & Treatment     Patient Name: Kaitlynn Madera  Today's Date: 3/8/2024  Problem List  Principal Problem:    Primary osteoarthritis of right knee    Past Medical History  Past Medical History:   Diagnosis Date    Anemia     hx of anemia - per pt was told with trying to donate blood    Anesthesia     2/12/24 per pt - reports with epidural use - pt experienced big BP drop    Anxiety     Blood type, Rh negative     Resolved 2/18/2016     Kidney stone     Knee pain, right     Migraine     PONV (postoperative nausea and vomiting)      Past Surgical History  Past Surgical History:   Procedure Laterality Date    EAR SURGERY      Last impression 8/5/2015     MYRINGOTOMY      SD ARTHRP KNEE CONDYLE&PLATEAU MEDIAL/LAT CMPRT Right 3/7/2024    Procedure: ARTHROPLASTY KNEE UNICOMPARTMENTAL;  Surgeon: León Gray DO;  Location:  MAIN OR;  Service: Orthopedics    TONSILLECTOMY      WISDOM TOOTH EXTRACTION           03/08/24 0728   OT Last Visit   OT Visit Date 03/08/24   Note Type   Note type Evaluation  (and treatment)   Pain Assessment   Pain Assessment Tool 0-10   Pain Score 4   Pain Location/Orientation Orientation: Right;Location: Knee   Hospital Pain Intervention(s) Repositioned;Cold applied;Ambulation/increased activity   Restrictions/Precautions   Weight Bearing Precautions Per Order Yes   RLE Weight Bearing Per Order WBAT   LLE Weight Bearing Per Order WBAT   Other Precautions WBS;Fall Risk;Pain   Home Living   Type of Home House   Home Layout Two level;Stairs to enter with rails;1/2 bath on main level;Bed/bath upstairs  (Plans to have FFSU upon DC until OPPT appt)   Bathroom Shower/Tub Walk-in shower   Bathroom Toilet Standard  (BSC over toilet)   Bathroom Equipment Commode   Home Equipment Walker;Cane   Additional Comments Pt resides with spouse and 3 children (16yo, 14yo, and 7 yo) in a [facility] with 2 [employer].   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 SPC for very long distances/ +. Pt with supportive spouse able to assist upon DC.   Lifestyle   Autonomy I with ADL/IADLs/no AD v. SPC for very long distances/ +   Reciprocal Relationships Supportive spouse   Service to Others stay at home mom   Intrinsic Gratification Enjoys camping has a trip planned to    Subjective   Subjective \"I am feeling good\"   ADL   Where Assessed Edge of bed   Eating Assistance 7  Independent   Grooming Assistance 5  Supervision/Setup   UB Bathing Assistance 5  Supervision/Setup   LB Bathing Assistance 5  Supervision/Setup   UB Dressing Assistance 5  Supervision/Setup   LB Dressing Assistance 5  Supervision/Setup   Toileting Assistance  5  Supervision/Setup   Toileting Deficit Setup;Supervison/safety;Increased time to complete;Bedside commode  (BSC over toilet)   Functional Assistance 5  Supervision/Setup   Additional Comments Pt provided with Knee athroplasty handout and educated on safety/compensatory techniques with ADLs.   Bed Mobility   Supine to Sit 5  Supervision   Additional items HOB elevated;Increased time required;Verbal cues   Sit to Supine Unable to assess   Additional Comments Pt greeted supine in bed. BP: 153/96 supine, BP seated: 150/93, BP standin/93, BP seated s/p toiletin/93 and BP seated at end of session: 153/93. Pt reports only slight lightheadedness seated on EOB upon bed mobility and resolved with rest.   Transfers   Sit to Stand 5  Supervision   Additional items Increased time required;Verbal cues   Stand to Sit 5  Supervision   Additional items Increased time required;Verbal cues   Toilet transfer 5  " Supervision   Additional items Increased time required;Commode  (BSC over toilet)   Functional Mobility   Functional Mobility 5  Supervision   Additional Comments S with functional mobility with RW- household distances with RW   Additional items Rolling walker   Balance   Static Sitting Fair +   Dynamic Sitting Fair   Static Standing Fair -   Dynamic Standing Poor +   Ambulatory Poor +   Activity Tolerance   Activity Tolerance Patient tolerated treatment well   Medical Staff Made Aware Co-eval with ANGEL Julien 2* to Pt's medical complexity, post-surgical, and decreased endurance.   Nurse Made Aware RN cleared/updated.   RUE Assessment   RUE Assessment WFL   LUE Assessment   LUE Assessment WFL   Hand Function   Gross Motor Coordination Functional   Fine Motor Coordination Functional   Sensation   Light Touch No apparent deficits   Vision-Basic Assessment   Current Vision Wears glasses all the time   Psychosocial   Psychosocial (WDL) WDL   Cognition   Overall Cognitive Status WFL   Arousal/Participation Cooperative;Alert   Attention Within functional limits   Orientation Level Oriented X4   Memory Within functional limits   Following Commands Follows all commands and directions without difficulty   Comments Pt very pleasant and cooperative during OT session.   Assessment   Limitation Decreased ADL status;Decreased Safe judgement during ADL;Decreased endurance;Decreased self-care trans;Decreased high-level ADLs   Prognosis Good   Assessment Pt is a 39 y.o. female who presented to [facility] on 3/7/2024 with OA of R knee. Pt s/p R UKA on 3/7. Pt WBAT on B/L LE. Pt  has a past medical history of Anemia, Anesthesia, Anxiety, Blood type, Rh negative, Kidney stone, Knee pain, right, Migraine, and PONV (postoperative nausea and vomiting). Pt greeted bedside for OT evaluation on 03/08/24. Pt resides with spouse and 3 children (16yo, 12yo, and 7 yo) in a H with 2 [facility]. PTA, Pt reports being I with ADL/IADLs/no AD v. SPC for very  long distances/ +. Pt with supportive spouse able to assist upon DC. Pt demonstrating the following occupational performance levels: S with UB ADLs, S with LB ADLs, S with bed mobility, S with functional transfers, S with functional mobility, and S with toileting. Limitations that impact functional performance include decreased ADL status, decreased endurance, decreased self care transfers, decreased high level ADLs, and pain. Occupational performance areas to address ADL retraining, UE strengthening/ROM, endurance training, Pt/caregiver education, equipment evaluation/education, compensatory technique education, energy conservation, and activity engagement . Pt would benefit from continued skilled OT services while in hospital to maximize independence with ADLs. Will continue to follow Pt's progress. Pt would benefit from returning home with increased social support upon DC to maximize safety and independence with ADLs and functional tasks of choice.   Goals   Patient Goals To go home today.   LTG Time Frame 3-7   Long Term Goal #1 See goals listed below.   Plan   Treatment Interventions ADL retraining;Functional transfer training;Endurance training;Patient/family training;Equipment evaluation/education;Compensatory technique education;Activityengagement;Energy conservation   Goal Expiration Date 03/15/24   OT Frequency 3-5x/wk   Discharge Recommendation   Rehab Resource Intensity Level, OT No post-acute rehabilitation needs   Equipment Recommended Shower/Tub chair with back ($)   Additional Comments  The patient's raw score on the -PAC Daily Activity Inpatient Short Form is 19. A raw score of greater than or equal to 19 suggests the patient may benefit from discharge to home. Please refer to the recommendation of the Occupational Therapist for safe discharge planning.   AM-PAC Daily Activity Inpatient   Lower Body Dressing 3   Bathing 3   Toileting 3   Upper Body Dressing 3   Grooming 3   Eating 4   Daily  Activity Raw Score 19   Daily Activity Standardized Score (Calc for Raw Score >=11) 40.22   AM-PAC Applied Cognition Inpatient   Following a Speech/Presentation 4   Understanding Ordinary Conversation 4   Taking Medications 4   Remembering Where Things Are Placed or Put Away 4   Remembering List of 4-5 Errands 4   Taking Care of Complicated Tasks 4   Applied Cognition Raw Score 24   Applied Cognition Standardized Score 62.21   Additional Treatment Session   Start Time 0800   End Time 0829   Treatment Assessment Pt greeted for follow up treatment focusing on increasing independence with ADLs. Pt engages in LB Dressing seated on EOB at S level with cues for safety/technique. Pt requires S with UB Dressing, and SOTO with grooming tasks. Pt completes multiple STS transfers at S with RW. Pt educated on use of SC upon DC and Pt reports supportive spouse able to assist upon DC.   Additional Treatment Day 1   End of Consult   Education Provided Yes   Patient Position at End of Consult Bedside chair;Bed/Chair alarm activated;All needs within reach   Nurse Communication Nurse aware of consult       GOALS:  Pt will complete UB ADLs with I in order to maximize participation with ADLs.   Pt will complete LB ADLs with I in order to maximize safety with ADLs.   Pt will complete toileting routine (transfer, hygiene, and clothing management) with I in order to return to prior level of function.  Pt will complete bed mobility with I in order to maximize participation with ADLs.   Pt will complete functional transfers at I level in order to increase participation with ADLs.  Pt will increase dynamic standing balance to F+ in order to increase safety with ADLs.  Pt will increase standing tolerance x10 min in order to increase participation with ADLs.   Pt will complete functional mobility with AD PRN for item retrieval task at [place] level in order to increase participation with ADLs.  Pt will complete IADL tasks/simulation of IADLs tasks  with I in order to return to PLOF.  Pt will demonstrate G energy conservation techniques with ADLs/IADLs in order to reduce the risk of falls.  Pt will be attentive 100% of the time for ongoing functional/formal cognitive assessment to assist with safe dc planning prn.            Yvette Figueroa MS, OTR/L

## 2024-03-08 NOTE — PLAN OF CARE
Problem: PHYSICAL THERAPY ADULT  Goal: Performs mobility at highest level of function for planned discharge setting.  See evaluation for individualized goals.  Description: Treatment/Interventions: ADL retraining, Functional transfer training, LE strengthening/ROM, Elevations, Therapeutic exercise, Endurance training, Patient/family training, Gait training, Bed mobility, Spoke to nursing, OT  Equipment Recommended:  (Has FWW and SPC)       See flowsheet documentation for full assessment, interventions and recommendations.  3/8/2024 1029 by Anaya Lakhani PT  Outcome: Adequate for Discharge  Note: Prognosis: Good  Problem List: Decreased strength, Decreased range of motion, Decreased endurance, Impaired balance, Decreased mobility, Decreased coordination, Orthopedic restrictions, Decreased skin integrity, Pain  Assessment: Pt is a 39 y.o. female who presented to [facility] on 3/7/2024 s/p R medial UKA done by Dr. Gray. Precautions include WBAT. Pt  has a past medical history of Anemia, Anesthesia, Anxiety, Blood type, Rh negative, Kidney stone, Knee pain, right, Migraine, and PONV (postoperative nausea and vomiting).. Pt greeted bedside for PT evaluation on 03/08/24. Pt referred to PT for functional mobility evaluation & D/C planning w/ orders of up w/ assistance and Activity - Beginning POD#1 . PTA, pt reports being I w/o AD. Personal factors affecting pt at time of IE include: inaccessible home environment, lives in 2 story house, stairs to enter home, inability to ambulate household distances, inability to navigate community distances, and inability to navigate level surfaces w/o external assistance. Please find objective findings from PT assessment regarding body systems outlined above with impairments and limitations including weakness, decreased ROM, impaired balance, decreased endurance, impaired coordination, gait deviations, pain, decreased activity tolerance, decreased functional mobility tolerance, fall  risk, orthopedic restrictions, and decreased skin integrity.  Please see flow sheet above for objective findings and level of assistance required for safe completion of functional tasks. Pt ambulated 15'x2, 10'x1 with supervision x 1 and manual tactile cues for safety with FWW, VC's for proper hand placement and LE management. Pt also able to complete 4 steps as noted in flowsheet with supervision x 1 and manual tactile cues for safety, B/L railings and SPC with VC's for hand placement and LE management. Pt demonstrated dec endurance and tolerance to activity.  Pt's clinical presentation is currently stable . Patient was leftbedside chair with call bell and all needs within reach. Pt was educated on fall precautions and reinforced w/ good understanding. Based on pt presentation and impaired function, pt would benefit from level III, (minimal resource intensity) at D/C. From PT/mobility standpoint, pt would benefit from continued OP PT to address deficits as defined above and maximize level of independence and return pt to PLOF. HEP given and reviewed with patient, no questions at this time. CM to follow. Nsg staff to continue to mobilized pt (OOB in chair for all meals & ambulate in room/unit) as tolerated to prevent further decline in function.  Barriers to Discharge: Inaccessible home environment (2 [employer])     Rehab Resource Intensity Level, PT: III (Minimum Resource Intensity)    See flowsheet documentation for full assessment.

## 2024-03-08 NOTE — PLAN OF CARE
Problem: OCCUPATIONAL THERAPY ADULT  Goal: Performs self-care activities at highest level of function for planned discharge setting.  See evaluation for individualized goals.  Description: Treatment Interventions: ADL retraining, Functional transfer training, Endurance training, Patient/family training, Equipment evaluation/education, Compensatory technique education, Activityengagement, Energy conservation  Equipment Recommended: Shower/Tub chair with back ($)       See flowsheet documentation for full assessment, interventions and recommendations.   3/8/2024 1047 by Yvette Figueroa OT  Note: Limitation: Decreased ADL status, Decreased Safe judgement during ADL, Decreased endurance, Decreased self-care trans, Decreased high-level ADLs  Prognosis: Good  Assessment: Pt is a 39 y.o. female who presented to [facility] on 3/7/2024 with OA of R knee. Pt s/p R UKA on 3/7. Pt WBAT on B/L LE. Pt  has a past medical history of Anemia, Anesthesia, Anxiety, Blood type, Rh negative, Kidney stone, Knee pain, right, Migraine, and PONV (postoperative nausea and vomiting). Pt greeted bedside for OT evaluation on 03/08/24. Pt resides with spouse and 3 children (14yo, 12yo, and 9 yo) in a [address] with 2 [facility]. PTA, Pt reports being I with ADL/IADLs/no AD v. SPC for very long distances/ +. Pt with supportive spouse able to assist upon DC. Pt demonstrating the following occupational performance levels: S with UB ADLs, S with LB ADLs, S with bed mobility, S with functional transfers, S with functional mobility, and S with toileting. Limitations that impact functional performance include decreased ADL status, decreased endurance, decreased self care transfers, decreased high level ADLs, and pain. Occupational performance areas to address ADL retraining, UE strengthening/ROM, endurance training, Pt/caregiver education, equipment evaluation/education, compensatory technique education, energy conservation, and activity engagement . Pt  would benefit from continued skilled OT services while in hospital to maximize independence with ADLs. Will continue to follow Pt's progress. Pt would benefit from returning home with increased social support upon DC to maximize safety and independence with ADLs and functional tasks of choice.     Rehab Resource Intensity Level, OT: No post-acute rehabilitation needs       3/8/2024 1047 by Yvette Figueroa OT  Note: Limitation: Decreased ADL status, Decreased Safe judgement during ADL, Decreased endurance, Decreased self-care trans, Decreased high-level ADLs  Prognosis: Good  Assessment: Pt is a 39 y.o. female who presented to [facility] on 3/7/2024 with OA of R knee. Pt s/p R UKA on 3/7. Pt WBAT on B/L LE. Pt  has a past medical history of Anemia, Anesthesia, Anxiety, Blood type, Rh negative, Kidney stone, Knee pain, right, Migraine, and PONV (postoperative nausea and vomiting). Pt greeted bedside for OT evaluation on 03/08/24. Pt resides with spouse and 3 children (14yo, 14yo, and 9 yo) in a [facility] with 2 [facility]. PTA, Pt reports being I with ADL/IADLs/no AD v. SPC for very long distances/ +. Pt with supportive spouse able to assist upon DC. Pt demonstrating the following occupational performance levels: S with UB ADLs, S with LB ADLs, S with bed mobility, S with functional transfers, S with functional mobility, and S with toileting. Limitations that impact functional performance include decreased ADL status, decreased endurance, decreased self care transfers, decreased high level ADLs, and pain. Occupational performance areas to address ADL retraining, UE strengthening/ROM, endurance training, Pt/caregiver education, equipment evaluation/education, compensatory technique education, energy conservation, and activity engagement . Pt would benefit from continued skilled OT services while in hospital to maximize independence with ADLs. Will continue to follow Pt's progress. Pt would benefit from returning home with  increased social support upon DC to maximize safety and independence with ADLs and functional tasks of choice.     Rehab Resource Intensity Level, OT: No post-acute rehabilitation needs

## 2024-03-11 ENCOUNTER — TELEPHONE (OUTPATIENT)
Dept: OBGYN CLINIC | Facility: HOSPITAL | Age: 39
End: 2024-03-11

## 2024-03-11 NOTE — TELEPHONE ENCOUNTER
"Patient contacted for a postoperative follow up assessment. Patient reports doing  \"good\" and \"every night is getting better\" and slept through the night last night. She is \"getting up and walking around\" and doing exercises taught to her, she is ambulating with the RW.     Patient states current pain level of a  7/10 this AM. Patient denies increase in swelling, stating \"the same\" and dressing is clean, dry and intact. Patient is icing the site regularly, we discussed continuing to ice over the next few weeks after increased activity.   She has OP PT tomorrow.     We reviewed patients AVS medication list. Patient is taking Tylenol 1000mg every 8 hours, Oxycodone 5mg PRN, Duricef BID, ASA 81mg BID, Senokot daily (Patient has not yet had a BM but is passing gas, discussed adding miralax OTC if needed and increasing fluid and fiber intake). She is using zofran PRN for nausea.     Patient denies  vomiting, abdominal pain, chest pain, shortness of breath, fever, dizziness and calf pain. Patient does not have any other questions or concerns at this time. Pt was encouraged to call with any questions, concerns or issues.    "

## 2024-03-12 ENCOUNTER — OFFICE VISIT (OUTPATIENT)
Dept: PHYSICAL THERAPY | Facility: CLINIC | Age: 39
End: 2024-03-12
Payer: COMMERCIAL

## 2024-03-12 DIAGNOSIS — M17.11 PRIMARY OSTEOARTHRITIS OF RIGHT KNEE: ICD-10-CM

## 2024-03-12 DIAGNOSIS — S83.231D COMPLEX TEAR OF MEDIAL MENISCUS OF RIGHT KNEE AS CURRENT INJURY, SUBSEQUENT ENCOUNTER: Primary | ICD-10-CM

## 2024-03-12 PROCEDURE — 97161 PT EVAL LOW COMPLEX 20 MIN: CPT | Performed by: PHYSICAL THERAPIST

## 2024-03-12 PROCEDURE — 97140 MANUAL THERAPY 1/> REGIONS: CPT | Performed by: PHYSICAL THERAPIST

## 2024-03-12 NOTE — PROGRESS NOTES
PT Evaluation     Today's date: 3/12/2024  Patient name: Kaitlynn Madera  : 1985  MRN: [id number]  Referring provider: Emi García PA-C  Dx:   Encounter Diagnosis     ICD-10-CM    1. Complex tear of medial meniscus of right knee as current injury, subsequent encounter  S83.231D       2. Primary osteoarthritis of right knee  M17.11                      Assessment  Assessment details: Patient is a 39 y.o. female who presents to outpatient PT s/p unicompartmental TKA with pain, decreased rom, decreased strength and decreased functional mobility. He will benefit from skilled PT to address these deficits in order to achieve his goals and maximize his functional mobility.                        Goals  Short Term Goals:   Independent performance of initial hep  Decrease pain 2 points on VAS      Long Term Goals:  Independent performance of comprehensive hep  Work performance is returned to max level of function  Performance of IADL's is returned to max level of function  Performance in recreational activities is improved to max level of function  Able to walk community distance with no AD  Able to climb stairs with reciprocal gait pattern and no rail.      Plan  Planned therapy interventions: manual therapy, massage, strengthening, stretching, therapeutic activities, therapeutic exercise, therapeutic training, transfer training, gait training, home exercise program and IADL retraining  Frequency: 2x week  Duration in weeks: 8        Subjective Evaluation    History of Present Illness  Mechanism of injury: 3/7/24 underwent R knee unicompartmental TKA replacements.   Reports that her pain is well controlled with medication. She has been compliant with using RW for ambulation. Reports she is limited in most IADL's currently.       She would like to return to playing with her children and an otherwise active lifestyle.   Patient Goals  Patient goals for therapy: decreased edema, decreased pain, increased motion,  increased strength, independence with ADLs/IADLs and return to sport/leisure activities    Pain  Current pain ratin  At worst pain ratin                Objective        R knee      ROM   Flexion: 78   Extension: -17      Strength:   Flexion:  3/5   Extension: 3/5    Fair quad set but unable to perform SLR without lag  Mod edema  Mod ecchymosis along posterior thigh  Neg howman's sign             Precautions: R (TKA vs unicompartmental), L knee OA      Manuals 3/12            prom kl                                                   Neuro Re-Ed                                                                                                        Ther Ex             Heel slides             Prone TKE             saq             Slr flexion             Cone taps             Step ups             Mini squats                          Ther Activity             bike                          Gait Training                                       Modalities             Cp with extension hang

## 2024-03-14 ENCOUNTER — OFFICE VISIT (OUTPATIENT)
Dept: PHYSICAL THERAPY | Facility: CLINIC | Age: 39
End: 2024-03-14
Payer: COMMERCIAL

## 2024-03-14 DIAGNOSIS — S83.231A COMPLEX TEAR OF MEDIAL MENISCUS OF RIGHT KNEE, UNSPECIFIED WHETHER OLD OR CURRENT TEAR, INITIAL ENCOUNTER: ICD-10-CM

## 2024-03-14 DIAGNOSIS — M17.11 PRIMARY OSTEOARTHRITIS OF RIGHT KNEE: Primary | ICD-10-CM

## 2024-03-14 PROCEDURE — 97140 MANUAL THERAPY 1/> REGIONS: CPT | Performed by: PHYSICAL THERAPIST

## 2024-03-14 PROCEDURE — 97110 THERAPEUTIC EXERCISES: CPT | Performed by: PHYSICAL THERAPIST

## 2024-03-14 NOTE — PROGRESS NOTES
"Daily Note     Today's date: 3/14/2024  Patient name: Kaitlynn Madera  : 1985  MRN: [id number]  Referring provider: Emi García PA-C  Dx:   Encounter Diagnosis     ICD-10-CM    1. Primary osteoarthritis of right knee  M17.11       2. Complex tear of medial meniscus of right knee, unspecified whether old or current tear, initial encounter  S83.463U                      Subjective: pt reports compliance with her hep and that she is having no difficulty with it.       Objective: See treatment diary below      Assessment: sig improvements noted with flexion rom compared to LV ,continued limitations in extension. Good tolerance to manual tx. Monitor response and progress as fred NV.        Plan: Continue per plan of care.      Precautions: R (TKA vs unicompartmental), L knee OA      Manuals 3/12 3/14           prom kl kl                                                  Neuro Re-Ed                                                                                                        Ther Ex             Heel slides  5\"x20           Prone TKE             saq             Slr flexion  x10           Cone taps  x20           Step ups  6\"x20           Mini squats  x20           Hr/tr  x20           Ther Activity             bike  8'                        Gait Training                                       Modalities             Cp with extension hang                               "

## 2024-03-19 ENCOUNTER — OFFICE VISIT (OUTPATIENT)
Dept: PHYSICAL THERAPY | Facility: CLINIC | Age: 39
End: 2024-03-19
Payer: COMMERCIAL

## 2024-03-19 DIAGNOSIS — M17.11 PRIMARY OSTEOARTHRITIS OF RIGHT KNEE: Primary | ICD-10-CM

## 2024-03-19 DIAGNOSIS — S83.231A COMPLEX TEAR OF MEDIAL MENISCUS OF RIGHT KNEE, UNSPECIFIED WHETHER OLD OR CURRENT TEAR, INITIAL ENCOUNTER: ICD-10-CM

## 2024-03-19 DIAGNOSIS — M25.561 ACUTE PAIN OF RIGHT KNEE: ICD-10-CM

## 2024-03-19 DIAGNOSIS — S83.231D COMPLEX TEAR OF MEDIAL MENISCUS OF RIGHT KNEE AS CURRENT INJURY, SUBSEQUENT ENCOUNTER: ICD-10-CM

## 2024-03-19 PROCEDURE — 97110 THERAPEUTIC EXERCISES: CPT

## 2024-03-19 PROCEDURE — 97140 MANUAL THERAPY 1/> REGIONS: CPT

## 2024-03-19 NOTE — PROGRESS NOTES
"Daily Note     Today's date: 3/19/2024  Patient name: Kaitlynn Madera  : 1985  MRN: [id number]  Referring provider: Emi García PA-C  Dx:   Encounter Diagnosis     ICD-10-CM    1. Primary osteoarthritis of right knee  M17.11       2. Complex tear of medial meniscus of right knee, unspecified whether old or current tear, initial encounter  S83.231A       3. Complex tear of medial meniscus of right knee as current injury, subsequent encounter  S83.231D       4. Acute pain of right knee  M25.561           Start Time: 1100  Stop Time: 1154  Total time in clinic (min): 54 minutes      Subjective: No significant changes to report.      Objective: See treatment diary below.      Assessment: Noted mild instability in the right knee when performing additional lateral step ups.  Right knee ROM improving.       Plan: Continue treatment as per PT plan of care.       Precautions: R (TKA vs unicompartmental), L knee OA      Manuals 3/12 3/14 3/19          prom kl kl JLW                                                 Neuro Re-Ed                                                                                                        Ther Ex             HS stretch   30\"x4          Heel slides  5\"x20 5\"x20          Prone TKE   5\"x20          saq   5\"x20          Slr flexion  x10 2x10          Cone taps  x20 20 ea          Step ups  6\"x20 fwd, lat  6\"  20 ea          Mini squats  x20 20          Hr/tr  x20 20 ea                                                 Ther Activity             bike  8' 8'                       Gait Training                                       Modalities             CP post tx   8'                              "

## 2024-03-22 ENCOUNTER — OFFICE VISIT (OUTPATIENT)
Dept: PHYSICAL THERAPY | Facility: CLINIC | Age: 39
End: 2024-03-22
Payer: COMMERCIAL

## 2024-03-22 ENCOUNTER — OFFICE VISIT (OUTPATIENT)
Dept: OBGYN CLINIC | Facility: CLINIC | Age: 39
End: 2024-03-22

## 2024-03-22 ENCOUNTER — APPOINTMENT (OUTPATIENT)
Dept: RADIOLOGY | Facility: CLINIC | Age: 39
End: 2024-03-22
Payer: COMMERCIAL

## 2024-03-22 VITALS
DIASTOLIC BLOOD PRESSURE: 80 MMHG | SYSTOLIC BLOOD PRESSURE: 140 MMHG | BODY MASS INDEX: 35.97 KG/M2 | HEIGHT: 63 IN | WEIGHT: 203 LBS

## 2024-03-22 DIAGNOSIS — M17.12 PRIMARY OSTEOARTHRITIS OF LEFT KNEE: ICD-10-CM

## 2024-03-22 DIAGNOSIS — Z96.651 AFTERCARE FOLLOWING RIGHT KNEE JOINT REPLACEMENT SURGERY: Primary | ICD-10-CM

## 2024-03-22 DIAGNOSIS — Z47.1 AFTERCARE FOLLOWING RIGHT KNEE JOINT REPLACEMENT SURGERY: Primary | ICD-10-CM

## 2024-03-22 DIAGNOSIS — Z47.1 AFTERCARE FOLLOWING RIGHT KNEE JOINT REPLACEMENT SURGERY: ICD-10-CM

## 2024-03-22 DIAGNOSIS — S83.231D COMPLEX TEAR OF MEDIAL MENISCUS OF RIGHT KNEE AS CURRENT INJURY, SUBSEQUENT ENCOUNTER: ICD-10-CM

## 2024-03-22 DIAGNOSIS — M17.11 PRIMARY OSTEOARTHRITIS OF RIGHT KNEE: Primary | ICD-10-CM

## 2024-03-22 DIAGNOSIS — S83.231A COMPLEX TEAR OF MEDIAL MENISCUS OF RIGHT KNEE, UNSPECIFIED WHETHER OLD OR CURRENT TEAR, INITIAL ENCOUNTER: ICD-10-CM

## 2024-03-22 DIAGNOSIS — M17.11 PRIMARY OSTEOARTHRITIS OF RIGHT KNEE: ICD-10-CM

## 2024-03-22 DIAGNOSIS — Z96.651 AFTERCARE FOLLOWING RIGHT KNEE JOINT REPLACEMENT SURGERY: ICD-10-CM

## 2024-03-22 PROCEDURE — 99024 POSTOP FOLLOW-UP VISIT: CPT | Performed by: STUDENT IN AN ORGANIZED HEALTH CARE EDUCATION/TRAINING PROGRAM

## 2024-03-22 PROCEDURE — 97140 MANUAL THERAPY 1/> REGIONS: CPT

## 2024-03-22 PROCEDURE — 73562 X-RAY EXAM OF KNEE 3: CPT

## 2024-03-22 PROCEDURE — 97110 THERAPEUTIC EXERCISES: CPT

## 2024-03-22 RX ORDER — DICLOFENAC SODIUM 75 MG/1
75 TABLET, DELAYED RELEASE ORAL 2 TIMES DAILY
Qty: 60 TABLET | Refills: 1 | Status: SHIPPED | OUTPATIENT
Start: 2024-03-22

## 2024-03-22 NOTE — PROGRESS NOTES
Subjective: Patient seen and examined. Pain controlled. Progressing well. Incision without drainage, mepilex dressing intact. Denies fevers or chills.  She is ambulating with an SPC, but typically only using it for stairs.    Physical Exam:  Incision: CDI, no erythema or drainage noted  ROM: 0-105 limited by soft tissues  5/5 IP/Q/HS/TA/GS, 2+ DP/PT, SILT DP/SP/S/S/TN    XR Right knee: s/p cemented medial UKA, components in good position. No fracture or dislocation.    Assessment/Plan:  40 y/o female doing well 2 weeks s/p Right medial UKA from 3/7/24.    - continue multi-modal pain control   - Weight bearing status: WBAT RLE  - DVT ppx: aspirin 81mg twice daily x 4 weeks  - Incision care: May shower, do not scrub or submerge incision  - PT/OT  - F/U 4 weeks      Scribe Attestation      I,:  Emi García PA-C am acting as a scribe while in the presence of the attending physician.:       I,:  León Gray DO personally performed the services described in this documentation    as scribed in my presence.:

## 2024-03-26 ENCOUNTER — OFFICE VISIT (OUTPATIENT)
Dept: PHYSICAL THERAPY | Facility: CLINIC | Age: 39
End: 2024-03-26
Payer: COMMERCIAL

## 2024-03-26 DIAGNOSIS — S83.231A COMPLEX TEAR OF MEDIAL MENISCUS OF RIGHT KNEE, UNSPECIFIED WHETHER OLD OR CURRENT TEAR, INITIAL ENCOUNTER: ICD-10-CM

## 2024-03-26 DIAGNOSIS — M17.11 PRIMARY OSTEOARTHRITIS OF RIGHT KNEE: Primary | ICD-10-CM

## 2024-03-26 DIAGNOSIS — S83.231D COMPLEX TEAR OF MEDIAL MENISCUS OF RIGHT KNEE AS CURRENT INJURY, SUBSEQUENT ENCOUNTER: ICD-10-CM

## 2024-03-26 DIAGNOSIS — M25.561 ACUTE PAIN OF RIGHT KNEE: ICD-10-CM

## 2024-03-26 PROCEDURE — 97110 THERAPEUTIC EXERCISES: CPT

## 2024-03-26 PROCEDURE — 97140 MANUAL THERAPY 1/> REGIONS: CPT

## 2024-03-26 NOTE — PROGRESS NOTES
"Daily Note     Today's date: 3/26/2024  Patient name: Kaitlynn Madera  : 1985  MRN: [id number]  Referring provider: Emi García PA-C  Dx:   Encounter Diagnosis     ICD-10-CM    1. Primary osteoarthritis of right knee  M17.11       2. Complex tear of medial meniscus of right knee, unspecified whether old or current tear, initial encounter  S83.231A       3. Complex tear of medial meniscus of right knee as current injury, subsequent encounter  S83.231D       4. Acute pain of right knee  M25.561           Start Time: 1100  Stop Time: 1151  Total time in clinic (min): 51 minutes      Subjective: Patient reports she noticed discomfort in her right knee when getting into bed the other night.  Patient reports feeling comfortable walking into the clinic today without the SPC.      Objective: See treatment diary below.      Assessment: Patient is doing well with progression of TE program.  Right knee ROM and strength improving.      Plan: Continue treatment as per PT plan of care.       Precautions: R (TKA vs unicompartmental), L knee OA      Manuals 3/12 3/14 3/19 3/22 3/26        prom kl kl JLW JLW JLW                                               Neuro Re-Ed                                                                                                        Ther Ex             HS stretch   30\"x4 30\"x4 30\"x4        Prone quad stretch     30\"x4        Heel slides  5\"x20 5\"x20 5\"x20         Prone TKE   5\"x20 5\"x20 5\"x20        saq   5\"x20 5\"x20 5\"x20        Slr flexion  x10 2x10 20 20        Cone taps  x20 20 ea on airex  20 ea on airex  20 ea        Step ups  6\"x20 fwd, lat  6\"  20 ea fwd, lat  6\"  20 ea fwd, lat  8\"  20 ea        Mini squats  x20 20 20 20        Hr/tr  x20 20 ea 30 ea 30 ea        leg press     30#   20  40#   20        sidestepping     blue   12ft x8 blue   12ft x8                                  Ther Activity             bike  8' 8' 8' 8'                     Gait Training                 "                       Modalities             CP post tx   8' 8' 10'

## 2024-03-28 ENCOUNTER — TELEPHONE (OUTPATIENT)
Dept: OBGYN CLINIC | Facility: CLINIC | Age: 39
End: 2024-03-28

## 2024-03-28 ENCOUNTER — TELEPHONE (OUTPATIENT)
Age: 39
End: 2024-03-28

## 2024-03-28 NOTE — TELEPHONE ENCOUNTER
Caller: Patient    Doctor: Isaac /Joseph    Reason for call: Was supposed to discuss scheduling Left Knee Surgery at her next follow up appt, but her husbands job needs to update the schedule so she wanted to know if she would be able to start the process of scheduling that  sx now instead.-    Call back#: [phone]

## 2024-03-28 NOTE — TELEPHONE ENCOUNTER
Monserrat Weaver's phone call set a hold date in calendar for Oct 17 for other knee. And Scheduled an appt to come in to discuss left knee SX and sign consents.

## 2024-03-29 ENCOUNTER — OFFICE VISIT (OUTPATIENT)
Dept: PHYSICAL THERAPY | Facility: CLINIC | Age: 39
End: 2024-03-29
Payer: COMMERCIAL

## 2024-03-29 DIAGNOSIS — M25.561 ACUTE PAIN OF RIGHT KNEE: ICD-10-CM

## 2024-03-29 DIAGNOSIS — S83.231D COMPLEX TEAR OF MEDIAL MENISCUS OF RIGHT KNEE AS CURRENT INJURY, SUBSEQUENT ENCOUNTER: ICD-10-CM

## 2024-03-29 DIAGNOSIS — S83.231A COMPLEX TEAR OF MEDIAL MENISCUS OF RIGHT KNEE, UNSPECIFIED WHETHER OLD OR CURRENT TEAR, INITIAL ENCOUNTER: ICD-10-CM

## 2024-03-29 DIAGNOSIS — M17.11 PRIMARY OSTEOARTHRITIS OF RIGHT KNEE: Primary | ICD-10-CM

## 2024-03-29 PROCEDURE — 97530 THERAPEUTIC ACTIVITIES: CPT

## 2024-03-29 PROCEDURE — 97110 THERAPEUTIC EXERCISES: CPT

## 2024-03-29 NOTE — PROGRESS NOTES
Daily Note     Today's date: 3/29/2024  Patient name: Kaitlynn Madera  : 1985  MRN: [id number]  Referring provider: Emi García PA-C  Dx:   Encounter Diagnosis     ICD-10-CM    1. Primary osteoarthritis of right knee  M17.11       2. Complex tear of medial meniscus of right knee, unspecified whether old or current tear, initial encounter  S83.231A       3. Complex tear of medial meniscus of right knee as current injury, subsequent encounter  S83.231D       4. Acute pain of right knee  M25.561           Start Time: 1118  Stop Time: 1205  Total time in clinic (min): 47 minutes    Subjective: Reports that she is feeling a lot better. Noting improvements with her motion. Ambulatory without SPC to today's session.       Objective: See treatment diary below      Assessment: Tolerated treatment well.Continued per outlined POC. Meg seems to be doing very well, improving in motion, strength, and endurance. Flexion at 112 solid progression at this pont following surgery. Good tolerance to upright weightbearing activities. Cuing for quad set prior to SLR to increase quad recruitment and reduce extensor lag. Overall Meg seems to be doing very well to this point with her progress and recovery. Has been compliant with HEP. No increased pain throughout, reported good muscular effort during single leg leg press. Good quad recruitment for standing TKE with blue band. Patient demonstrated fatigue post treatment, exhibited good technique with therapeutic exercises, and would benefit from continued PT      Plan: Continue per plan of care.  Progress treatment as tolerated.       Precautions: R (TKA vs unicompartmental), L knee OA      Manuals 3/12 3/14 3/19 3/22 3/26 3/29        prom kl kl JLW JLW JLW MH     Ext -3 AROM, +1 PROM  Flex 112 AAROM                                              Neuro Re-Ed                                                                                                        Ther Ex            "  HS stretch   30\"x4 30\"x4 30\"x4 30\"x4       Prone quad stretch     30\"x4 30\"x4       Heel slides  5\"x20 5\"x20 5\"x20  20x5\"        Prone TKE   5\"x20 5\"x20 5\"x20 20x5\"        saq   5\"x20 5\"x20 5\"x20 20x5\" 1#        Slr flexion  x10 2x10 20 20 20 c quad set        Cone taps  x20 20 ea on airex  20 ea on airex  20 ea On airex 20 ea        Step ups  6\"x20 fwd, lat  6\"  20 ea fwd, lat  6\"  20 ea fwd, lat  8\"  20 ea        Mini squats  x20 20 20 20        Hr/tr  x20 20 ea 30 ea 30 ea        leg press     30#   20  40#   20 SL #30 x20        sidestepping     blue   12ft x8 blue   12ft x8 Blue 12ft x4 laps        Standing TKE       Blue TB 10x3\"        LAQ       1# 2x10x3\"                                              Ther Activity             bike  8' 8' 8' 8' 8'                    Gait Training                                       Modalities             CP post tx   8' 8' 10' 10'                             "

## 2024-04-02 ENCOUNTER — OFFICE VISIT (OUTPATIENT)
Dept: PHYSICAL THERAPY | Facility: CLINIC | Age: 39
End: 2024-04-02
Payer: COMMERCIAL

## 2024-04-02 DIAGNOSIS — S83.231A COMPLEX TEAR OF MEDIAL MENISCUS OF RIGHT KNEE, UNSPECIFIED WHETHER OLD OR CURRENT TEAR, INITIAL ENCOUNTER: ICD-10-CM

## 2024-04-02 DIAGNOSIS — S83.231D COMPLEX TEAR OF MEDIAL MENISCUS OF RIGHT KNEE AS CURRENT INJURY, SUBSEQUENT ENCOUNTER: ICD-10-CM

## 2024-04-02 DIAGNOSIS — M17.11 PRIMARY OSTEOARTHRITIS OF RIGHT KNEE: Primary | ICD-10-CM

## 2024-04-02 DIAGNOSIS — M25.561 ACUTE PAIN OF RIGHT KNEE: ICD-10-CM

## 2024-04-02 PROCEDURE — 97140 MANUAL THERAPY 1/> REGIONS: CPT

## 2024-04-02 PROCEDURE — 97110 THERAPEUTIC EXERCISES: CPT

## 2024-04-02 NOTE — PROGRESS NOTES
"Daily Note     Today's date: 2024  Patient name: Kaitlynn Madera  : 1985  MRN: [id number]  Referring provider: Emi García PA-C  Dx:   Encounter Diagnosis     ICD-10-CM    1. Primary osteoarthritis of right knee  M17.11       2. Complex tear of medial meniscus of right knee, unspecified whether old or current tear, initial encounter  S83.231A       3. Complex tear of medial meniscus of right knee as current injury, subsequent encounter  S83.231D       4. Acute pain of right knee  M25.561           Start Time: 1056  Stop Time: 1150  Total time in clinic (min): 54 minutes    Subjective: Patient reports her knee is feeling stiff this AM. Otherwise, patient offers no new complaints/changes since last visit.       Objective: See treatment diary below      Assessment: Patient tolerated treatment session well. Treatment session consisted of improving R knee strength, stability, and ROM as noted below. Initiated warm-up on bike for ROM. Patient is making excellent progress with knee ROM in this stage of recovery. Mild discomfort with knee ext stretch however, tolerates moderate OP. Able to initiate 3-way SLR, which demonstrates an improvement in multiplanar quad and hip girdle endurance. Good tolerance to wt progressions as she completed TE with good quad contraction and no ext lag exhibited. Ended session with CP to modulate pain and reduce effects of DOMs. Patient would benefit from continued PT. Patient billed for 1:1 time with patient from [id number].      Plan: Continue per POC. Increase reps/resistance as tolerated.      Precautions: R (TKA vs unicompartmental), L knee OA      Manuals 3/12 3/14 3/19 3/22 3/26 3/29  4/2      prom kl kl JLW JLW JLW MH     Ext -3 AROM, +1 PROM  Flex 112 AAROM MS                                             Neuro Re-Ed                                                                                                        Ther Ex             HS stretch   30\"x4 30\"x4 30\"x4 " "30\"x4 30\"x4      Prone quad stretch     30\"x4 30\"x4 30\"x4      Heel slides  5\"x20 5\"x20 5\"x20  20x5\"  20x5\"       Prone TKE   5\"x20 5\"x20 5\"x20 20x5\"        saq   5\"x20 5\"x20 5\"x20 20x5\" 1#  20x5\" 1#       Slr flexion  x10 2x10 20 20 20 c quad set  1# x 20      Cone taps  x20 20 ea on airex  20 ea on airex  20 ea On airex 20 ea  On airex 20 ea       Step ups  6\"x20 fwd, lat  6\"  20 ea fwd, lat  6\"  20 ea fwd, lat  8\"  20 ea  fwd, lat  8\"  20 ea      Mini squats  x20 20 20 20        Hr/tr  x20 20 ea 30 ea 30 ea        leg press     30#   20  40#   20 SL #30 x20  SL #30 x20       sidestepping     blue   12ft x8 blue   12ft x8 Blue 12ft x4 laps        Standing TKE       Blue TB 10x3\"  Blue TB 10x3\"       LAQ       1# 2x10x3\" 1# 2x10x3\"      3-way SLR       Blue x 15 ea                                 Ther Activity             bike  8' 8' 8' 8' 8' 8'                   Gait Training                                       Modalities             CP post tx   8' 8' 10' 10' 10'                              "

## 2024-04-04 ENCOUNTER — OFFICE VISIT (OUTPATIENT)
Dept: PHYSICAL THERAPY | Facility: CLINIC | Age: 39
End: 2024-04-04
Payer: COMMERCIAL

## 2024-04-04 DIAGNOSIS — M17.11 PRIMARY OSTEOARTHRITIS OF RIGHT KNEE: Primary | ICD-10-CM

## 2024-04-04 PROCEDURE — 97530 THERAPEUTIC ACTIVITIES: CPT | Performed by: PHYSICAL THERAPIST

## 2024-04-04 PROCEDURE — 97110 THERAPEUTIC EXERCISES: CPT | Performed by: PHYSICAL THERAPIST

## 2024-04-04 NOTE — PROGRESS NOTES
"Daily Note     Today's date: 2024  Patient name: Kaitlynn Madera  : 1985  MRN: [id number]  Referring provider: Emi García PA-C  Dx:   Encounter Diagnosis     ICD-10-CM    1. Primary osteoarthritis of right knee  M17.11                      Subjective: Patient reports her knee is feeling stiff, however she reports more pain in the uninvolved side. Otherwise, patient offers no new complaints/changes since last visit.       Objective: See treatment diary below      Assessment: Patient tolerated treatment session well. Treatment session consisted of improving R knee strength, and stability. Patient has reached full range of ROM in knee flexion and extension. Mild discomfort with new progression of lunges, but is able to perform okay with minimal cuing and shakiness as more weight was put onto the involved leg. Patient demonstrated no extensor lag and was able to progress to wt progressions. Did not end the session with CP to modulate pain because patient reported feeling good. Patient would benefit from continued PT.     Plan: Continue per POC. Increase reps/resistance as tolerated.      Precautions: R (TKA vs unicompartmental), L knee OA      Manuals 3/12 3/14 3/19 3/22 3/26 3/29  4/2 4/4     prom kl kl JLW JLW JLW MH     Ext -3 AROM, +1 PROM  Flex 112 AAROM MS MS                                            Neuro Re-Ed                                                                                                        Ther Ex             HS stretch   30\"x4 30\"x4 30\"x4 30\"x4 30\"x4 30\"x4      Prone quad stretch     30\"x4 30\"x4 30\"x4 30\"x4     Heel slides  5\"x20 5\"x20 5\"x20  20x5\"  20x5\"  20x5\"     Prone TKE   5\"x20 5\"x20 5\"x20 20x5\"        saq   5\"x20 5\"x20 5\"x20 20x5\" 1#  20x5\" 1#       Slr flexion  x10 2x10 20 20 20 c quad set  1# x 20 2# x 20     Cone taps  x20 20 ea on airex  20 ea on airex  20 ea On airex 20 ea  On airex 20 ea  On airex 20 ea      Step ups  6\"x20 fwd, lat  6\"  20 ea fwd, " "lat  6\"  20 ea fwd, lat  8\"  20 ea  fwd, lat  8\"  20 ea Fwd, lat 8\" 20 ea      Mini squats  x20 20 20 20        Hr/tr  x20 20 ea 30 ea 30 ea        leg press     30#   20  40#   20 SL #30 x20  SL #30 x20  SL #30 x20     sidestepping     blue   12ft x8 blue   12ft x8 Blue 12ft x4 laps   Blue 12ft x8     Standing TKE       Blue TB 10x3\"  Blue TB 10x3\"       LAQ       1# 2x10x3\" 1# 2x10x3\"      3-way SLR       Blue x 15 ea                                 Ther Activity             bike  8' 8' 8' 8' 8' 8' 8'                  Gait Training                                       Modalities             CP post tx   8' 8' 10' 10' 10'                              "

## 2024-04-09 ENCOUNTER — APPOINTMENT (OUTPATIENT)
Dept: PHYSICAL THERAPY | Facility: CLINIC | Age: 39
End: 2024-04-09
Payer: COMMERCIAL

## 2024-04-10 ENCOUNTER — OFFICE VISIT (OUTPATIENT)
Dept: PHYSICAL THERAPY | Facility: CLINIC | Age: 39
End: 2024-04-10
Payer: COMMERCIAL

## 2024-04-10 DIAGNOSIS — S83.231A COMPLEX TEAR OF MEDIAL MENISCUS OF RIGHT KNEE, UNSPECIFIED WHETHER OLD OR CURRENT TEAR, INITIAL ENCOUNTER: ICD-10-CM

## 2024-04-10 DIAGNOSIS — M25.561 ACUTE PAIN OF RIGHT KNEE: ICD-10-CM

## 2024-04-10 DIAGNOSIS — M17.11 PRIMARY OSTEOARTHRITIS OF RIGHT KNEE: Primary | ICD-10-CM

## 2024-04-10 DIAGNOSIS — S83.231D COMPLEX TEAR OF MEDIAL MENISCUS OF RIGHT KNEE AS CURRENT INJURY, SUBSEQUENT ENCOUNTER: ICD-10-CM

## 2024-04-10 PROCEDURE — 97110 THERAPEUTIC EXERCISES: CPT

## 2024-04-10 PROCEDURE — 97530 THERAPEUTIC ACTIVITIES: CPT

## 2024-04-10 NOTE — PROGRESS NOTES
Daily Note     Today's date: 4/10/2024  Patient name: Kaitlynn Madera  : 1985  MRN: [id number]  Referring provider: Emi García PA-C  Dx:   Encounter Diagnosis     ICD-10-CM    1. Primary osteoarthritis of right knee  M17.11       2. Complex tear of medial meniscus of right knee, unspecified whether old or current tear, initial encounter  S83.231A       3. Complex tear of medial meniscus of right knee as current injury, subsequent encounter  S83.231D       4. Acute pain of right knee  M25.561           Start Time: 1107  Stop Time: 1151  Total time in clinic (min): 44 minutes    Subjective: Patient reports going on a trip to [place] this weekend where she had to do a lot of walking. Notes R knee felt great while walking up/down hills however, was moderately sore afterwards as expected.       Objective: See treatment diary below      Assessment: Patient tolerated treatment session well. Hard end feel at approximately 120* of knee flexion however, no pain or discomfort reported. Progressed program to challenge strength, stability, and balance as patient is responding well to all previous interventions. Fatigue and slight unsteadiness observed while performing these progressions however, was overall tolerable for patient. Excellent eccentric control of RLE displayed while performing step downs. Informed patient to anticipate soreness with progress. Patient making good progress in regards to strength/flexibility and would benefit from continued PT to return to PLOF.       Plan: Continue per POC. Increase reps/resistance as tolerated.      Precautions: R (TKA vs unicompartmental), L knee OA      Manuals 3/12 3/14 3/19 3/22 3/26 3/29  4/2 4/4 4/10    prom kl kl JLW JLW JLW MH     Ext -3 AROM, +1 PROM  Flex 112 AAROM MS MS MS                                           Neuro Re-Ed             SLS                                                                                           Ther Ex            "  HS stretch   30\"x4 30\"x4 30\"x4 30\"x4 30\"x4 30\"x4  30\"x4    Prone quad stretch     30\"x4 30\"x4 30\"x4 30\"x4 30\"x4    Heel slides  5\"x20 5\"x20 5\"x20  20x5\"  20x5\"  20x5\" Knee flexion str @ step 10\"x10    Prone TKE   5\"x20 5\"x20 5\"x20 20x5\"        saq   5\"x20 5\"x20 5\"x20 20x5\" 1#  20x5\" 1#       Slr flexion  x10 2x10 20 20 20 c quad set  1# x 20 2# x 20 2# x 20    Cone taps  x20 20 ea on airex  20 ea on airex  20 ea On airex 20 ea  On airex 20 ea  On airex 20 ea      Step ups  6\"x20 fwd, lat  6\"  20 ea fwd, lat  6\"  20 ea fwd, lat  8\"  20 ea  fwd, lat  8\"  20 ea Fwd, lat 8\" 20 ea  Bosu 2x10 ea    Mini squats  x20 20 20 20        Hr/tr  x20 20 ea 30 ea 30 ea        leg press     30#   20  40#   20 SL #30 x20  SL #30 x20  SL #30 x20 SL #40 x 15    sidestepping     blue   12ft x8 blue   12ft x8 Blue 12ft x4 laps   Blue 12ft x8     Standing TKE       Blue TB 10x3\"  Blue TB 10x3\"   [place] #10.6 x 15    LAQ       1# 2x10x3\" 1# 2x10x3\"      3-way SLR       Blue x 15 ea       Step downs         Fwd 4\" x 10                 Ther Activity             bike  8' 8' 8' 8' 8' 8' 8' 8'                 Gait Training                                       Modalities             CP post tx   8' 8' 10' 10' 10'                                "

## 2024-04-11 DIAGNOSIS — R11.0 NAUSEA: ICD-10-CM

## 2024-04-11 RX ORDER — ONDANSETRON 4 MG/1
4 TABLET, FILM COATED ORAL EVERY 8 HOURS PRN
Qty: 20 TABLET | Refills: 0 | Status: SHIPPED | OUTPATIENT
Start: 2024-04-11

## 2024-04-12 ENCOUNTER — OFFICE VISIT (OUTPATIENT)
Dept: PHYSICAL THERAPY | Facility: CLINIC | Age: 39
End: 2024-04-12
Payer: COMMERCIAL

## 2024-04-12 DIAGNOSIS — S83.231D COMPLEX TEAR OF MEDIAL MENISCUS OF RIGHT KNEE AS CURRENT INJURY, SUBSEQUENT ENCOUNTER: ICD-10-CM

## 2024-04-12 DIAGNOSIS — M17.11 PRIMARY OSTEOARTHRITIS OF RIGHT KNEE: Primary | ICD-10-CM

## 2024-04-12 DIAGNOSIS — S83.231A COMPLEX TEAR OF MEDIAL MENISCUS OF RIGHT KNEE, UNSPECIFIED WHETHER OLD OR CURRENT TEAR, INITIAL ENCOUNTER: ICD-10-CM

## 2024-04-12 PROCEDURE — 97110 THERAPEUTIC EXERCISES: CPT | Performed by: PHYSICAL THERAPIST

## 2024-04-12 PROCEDURE — 97140 MANUAL THERAPY 1/> REGIONS: CPT | Performed by: PHYSICAL THERAPIST

## 2024-04-12 NOTE — PROGRESS NOTES
"Daily Note     Today's date: 2024  Patient name: Kaitlynn Madera  : 1985  MRN: [id number]  Referring provider: Emi García PA-C  Dx:   Encounter Diagnosis     ICD-10-CM    1. Primary osteoarthritis of right knee  M17.11       2. Complex tear of medial meniscus of right knee, unspecified whether old or current tear, initial encounter  S83.231A       3. Complex tear of medial meniscus of right knee as current injury, subsequent encounter  S83.231D                        Subjective: Patient reports that she is improving but continues to feel weak when walking up/down hills. Reports slight soreness behind her knee prior to tx.     Objective: See treatment diary below      Assessment: no additional tenderness noted along posterior structures.  Added therex as listed. She reports fatigue with the progression but no sig pain. Rom is steadily improving. Continue to progress as fred with goal of possible DC in 2 weeks.       Plan: Continue per POC. Increase reps/resistance as tolerated.      Precautions: R (TKA vs unicompartmental), L knee OA      Manuals 3/12 3/14 3/19 3/22 3/26 3/29  4/2 4/4 4/10 4/12   prom kl kl JLW JLW JLW MH     Ext -3 AROM, +1 PROM  Flex 112 AAROM MS MS MS kl                                          Neuro Re-Ed             SLS                                                                                           Ther Ex             HS stretch   30\"x4 30\"x4 30\"x4 30\"x4 30\"x4 30\"x4  30\"x4 30\"x4   Prone quad stretch     30\"x4 30\"x4 30\"x4 30\"x4 30\"x4 30\"x4   Heel slides  5\"x20 5\"x20 5\"x20  20x5\"  20x5\"  20x5\" Knee flexion str @ step 10\"x10    Prone TKE   5\"x20 5\"x20 5\"x20 20x5\"        saq   5\"x20 5\"x20 5\"x20 20x5\" 1#  20x5\" 1#       Slr flexion  x10 2x10 20 20 20 c quad set  1# x 20 2# x 20 2# x 20    Cone taps  x20 20 ea on airex  20 ea on airex  20 ea On airex 20 ea  On airex 20 ea  On airex 20 ea      Step ups  6\"x20 fwd, lat  6\"  20 ea fwd, lat  6\"  20 ea fwd, lat  8\"  20 ea  fwd, " "lat  8\"  20 ea Fwd, lat 8\" 20 ea  Bosu 2x10 ea    Mini squats  x20 20 20 20        Hr/tr  x20 20 ea 30 ea 30 ea        leg press     30#   20  40#   20 SL #30 x20  SL #30 x20  SL #30 x20 SL #40 x 15 40#x0   sidestepping     blue   12ft x8 blue   12ft x8 Blue 12ft x4 laps   Blue 12ft x8     Standing TKE       Blue TB 10x3\"  Blue TB 10x3\"   [place] #10.6 x 15    LAQ       1# 2x10x3\" 1# 2x10x3\"      3-way SLR       Blue x 15 ea       Step downs         Fwd 4\" x 10 4\"2x10   Prone hip extension          5\" 2x10   Lateral step up and over          Bosu x20   Split squat          x20   sls          Blue foam 30\"x3   Ther Activity             bike  8' 8' 8' 8' 8' 8' 8' 8' 8' lvl 2                Gait Training                                       Modalities             CP post tx   8' 8' 10' 10' 10'                                "

## 2024-04-15 DIAGNOSIS — L23.9 ALLERGIC CONTACT DERMATITIS, UNSPECIFIED TRIGGER: ICD-10-CM

## 2024-04-15 RX ORDER — MOMETASONE FUROATE 1 MG/G
CREAM TOPICAL DAILY
Qty: 45 G | Refills: 3 | Status: SHIPPED | OUTPATIENT
Start: 2024-04-15

## 2024-04-16 ENCOUNTER — OFFICE VISIT (OUTPATIENT)
Dept: PHYSICAL THERAPY | Facility: CLINIC | Age: 39
End: 2024-04-16
Payer: COMMERCIAL

## 2024-04-16 DIAGNOSIS — S83.231A COMPLEX TEAR OF MEDIAL MENISCUS OF RIGHT KNEE, UNSPECIFIED WHETHER OLD OR CURRENT TEAR, INITIAL ENCOUNTER: ICD-10-CM

## 2024-04-16 DIAGNOSIS — M17.11 PRIMARY OSTEOARTHRITIS OF RIGHT KNEE: Primary | ICD-10-CM

## 2024-04-16 DIAGNOSIS — S83.231D COMPLEX TEAR OF MEDIAL MENISCUS OF RIGHT KNEE AS CURRENT INJURY, SUBSEQUENT ENCOUNTER: ICD-10-CM

## 2024-04-16 DIAGNOSIS — M25.561 ACUTE PAIN OF RIGHT KNEE: ICD-10-CM

## 2024-04-16 PROCEDURE — 97110 THERAPEUTIC EXERCISES: CPT

## 2024-04-16 PROCEDURE — 97530 THERAPEUTIC ACTIVITIES: CPT

## 2024-04-16 NOTE — PROGRESS NOTES
"Daily Note     Today's date: 2024  Patient name: Kaitlynn Madera  : 1985  MRN: [id number]  Referring provider: Emi García PA-C  Dx:   Encounter Diagnosis     ICD-10-CM    1. Primary osteoarthritis of right knee  M17.11       2. Complex tear of medial meniscus of right knee as current injury, subsequent encounter  S83.231D       3. Acute pain of right knee  M25.561       4. Complex tear of medial meniscus of right knee, unspecified whether old or current tear, initial encounter  S83.231A           Start Time: 1100  Stop Time: 1145  Total time in clinic (min): 45 minutes      Subjective: Patient states, \"I went to the gym yesterday so I'm probably just a little more sore from that.\"  Patient reports intermittent \"clicking\" in her right knee but reports there is no pain associated with this.  Patient reports she has an appointment on Friday for a possible injection in her left knee.       Objective: See treatment diary below.      Assessment: Patient is doing well with progression of TE program.  Right knee ROM and strength improved.      Plan: Continue treatment as per PT plan of care.  Primary PT will re-evaluate patient next visit for possible discharge.       Precautions: R (TKA vs unicompartmental), L knee OA      Manuals 4/16  3/19 3/22 3/26 3/29  4/2 4/4 4/10 4/12   R knee prom JLW  JLW JLW JLW MH     Ext -3 AROM, +1 PROM  Flex 112 AAROM MS MS MS kl                                          Neuro Re-Ed                                                                                                        Ther Ex             HS stretch   30\"x4 30\"x4 30\"x4 30\"x4 30\"x4 30\"x4  30\"x4 30\"x4   Prone quad stretch 30\"x4    30\"x4 30\"x4 30\"x4 30\"x4 30\"x4 30\"x4   Heel slides   5\"x20 5\"x20  20x5\"  20x5\"  20x5\" Knee flexion str @ step 10\"x10    Prone TKE   5\"x20 5\"x20 5\"x20 20x5\"        saq   5\"x20 5\"x20 5\"x20 20x5\" 1#  20x5\" 1#       Slr flexion 2.5#  20  2x10 20 20 20 c quad set  1# x 20 2# x 20 2# x 20  " "  Cone taps   20 ea on airex  20 ea on airex  20 ea On airex 20 ea  On airex 20 ea  On airex 20 ea      Step ups   fwd, lat  6\"  20 ea fwd, lat  6\"  20 ea fwd, lat  8\"  20 ea  fwd, lat  8\"  20 ea Fwd, lat 8\" 20 ea  Bosu 2x10 ea    Mini squats   20 20 20        Hr/tr   20 ea 30 ea 30 ea        leg press 40#  2x10    30#   20  40#   20 SL #30 x20  SL #30 x20  SL #30 x20 SL #40 x 15 40#x0   sidestepping     blue   12ft x8 blue   12ft x8 Blue 12ft x4 laps   Blue 12ft x8     Standing TKE  jarad  10.7  5\"x20     Blue TB 10x3\"  Blue TB 10x3\"   [place] #10.6 x 15    LAQ       1# 2x10x3\" 1# 2x10x3\"      3-way SLR       Blue x 15 ea       Step downs lateral  4\"  2x10        Fwd 4\" x 10 4\"2x10   Prone hip extension 2x10         5\" 2x10   lateral step up and over bosu  20         Bosu x20   Split squat 20         x20   sls blue foam  30\"x3         Blue foam 30\"x3                             Ther Activity             bike 8'  8' 8' 8' 8' 8' 8' 8' 8' lvl 2                Gait Training                                       Modalities             CP post tx   8' 8' 10' 10' 10'                          "

## 2024-04-18 ENCOUNTER — EVALUATION (OUTPATIENT)
Dept: PHYSICAL THERAPY | Facility: CLINIC | Age: 39
End: 2024-04-18
Payer: COMMERCIAL

## 2024-04-18 DIAGNOSIS — M17.11 PRIMARY OSTEOARTHRITIS OF RIGHT KNEE: Primary | ICD-10-CM

## 2024-04-18 PROCEDURE — 97110 THERAPEUTIC EXERCISES: CPT | Performed by: PHYSICAL THERAPIST

## 2024-04-18 PROCEDURE — 97530 THERAPEUTIC ACTIVITIES: CPT | Performed by: PHYSICAL THERAPIST

## 2024-04-18 NOTE — PROGRESS NOTES
"Daily Note/DC summary    Today's date: 2024  Patient name: Kaitlynn Madera  : 1985  MRN: [id number]  Referring provider: Emi García PA-C  Dx:   Encounter Diagnosis     ICD-10-CM    1. Primary osteoarthritis of right knee  M17.11                        Subjective: Pt reports that she is pleased with her progress and plans to continue her hep and the gym.        Objective: See treatment diary below.      Assessment: Pt rom and strength is WFL      Plan: Pt has achieved a majority of her goals set at the start of therapy, is independent with her hep, and will be Dc'd at this time. She is instructed to call the clinic if she has question with her hep or if symptoms return.  Thank you for this referral.         Precautions: R (TKA vs unicompartmental), L knee OA      Manuals 4/16 4/18 3/19 3/22 3/26 3/29  4/2 4/4 4/10 4/12   R knee prom JLW  JLW JLW JLW MH     Ext -3 AROM, +1 PROM  Flex 112 AAROM MS MS MS kl                                          Neuro Re-Ed                                                                                                        Ther Ex             HS stretch 30\"x4 30\"x4 30\"x4 30\"x4 30\"x4 30\"x4 30\"x4 30\"x4  30\"x4 30\"x4   Prone quad stretch 30\"x4 30\"x4   30\"x4 30\"x4 30\"x4 30\"x4 30\"x4 30\"x4   Heel slides   5\"x20 5\"x20  20x5\"  20x5\"  20x5\" Knee flexion str @ step 10\"x10    Prone TKE   5\"x20 5\"x20 5\"x20 20x5\"        saq   5\"x20 5\"x20 5\"x20 20x5\" 1#  20x5\" 1#       Slr flexion 2.5#  20 2#x30 2x10 20 20 20 c quad set  1# x 20 2# x 20 2# x 20    Cone taps   20 ea on airex  20 ea on airex  20 ea On airex 20 ea  On airex 20 ea  On airex 20 ea      Step ups   fwd, lat  6\"  20 ea fwd, lat  6\"  20 ea fwd, lat  8\"  20 ea  fwd, lat  8\"  20 ea Fwd, lat 8\" 20 ea  Bosu 2x10 ea    Mini squats   20 20 20        Hr/tr   20 ea 30 ea 30 ea        leg press 40#  2x10 40#x20   30#   20  40#   20 SL #30 x20  SL #30 x20  SL #30 x20 SL #40 x 15 40#x0   sidestepping     blue   12ft x8 blue   12ft " "x8 Blue 12ft x4 laps   Blue 12ft x8     Standing TKE  jarad  10.7  5\"x20     Blue TB 10x3\"  Blue TB 10x3\"   Byron #10.6 x 15    LAQ       1# 2x10x3\" 1# 2x10x3\"      3-way SLR       Blue x 15 ea       Step downs lateral  4\"  2x10 4\"x20       Fwd 4\" x 10 4\"2x10   Prone hip extension 2x10         5\" 2x10   lateral step up and over bosu  20 x20        Bosu x20   Split squat 20 x20        x20   sls blue foam  30\"x3 Blue foam 30\"x3        Blue foam 30\"x3                             Ther Activity             bike 8' 8' 8' 8' 8' 8' 8' 8' 8' 8' lvl 2                Gait Training                                       Modalities             CP post tx   8' 8' 10' 10' 10'                          "

## 2024-04-19 ENCOUNTER — OFFICE VISIT (OUTPATIENT)
Dept: OBGYN CLINIC | Facility: CLINIC | Age: 39
End: 2024-04-19
Payer: COMMERCIAL

## 2024-04-19 VITALS
HEIGHT: 63 IN | HEART RATE: 86 BPM | SYSTOLIC BLOOD PRESSURE: 141 MMHG | WEIGHT: 203 LBS | BODY MASS INDEX: 35.97 KG/M2 | DIASTOLIC BLOOD PRESSURE: 99 MMHG

## 2024-04-19 DIAGNOSIS — M17.12 PRIMARY OSTEOARTHRITIS OF LEFT KNEE: Primary | ICD-10-CM

## 2024-04-19 DIAGNOSIS — Z47.1 AFTERCARE FOLLOWING RIGHT KNEE JOINT REPLACEMENT SURGERY: ICD-10-CM

## 2024-04-19 DIAGNOSIS — Z96.651 AFTERCARE FOLLOWING RIGHT KNEE JOINT REPLACEMENT SURGERY: ICD-10-CM

## 2024-04-19 PROCEDURE — 99024 POSTOP FOLLOW-UP VISIT: CPT | Performed by: STUDENT IN AN ORGANIZED HEALTH CARE EDUCATION/TRAINING PROGRAM

## 2024-04-19 PROCEDURE — 20610 DRAIN/INJ JOINT/BURSA W/O US: CPT | Performed by: STUDENT IN AN ORGANIZED HEALTH CARE EDUCATION/TRAINING PROGRAM

## 2024-04-19 RX ORDER — AMOXICILLIN 500 MG/1
CAPSULE ORAL
Qty: 4 CAPSULE | Refills: 5 | Status: SHIPPED | OUTPATIENT
Start: 2024-04-19 | End: 2024-05-19

## 2024-04-19 RX ADMIN — BUPIVACAINE HYDROCHLORIDE 4 ML: 2.5 INJECTION, SOLUTION INFILTRATION; PERINEURAL at 10:15

## 2024-04-19 RX ADMIN — TRIAMCINOLONE ACETONIDE 40 MG: 40 INJECTION, SUSPENSION INTRA-ARTICULAR; INTRAMUSCULAR at 10:15

## 2024-04-19 NOTE — PROGRESS NOTES
Subjective: Patient seen and examined. Pain well-controlled. Progressing well. Incision well healed.  Denies fevers or chills.  She is ambulating unassisted.  She was discharged from PT yesterday.  She is very pleased with her right knee.  She s having increased pain un her left knee.  She states that she tentatively scheduled a Left medial UKA for October, but is interested in proceeding with left knee cortisone injection today.     Physical Exam:  Incision: CDI, well healed  ROM: 0-115   5/5 IP/Q/HS/TA/GS, 2+ DP/PT, SILT DP/SP/S/S/TN      Assessment/Plan:  40 y/o female doing well 6 weeks s/p Right medial UKA from 3/7/24, Left knee osteoarthritis    - continue multi-modal pain control   - Weight bearing status: WBAT RLE  - DVT ppx: aspirin 81mg twice daily x 4 weeks  - Incision care: May shower, do not scrub or submerge incision  - PT/OT  - Left knee CSI performed today for known OA.  She is potentially scheduled for Left medial UKA in October.  We will plan on signing paperwork at her next visit.  - F/U 6 weeks    Large joint arthrocentesis: L knee  Universal Protocol:  Consent: Verbal consent obtained.  Risks and benefits: risks, benefits and alternatives were discussed  Consent given by: patient  Patient understanding: patient states understanding of the procedure being performed  Supporting Documentation  Indications: pain   Procedure Details  Location: knee - L knee  Preparation: Patient was prepped and draped in the usual sterile fashion  Needle size: 22 G  Approach: anterolateral  Medications administered: 4 mL bupivacaine 0.25 %; 40 mg triamcinolone acetonide 40 mg/mL    Patient tolerance: patient tolerated the procedure well with no immediate complications  Dressing:  Sterile dressing applied          Scribe Attestation      I,:  Emi García PA-C am acting as a scribe while in the presence of the attending physician.:       I,:  León Gray DO personally performed the services described in  this documentation    as scribed in my presence.:

## 2024-04-20 RX ORDER — TRIAMCINOLONE ACETONIDE 40 MG/ML
40 INJECTION, SUSPENSION INTRA-ARTICULAR; INTRAMUSCULAR
Status: COMPLETED | OUTPATIENT
Start: 2024-04-19 | End: 2024-04-19

## 2024-04-20 RX ORDER — BUPIVACAINE HYDROCHLORIDE 2.5 MG/ML
4 INJECTION, SOLUTION INFILTRATION; PERINEURAL
Status: COMPLETED | OUTPATIENT
Start: 2024-04-19 | End: 2024-04-19

## 2024-05-16 ENCOUNTER — HOSPITAL ENCOUNTER (OUTPATIENT)
Dept: MAMMOGRAPHY | Facility: MEDICAL CENTER | Age: 39
Discharge: HOME | End: 2024-05-16
Payer: COMMERCIAL

## 2024-05-16 VITALS — HEIGHT: 63 IN | BODY MASS INDEX: 35.97 KG/M2 | WEIGHT: 203 LBS

## 2024-05-16 PROCEDURE — 77067 SCR MAMMO BI INCL CAD: CPT

## 2024-05-16 PROCEDURE — 77063 BREAST TOMOSYNTHESIS BI: CPT

## 2024-05-29 DIAGNOSIS — R11.0 NAUSEA: ICD-10-CM

## 2024-05-30 DIAGNOSIS — G43.909 MIGRAINE WITHOUT STATUS MIGRAINOSUS, NOT INTRACTABLE, UNSPECIFIED MIGRAINE TYPE: ICD-10-CM

## 2024-05-30 RX ORDER — ONDANSETRON 4 MG/1
4 TABLET, FILM COATED ORAL EVERY 8 HOURS PRN
Qty: 20 TABLET | Refills: 0 | Status: SHIPPED | OUTPATIENT
Start: 2024-05-30

## 2024-05-31 ENCOUNTER — PREP FOR PROCEDURE (OUTPATIENT)
Dept: OBGYN CLINIC | Facility: CLINIC | Age: 39
End: 2024-05-31

## 2024-05-31 ENCOUNTER — OFFICE VISIT (OUTPATIENT)
Dept: OBGYN CLINIC | Facility: CLINIC | Age: 39
End: 2024-05-31

## 2024-05-31 VITALS
BODY MASS INDEX: 35.97 KG/M2 | DIASTOLIC BLOOD PRESSURE: 80 MMHG | HEIGHT: 63 IN | HEART RATE: 80 BPM | WEIGHT: 203 LBS | SYSTOLIC BLOOD PRESSURE: 130 MMHG

## 2024-05-31 DIAGNOSIS — Z47.1 AFTERCARE FOLLOWING RIGHT KNEE JOINT REPLACEMENT SURGERY: ICD-10-CM

## 2024-05-31 DIAGNOSIS — Z96.651 AFTERCARE FOLLOWING RIGHT KNEE JOINT REPLACEMENT SURGERY: ICD-10-CM

## 2024-05-31 DIAGNOSIS — M17.12 PRIMARY OSTEOARTHRITIS OF LEFT KNEE: Primary | ICD-10-CM

## 2024-05-31 PROCEDURE — 99024 POSTOP FOLLOW-UP VISIT: CPT | Performed by: STUDENT IN AN ORGANIZED HEALTH CARE EDUCATION/TRAINING PROGRAM

## 2024-05-31 RX ORDER — MULTIVIT-MIN/IRON FUM/FOLIC AC 7.5 MG-4
1 TABLET ORAL DAILY
Qty: 30 TABLET | Refills: 1 | Status: SHIPPED | OUTPATIENT
Start: 2024-05-31

## 2024-05-31 RX ORDER — CHLORHEXIDINE GLUCONATE 40 MG/ML
SOLUTION TOPICAL DAILY PRN
OUTPATIENT
Start: 2024-05-31

## 2024-05-31 RX ORDER — ACETAMINOPHEN 325 MG/1
975 TABLET ORAL ONCE
OUTPATIENT
Start: 2024-05-31 | End: 2024-05-31

## 2024-05-31 RX ORDER — VENLAFAXINE HYDROCHLORIDE 150 MG/1
150 CAPSULE, EXTENDED RELEASE ORAL
Qty: 90 CAPSULE | Refills: 1 | Status: SHIPPED | OUTPATIENT
Start: 2024-05-31

## 2024-05-31 RX ORDER — ASCORBIC ACID 500 MG
500 TABLET ORAL 2 TIMES DAILY
Qty: 60 TABLET | Refills: 1 | Status: SHIPPED | OUTPATIENT
Start: 2024-05-31

## 2024-05-31 RX ORDER — FOLIC ACID 1 MG/1
1 TABLET ORAL DAILY
Qty: 30 TABLET | Refills: 1 | Status: SHIPPED | OUTPATIENT
Start: 2024-05-31

## 2024-05-31 RX ORDER — CHLORHEXIDINE GLUCONATE ORAL RINSE 1.2 MG/ML
15 SOLUTION DENTAL ONCE
OUTPATIENT
Start: 2024-05-31 | End: 2024-05-31

## 2024-05-31 RX ORDER — SODIUM CHLORIDE, SODIUM LACTATE, POTASSIUM CHLORIDE, CALCIUM CHLORIDE 600; 310; 30; 20 MG/100ML; MG/100ML; MG/100ML; MG/100ML
125 INJECTION, SOLUTION INTRAVENOUS CONTINUOUS
OUTPATIENT
Start: 2024-05-31

## 2024-05-31 NOTE — PROGRESS NOTES
Subjective: 39 y.o. female presents to the office 3 months s/p right medial unicompartmental knee arthroplasty performed on 03/07/2024. Patient seen and examined. Essentially no pain. Progressing well and has returned to activities without restrictions. Incision without drainage and well-healed. Denies fevers or chills. She notes that her left knee has increased pain. She is interested in discussing surgical intervention due to the degree of pain.    Physical Exam:  Incision: well healed  ROM: 0-120 without pain  5/5 IP/Q/HS/TA/GS, 2+ DP/PT, SILT DP/SP/S/S/TN    Left Knee:      Inspection:  skin intact    Overall limb alignment varus    Effusion: none    ROM 0-115 with pain medial joint     Extensor Lag: none    Palpation: medial Joint line tenderness to palpation    AP Stability at 90 deg stable    M/L stability in full extension stable    M/L stability in midflexion stable    Motor: 5/5 IP/Q/HS/TA/GS    Pulses: 2+ DP / 2+ PT    SILT DP/SP/S/S/TN    No new imaging obtained today    Assessment/Plan:  3 months s/p right medial unicompartmental knee arthroplasty performed on 03/07/2024 doing excellent  Left knee osteoarthritis    - continue multi-modal pain control   - Weight bearing status: as tolerated  - DVT ppx: completed  - Continue activities as tolerated  - The patient has elected to proceed with left medial unicompartmental knee arthroplasty. Risks and benefits of surgery to include but not limited to bleeding, infection, damage to surrounding structures, hardware failure, instability, fracture, dislocation, need for further surgery, continued pain, stiffness, blood clots, stroke, and heart attack was discussed with the patient. Informed consent was signed today in the office. The patient has met with our surgical schedulers and our preoperative joint replacement pathway has been initiated. All questions were answered. Patient will follow-up 2 weeks post operatively. BMI is appropriate at 35.96 and is a  nonsmoker. Planning for October 2024.  - F/U after surgery for left knee    Scribe Attestation      I,:  Shannon Brennan am acting as a scribe while in the presence of the attending physician.:       I,:  León Gray, DO personally performed the services described in this documentation    as scribed in my presence.:

## 2024-06-17 DIAGNOSIS — G43.909 MIGRAINE WITHOUT STATUS MIGRAINOSUS, NOT INTRACTABLE, UNSPECIFIED MIGRAINE TYPE: ICD-10-CM

## 2024-06-17 RX ORDER — ELETRIPTAN HYDROBROMIDE 40 MG/1
40 TABLET, FILM COATED ORAL ONCE AS NEEDED
Qty: 18 TABLET | Refills: 0 | Status: SHIPPED | OUTPATIENT
Start: 2024-06-17

## 2024-08-03 DIAGNOSIS — R11.0 NAUSEA: ICD-10-CM

## 2024-08-04 RX ORDER — ONDANSETRON 4 MG/1
4 TABLET, FILM COATED ORAL EVERY 8 HOURS PRN
Qty: 20 TABLET | Refills: 2 | Status: SHIPPED | OUTPATIENT
Start: 2024-08-04

## 2024-09-10 ENCOUNTER — TELEPHONE (OUTPATIENT)
Dept: OBGYN CLINIC | Facility: HOSPITAL | Age: 39
End: 2024-09-10

## 2024-09-10 NOTE — TELEPHONE ENCOUNTER
Preoperative Elective Admission Assessment- spoke to patient.     TKA in March 2024- confirmed and updated assessment today.      Living Situation:    Who does pt live with: spouse  What kind of home: multi-level  How many levels in home: 2   # of steps to enter home: 2  # of steps to second floor: 12-14  Are there handrails: Yes  Are there landings: Yes  Sleeping arrangement: second floor  Where is Bathroom: first and second floor  Where is the tub or shower: second floor, walk in shower     First Floor Setup: Previously stayed on 1st floor last surgery.  Is there a bathroom: Yes, 1/2 bath   Where would pt sleep: bed- moving downstairs for surgery    DME:  HAS RW- Instructed to bring RW on DOS.     We discussed clearing pathways in the home and making sure there is accessibly to use the walker, for example, removing throw rugs.      Patient's Current Level of Function: Ambulates: Independently and ADLs: Independent     Post-op Caregiver: spouse  Caregiver Name and phone number for Inpatient discharge needs:     Steve Madera (Spouse)  [phone]       Currently receive any HHC/aides/community supports: No     Post-op Transport: spouse  To/from hospital: spouse  To/from PT 2-3x/week: spouse  Uses community transport now: No     Outpatient Physical Therapy Site:  Site: [place]  pre and post-op appts scheduled? Yes     Medication Management: self  Preferred Pharmacy for Post-op Medications: [facility] - [address] [facility], [[id number]]  Meds to Beds   Blood Management Vitamin Regimen:  Pt confirmed she has preop BM vitamins at home and will begin taking 30 days prior.     Post-op anticoagulant: to be determined by surgical team postoperatively      DC Plan: Pt plans to be discharged home     Barriers to DC identified preoperatively: none identified     BMI: 35.96     Patient Education:  Pt educated on post-op pain, early mobilization (POD0), LOS goals, OP PT goals, and preoperative  bathing. Patient educated that our goal is to appropriately discharge patient based off their post-op function while striving to maintain maximal independence. The goal is to discharge patient to home and for them to attend outpatient physical therapy.     Assigned to care team? Yes     SW referral: n/a

## 2024-09-11 ENCOUNTER — APPOINTMENT (OUTPATIENT)
Dept: LAB | Facility: CLINIC | Age: 39
End: 2024-09-11
Payer: COMMERCIAL

## 2024-09-11 DIAGNOSIS — M17.12 PRIMARY OSTEOARTHRITIS OF LEFT KNEE: ICD-10-CM

## 2024-09-11 LAB
ALBUMIN SERPL BCG-MCNC: 4.5 G/DL (ref 3.5–5)
ALP SERPL-CCNC: 42 U/L (ref 34–104)
ALT SERPL W P-5'-P-CCNC: 17 U/L (ref 7–52)
ANION GAP SERPL CALCULATED.3IONS-SCNC: 9 MMOL/L (ref 4–13)
APTT PPP: 29 SECONDS (ref 23–34)
AST SERPL W P-5'-P-CCNC: 16 U/L (ref 13–39)
BASOPHILS # BLD AUTO: 0.04 THOUSANDS/ΜL (ref 0–0.1)
BASOPHILS NFR BLD AUTO: 1 % (ref 0–1)
BILIRUB SERPL-MCNC: 0.57 MG/DL (ref 0.2–1)
BUN SERPL-MCNC: 17 MG/DL (ref 5–25)
CALCIUM SERPL-MCNC: 9.5 MG/DL (ref 8.4–10.2)
CHLORIDE SERPL-SCNC: 101 MMOL/L (ref 96–108)
CO2 SERPL-SCNC: 29 MMOL/L (ref 21–32)
CREAT SERPL-MCNC: 0.71 MG/DL (ref 0.6–1.3)
EOSINOPHIL # BLD AUTO: 0.14 THOUSAND/ΜL (ref 0–0.61)
EOSINOPHIL NFR BLD AUTO: 2 % (ref 0–6)
ERYTHROCYTE [DISTWIDTH] IN BLOOD BY AUTOMATED COUNT: 13.3 % (ref 11.6–15.1)
EST. AVERAGE GLUCOSE BLD GHB EST-MCNC: 100 MG/DL
GFR SERPL CREATININE-BSD FRML MDRD: 106 ML/MIN/1.73SQ M
GLUCOSE P FAST SERPL-MCNC: 78 MG/DL (ref 65–99)
HBA1C MFR BLD: 5.1 %
HCT VFR BLD AUTO: 44.5 % (ref 34.8–46.1)
HGB BLD-MCNC: 14.5 G/DL (ref 11.5–15.4)
IMM GRANULOCYTES # BLD AUTO: 0.01 THOUSAND/UL (ref 0–0.2)
IMM GRANULOCYTES NFR BLD AUTO: 0 % (ref 0–2)
INR PPP: 0.93 (ref 0.85–1.19)
LYMPHOCYTES # BLD AUTO: 1.83 THOUSANDS/ΜL (ref 0.6–4.47)
LYMPHOCYTES NFR BLD AUTO: 25 % (ref 14–44)
MCH RBC QN AUTO: 28.9 PG (ref 26.8–34.3)
MCHC RBC AUTO-ENTMCNC: 32.6 G/DL (ref 31.4–37.4)
MCV RBC AUTO: 89 FL (ref 82–98)
MONOCYTES # BLD AUTO: 0.39 THOUSAND/ΜL (ref 0.17–1.22)
MONOCYTES NFR BLD AUTO: 5 % (ref 4–12)
NEUTROPHILS # BLD AUTO: 5 THOUSANDS/ΜL (ref 1.85–7.62)
NEUTS SEG NFR BLD AUTO: 67 % (ref 43–75)
NRBC BLD AUTO-RTO: 0 /100 WBCS
PLATELET # BLD AUTO: 387 THOUSANDS/UL (ref 149–390)
PMV BLD AUTO: 10 FL (ref 8.9–12.7)
POTASSIUM SERPL-SCNC: 4.4 MMOL/L (ref 3.5–5.3)
PROT SERPL-MCNC: 7.4 G/DL (ref 6.4–8.4)
PROTHROMBIN TIME: 12.8 SECONDS (ref 12.3–15)
RBC # BLD AUTO: 5.01 MILLION/UL (ref 3.81–5.12)
SODIUM SERPL-SCNC: 139 MMOL/L (ref 135–147)
WBC # BLD AUTO: 7.41 THOUSAND/UL (ref 4.31–10.16)

## 2024-09-11 PROCEDURE — 86850 RBC ANTIBODY SCREEN: CPT | Performed by: NURSE PRACTITIONER

## 2024-09-11 PROCEDURE — 80053 COMPREHEN METABOLIC PANEL: CPT

## 2024-09-11 PROCEDURE — 86900 BLOOD TYPING SEROLOGIC ABO: CPT | Performed by: NURSE PRACTITIONER

## 2024-09-11 PROCEDURE — 36415 COLL VENOUS BLD VENIPUNCTURE: CPT

## 2024-09-11 PROCEDURE — 86901 BLOOD TYPING SEROLOGIC RH(D): CPT | Performed by: NURSE PRACTITIONER

## 2024-09-11 PROCEDURE — 85730 THROMBOPLASTIN TIME PARTIAL: CPT

## 2024-09-11 PROCEDURE — 85610 PROTHROMBIN TIME: CPT

## 2024-09-11 PROCEDURE — 85025 COMPLETE CBC W/AUTO DIFF WBC: CPT

## 2024-09-12 ENCOUNTER — LAB REQUISITION (OUTPATIENT)
Dept: LAB | Facility: HOSPITAL | Age: 39
End: 2024-09-12
Payer: COMMERCIAL

## 2024-09-12 DIAGNOSIS — M17.12 UNILATERAL PRIMARY OSTEOARTHRITIS, LEFT KNEE: ICD-10-CM

## 2024-09-12 LAB
ABO GROUP BLD: NORMAL
BLD GP AB SCN SERPL QL: NEGATIVE
RH BLD: NEGATIVE
SPECIMEN EXPIRATION DATE: NORMAL

## 2024-09-16 DIAGNOSIS — G43.909 MIGRAINE WITHOUT STATUS MIGRAINOSUS, NOT INTRACTABLE, UNSPECIFIED MIGRAINE TYPE: ICD-10-CM

## 2024-09-16 DIAGNOSIS — R11.0 NAUSEA: ICD-10-CM

## 2024-09-18 RX ORDER — VENLAFAXINE HYDROCHLORIDE 150 MG/1
150 CAPSULE, EXTENDED RELEASE ORAL
Qty: 90 CAPSULE | Refills: 1 | Status: SHIPPED | OUTPATIENT
Start: 2024-09-18

## 2024-09-18 RX ORDER — ELETRIPTAN HYDROBROMIDE 40 MG/1
40 TABLET, FILM COATED ORAL ONCE AS NEEDED
Qty: 12 TABLET | Refills: 3 | Status: SHIPPED | OUTPATIENT
Start: 2024-09-18

## 2024-09-18 RX ORDER — ONDANSETRON 4 MG/1
4 TABLET, FILM COATED ORAL EVERY 8 HOURS PRN
Qty: 20 TABLET | Refills: 0 | Status: SHIPPED | OUTPATIENT
Start: 2024-09-18

## 2024-09-19 ENCOUNTER — OFFICE VISIT (OUTPATIENT)
Dept: FAMILY MEDICINE CLINIC | Facility: CLINIC | Age: 39
End: 2024-09-19
Payer: COMMERCIAL

## 2024-09-19 VITALS
WEIGHT: 216.4 LBS | OXYGEN SATURATION: 98 % | HEART RATE: 97 BPM | TEMPERATURE: 98.9 F | BODY MASS INDEX: 38.33 KG/M2 | SYSTOLIC BLOOD PRESSURE: 142 MMHG | DIASTOLIC BLOOD PRESSURE: 84 MMHG

## 2024-09-19 DIAGNOSIS — J02.9 SORE THROAT: ICD-10-CM

## 2024-09-19 DIAGNOSIS — U07.1 COVID-19: Primary | ICD-10-CM

## 2024-09-19 DIAGNOSIS — R52 BODY ACHES: ICD-10-CM

## 2024-09-19 LAB
S PYO AG THROAT QL: NEGATIVE
SARS-COV-2 AG UPPER RESP QL IA: POSITIVE
SL AMB POCT RAPID FLU A: NORMAL
SL AMB POCT RAPID FLU B: NORMAL
VALID CONTROL: ABNORMAL

## 2024-09-19 PROCEDURE — 87880 STREP A ASSAY W/OPTIC: CPT | Performed by: STUDENT IN AN ORGANIZED HEALTH CARE EDUCATION/TRAINING PROGRAM

## 2024-09-19 PROCEDURE — 99213 OFFICE O/P EST LOW 20 MIN: CPT | Performed by: STUDENT IN AN ORGANIZED HEALTH CARE EDUCATION/TRAINING PROGRAM

## 2024-09-19 PROCEDURE — 87804 INFLUENZA ASSAY W/OPTIC: CPT | Performed by: STUDENT IN AN ORGANIZED HEALTH CARE EDUCATION/TRAINING PROGRAM

## 2024-09-19 PROCEDURE — 87811 SARS-COV-2 COVID19 W/OPTIC: CPT | Performed by: STUDENT IN AN ORGANIZED HEALTH CARE EDUCATION/TRAINING PROGRAM

## 2024-09-19 NOTE — PROGRESS NOTES
Ambulatory Visit  Name: Kaitlynn Madera      : 1985      MRN: [id number]  Encounter Provider: Beverley Moore MD  Encounter Date: 2024   Encounter department: [facility]    Assessment & Plan  Body aches    Orders:    POCT Rapid Covid Ag    POCT rapid flu A and B    Sore throat    Orders:    POCT rapid ANTIGEN strepA    COVID-19  Discussed with pt rapid covid in office positive  Symptomatic tx recommended- increase hydration and dayquil/nyquil as tolerated           History of Present Illness     Meg is a 39 yr old female who presents to the office with sore throat, fatigue, congestion and fever tmax 102.4F. Patient reports symptoms with some improvement today however continues to feel tired and congested. Her kids were sick recently.   Has been taking over the counter dayquil/nyquil for relief.       History obtained from : patient  Review of Systems   Constitutional:  Positive for chills and fatigue. Negative for appetite change.   HENT:  Positive for congestion and sore throat.    Respiratory:  Positive for cough. Negative for chest tightness and shortness of breath.    Neurological:  Negative for dizziness, light-headedness and headaches.     Past Medical History   Past Medical History:   Diagnosis Date    Anemia     hx of anemia - per pt was told with trying to donate blood    Anesthesia     24 per pt - reports with epidural use - pt experienced big BP drop    Anxiety     Blood type, Rh negative     Resolved 2016     Kidney stone     Knee pain, right     Migraine     Obesity     PONV (postoperative nausea and vomiting)      Past Surgical History:   Procedure Laterality Date    EAR SURGERY      Last impression 2015     MYRINGOTOMY      UT ARTHRP KNEE CONDYLE&PLATEAU MEDIAL/LAT CMPRT Right 3/7/2024    Procedure: ARTHROPLASTY KNEE UNICOMPARTMENTAL;  Surgeon: León Gray DO;  Location: [facility];  Service: Orthopedics    TONSILLECTOMY       WISDOM TOOTH EXTRACTION       Family History   Problem Relation Age of Onset    Goiter Mother     Breast cancer Mother     Mental illness Mother     Hypertension Mother     Thyroid disease Mother     Anxiety disorder Mother     Polycythemia Father     Hearing loss Father         wears hearing aids    Colon cancer Maternal Grandmother     Dementia Maternal Grandmother     Hypertension Maternal Grandmother     Diabetes Maternal Grandfather     Hypertension Maternal Grandfather     Colon cancer Maternal Grandfather     Uterine cancer Paternal Grandmother     Hypertension Paternal Grandmother     Cancer Paternal Grandmother         uterine/ovarian cancer    Diabetes Paternal Grandfather     Dementia Paternal Grandfather     Glaucoma Paternal Grandfather     Hyperthyroidism Maternal Aunt     Alcohol abuse Neg Hx     Substance Abuse Neg Hx     Anesthesia problems Neg Hx      Current Outpatient Medications on File Prior to Visit   Medication Sig Dispense Refill    acetaminophen (TYLENOL) 500 mg tablet Take 2 tablets (1,000 mg total) by mouth every 8 (eight) hours 60 tablet 0    ALPRAZolam (XANAX) 0.25 mg tablet Take 1 tablet (0.25 mg total) by mouth 4 (four) times a day as needed for anxiety 30 tablet 0    ascorbic acid (VITAMIN C) 500 MG tablet Take 1 tablet (500 mg total) by mouth 2 (two) times a day 60 tablet 1    Cholecalciferol (VITAMIN D3) 1,000 units tablet Take 1 tablet (1,000 Units total) by mouth daily 60 tablet 1    eletriptan (RELPAX) 40 MG tablet Take 1 tablet (40 mg total) by mouth once as needed for migraine for up to 1 dose may repeat in 2 hours if necessary 12 tablet 3    folic acid (FOLVITE) 1 mg tablet Take 1 tablet (1 mg total) by mouth daily 30 tablet 1    levonorgestrel (MIRENA) 20 MCG/24HR IUD 1 each by Intrauterine route once      Multiple Vitamins-Minerals (multivitamin with minerals) tablet Take 1 tablet by mouth daily 30 tablet 1    ondansetron (ZOFRAN-ODT) 4 mg disintegrating tablet Take 1  tablet (4 mg total) by mouth every 6 (six) hours as needed for nausea or vomiting 20 tablet 0    Semaglutide-Weight Management (WEGOVY) 1.7 MG/0.75ML Inject 0.75 mL (1.7 mg total) under the skin once a week (Patient taking differently: Inject 1.7 mg under the skin once a week Tuesdays) 3 mL 0    venlafaxine (EFFEXOR-XR) 150 mg 24 hr capsule Take 1 capsule (150 mg total) by mouth daily with breakfast 90 capsule 1    ascorbic acid (VITAMIN C) 500 MG tablet Take 1 tablet (500 mg total) by mouth 2 (two) times a day 60 tablet 1    aspirin (ECOTRIN LOW STRENGTH) 81 mg EC tablet Take 1 tablet (81 mg total) by mouth 2 (two) times a day (Patient not taking: Reported on 5/31/2024) 60 tablet 0    cholecalciferol (VITAMIN D3) 1,000 units tablet Take 2 tablets (2,000 Units total) by mouth daily 60 tablet 1    diclofenac (VOLTAREN) 75 mg EC tablet Take 1 tablet (75 mg total) by mouth 2 (two) times a day 60 tablet 0    diclofenac (VOLTAREN) 75 mg EC tablet Take 1 tablet (75 mg total) by mouth 2 (two) times a day (Patient not taking: Reported on 9/19/2024) 60 tablet 1    folic acid (FOLVITE) 1 mg tablet Take 1 tablet (1 mg total) by mouth daily 30 tablet 1    mometasone (ELOCON) 0.1 % cream Apply topically daily (Patient not taking: Reported on 9/19/2024) 45 g 3    Multiple Vitamins-Minerals (multivitamin with minerals) tablet Take 1 tablet by mouth daily 30 tablet 1    NON FORMULARY in the morning Stool softner daily (Patient not taking: Reported on 9/19/2024)      ondansetron (ZOFRAN) 4 mg tablet Take 1 tablet (4 mg total) by mouth every 8 (eight) hours as needed for nausea or vomiting 20 tablet 0    senna-docusate sodium (SENOKOT S) 8.6-50 mg per tablet Take 1 tablet by mouth daily (Patient not taking: Reported on 5/31/2024) 30 tablet 0    [DISCONTINUED] hydrocortisone (ANUSOL-HC) 2.5 % rectal cream Apply topically 2 (two) times a day For 3 days after having a Sitz bath 28 g 1     No current facility-administered medications on  file prior to visit.     Allergies   Allergen Reactions    Bactrim [Sulfamethoxazole-Trimethoprim] Anaphylaxis and Edema     [facility] - [id number]: painful to swallow      Current Outpatient Medications on File Prior to Visit   Medication Sig Dispense Refill    acetaminophen (TYLENOL) 500 mg tablet Take 2 tablets (1,000 mg total) by mouth every 8 (eight) hours 60 tablet 0    ALPRAZolam (XANAX) 0.25 mg tablet Take 1 tablet (0.25 mg total) by mouth 4 (four) times a day as needed for anxiety 30 tablet 0    ascorbic acid (VITAMIN C) 500 MG tablet Take 1 tablet (500 mg total) by mouth 2 (two) times a day 60 tablet 1    Cholecalciferol (VITAMIN D3) 1,000 units tablet Take 1 tablet (1,000 Units total) by mouth daily 60 tablet 1    eletriptan (RELPAX) 40 MG tablet Take 1 tablet (40 mg total) by mouth once as needed for migraine for up to 1 dose may repeat in 2 hours if necessary 12 tablet 3    folic acid (FOLVITE) 1 mg tablet Take 1 tablet (1 mg total) by mouth daily 30 tablet 1    levonorgestrel (MIRENA) 20 MCG/24HR IUD 1 each by Intrauterine route once      Multiple Vitamins-Minerals (multivitamin with minerals) tablet Take 1 tablet by mouth daily 30 tablet 1    ondansetron (ZOFRAN-ODT) 4 mg disintegrating tablet Take 1 tablet (4 mg total) by mouth every 6 (six) hours as needed for nausea or vomiting 20 tablet 0    Semaglutide-Weight Management (WEGOVY) 1.7 MG/0.75ML Inject 0.75 mL (1.7 mg total) under the skin once a week (Patient taking differently: Inject 1.7 mg under the skin once a week Tuesdays) 3 mL 0    venlafaxine (EFFEXOR-XR) 150 mg 24 hr capsule Take 1 capsule (150 mg total) by mouth daily with breakfast 90 capsule 1    ascorbic acid (VITAMIN C) 500 MG tablet Take 1 tablet (500 mg total) by mouth 2 (two) times a day 60 tablet 1    aspirin (ECOTRIN LOW STRENGTH) 81 mg EC tablet Take 1 tablet (81 mg total) by mouth 2 (two) times a day (Patient not taking: Reported on 5/31/2024) 60 tablet 0    cholecalciferol  (VITAMIN D3) 1,000 units tablet Take 2 tablets (2,000 Units total) by mouth daily 60 tablet 1    diclofenac (VOLTAREN) 75 mg EC tablet Take 1 tablet (75 mg total) by mouth 2 (two) times a day 60 tablet 0    diclofenac (VOLTAREN) 75 mg EC tablet Take 1 tablet (75 mg total) by mouth 2 (two) times a day (Patient not taking: Reported on 9/19/2024) 60 tablet 1    folic acid (FOLVITE) 1 mg tablet Take 1 tablet (1 mg total) by mouth daily 30 tablet 1    mometasone (ELOCON) 0.1 % cream Apply topically daily (Patient not taking: Reported on 9/19/2024) 45 g 3    Multiple Vitamins-Minerals (multivitamin with minerals) tablet Take 1 tablet by mouth daily 30 tablet 1    NON FORMULARY in the morning Stool softner daily (Patient not taking: Reported on 9/19/2024)      ondansetron (ZOFRAN) 4 mg tablet Take 1 tablet (4 mg total) by mouth every 8 (eight) hours as needed for nausea or vomiting 20 tablet 0    senna-docusate sodium (SENOKOT S) 8.6-50 mg per tablet Take 1 tablet by mouth daily (Patient not taking: Reported on 5/31/2024) 30 tablet 0    [DISCONTINUED] hydrocortisone (ANUSOL-HC) 2.5 % rectal cream Apply topically 2 (two) times a day For 3 days after having a Sitz bath 28 g 1     No current facility-administered medications on file prior to visit.      Social History     Tobacco Use    Smoking status: Never    Smokeless tobacco: Never    Tobacco comments:     Never a smoker or use of any tobacco products per pt    Vaping Use    Vaping status: Never Used   Substance and Sexual Activity    Alcohol use: Yes     Alcohol/week: 3.0 standard drinks of alcohol     Types: 3 Standard drinks or equivalent per week     Comment: Socially-- avg use couple times a month, last drink (3/6/24)    Drug use: No     Comment: Denies any drug use per pt    Sexual activity: Yes     Partners: Male     Birth control/protection: I.U.D.     Comment: Denies any chest pain or shortness of breath with activity         Objective     /84   Pulse 97    Temp 98.9 °F (37.2 °C)   Wt 98.2 kg (216 lb 6.4 oz)   SpO2 98%   BMI 38.33 kg/m²     Physical Exam  Vitals reviewed.   HENT:      Right Ear: Tympanic membrane and ear canal normal.      Left Ear: Tympanic membrane and ear canal normal.      Nose: Congestion present.      Mouth/Throat:      Pharynx: Posterior oropharyngeal erythema present.   Eyes:      Extraocular Movements: Extraocular movements intact.   Cardiovascular:      Rate and Rhythm: Normal rate.      Pulses: Normal pulses.   Pulmonary:      Effort: Pulmonary effort is normal.      Breath sounds: Normal breath sounds. No wheezing.   Abdominal:      Palpations: Abdomen is soft.      Tenderness: There is no abdominal tenderness.   Neurological:      Mental Status: She is alert.   Psychiatric:         Mood and Affect: Mood normal.       Administrative Statements   I have spent a total time of 20 minutes in caring for this patient on the day of the visit/encounter including Risks and benefits of tx options, Instructions for management, Risk factor reductions, Impressions, and Reviewing / ordering tests, medicine, procedures  .

## 2024-09-24 ENCOUNTER — CONSULT (OUTPATIENT)
Dept: FAMILY MEDICINE CLINIC | Facility: CLINIC | Age: 39
End: 2024-09-24
Payer: COMMERCIAL

## 2024-09-24 VITALS
HEIGHT: 63 IN | TEMPERATURE: 98 F | WEIGHT: 214 LBS | HEART RATE: 81 BPM | RESPIRATION RATE: 16 BRPM | OXYGEN SATURATION: 98 % | DIASTOLIC BLOOD PRESSURE: 82 MMHG | BODY MASS INDEX: 37.92 KG/M2 | SYSTOLIC BLOOD PRESSURE: 144 MMHG

## 2024-09-24 DIAGNOSIS — M17.11 PRIMARY OSTEOARTHRITIS OF RIGHT KNEE: ICD-10-CM

## 2024-09-24 DIAGNOSIS — M17.12 PRIMARY OSTEOARTHRITIS OF LEFT KNEE: ICD-10-CM

## 2024-09-24 DIAGNOSIS — F41.8 DEPRESSION WITH ANXIETY: ICD-10-CM

## 2024-09-24 DIAGNOSIS — Z01.818 PRE-OP EXAMINATION: Primary | ICD-10-CM

## 2024-09-24 DIAGNOSIS — G43.009 MIGRAINE WITHOUT AURA AND WITHOUT STATUS MIGRAINOSUS, NOT INTRACTABLE: ICD-10-CM

## 2024-09-24 DIAGNOSIS — E78.2 MIXED HYPERLIPIDEMIA: ICD-10-CM

## 2024-09-24 PROBLEM — E66.01 MORBID OBESITY WITH BMI OF 40.0-44.9, ADULT (HCC): Status: RESOLVED | Noted: 2020-01-15 | Resolved: 2024-09-24

## 2024-09-24 PROCEDURE — 99214 OFFICE O/P EST MOD 30 MIN: CPT | Performed by: PHYSICIAN ASSISTANT

## 2024-09-24 RX ORDER — ONDANSETRON 4 MG/1
4 TABLET, ORALLY DISINTEGRATING ORAL EVERY 6 HOURS PRN
Start: 2024-09-24

## 2024-09-24 RX ORDER — MULTIVIT-MIN/IRON FUM/FOLIC AC 7.5 MG-4
1 TABLET ORAL DAILY
Start: 2024-09-24

## 2024-09-24 RX ORDER — AMOXICILLIN 250 MG
1 CAPSULE ORAL DAILY
Start: 2024-09-24

## 2024-09-24 RX ORDER — DICLOFENAC SODIUM 75 MG/1
75 TABLET, DELAYED RELEASE ORAL 2 TIMES DAILY
Start: 2024-09-24

## 2024-09-24 NOTE — H&P (VIEW-ONLY)
FAMILY [facility] PRE-OPERATIVE EVALUATION  [facility]YSICIAN [facility] - [facility]    NAME: Kaitlynn Madera  AGE: 39 y.o. SEX: female  : 1985     DATE: 2024    [facility] Pre-Operative Evaluation      Chief Complaint: Pre-operative Evaluation     Surgery: ARTHROPLASTY KNEE UNICOMPARTMENTAL vs TKA- SAME DAY, left knee  Anticipated Date of Surgery: 10/17/2024  Referring Provider: Dr Gray    History of Present Illness:     Kaitlynn Madera is a 39 y.o. female who presents to the office today for a preoperative consultation at the request of surgeon, Dr Gray, who plans on performing ARTHROPLASTY KNEE UNICOMPARTMENTAL vs TKA- SAME DAY, left knee on 10/17/2024. Planned anesthesia is  choice . Patient has a bleeding risk of: no recent abnormal bleeding. Patient does not have objections to receiving blood products if needed. Current anti-platelet/anti-coagulation medications that the patient is prescribed includes:  none .      Assessment of Chronic Conditions:   Migraine - well controlled on effexor, relpax  Depression/anxiety - on effexor  Obesity - on wegovy     Assessment of Cardiac Risk:  Denies unstable or severe angina or MI in the last 6 weeks or history of stent placement in the last year   Denies decompensated heart failure (e.g. New onset heart failure, NYHA functional class IV heart failure, or worsening existing heart failure)  Denies significant arrhythmias such as high grade AV block, symptomatic ventricular arrhythmia, newly recognized ventricular tachycardia, supraventricular tachycardia with resting heart rate >100, or symptomatic bradycardia  Denies severe heart valve disease including aortic stenosis or symptomatic mitral stenosis     Exercise Capacity:  Able to walk 4 blocks without symptoms?: Yes  Able to walk 2 flights without symptoms?: Yes    Prior Anesthesia Reactions: Yes, nausea     Personal history of venous thromboembolic disease?  No    History of steroid use for >2 weeks within last year? No         Review of Systems:     Review of Systems   Constitutional: Negative.    HENT: Negative.     Eyes: Negative.    Respiratory: Negative.     Cardiovascular: Negative.    Gastrointestinal: Negative.    Endocrine: Negative.    Genitourinary: Negative.    Musculoskeletal: Negative.         Left knee pain   Skin: Negative.    Allergic/Immunologic: Negative.    Neurological: Negative.    Hematological: Negative.    Psychiatric/Behavioral: Negative.         Current Problem List:     Patient Active Problem List   Diagnosis    Migraines    Motion sickness    Depression with anxiety    Morbid obesity with BMI of 40.0-44.9, adult (HCC)    Radiculitis of right cervical region    IUD (intrauterine device) in place    Mixed hyperlipidemia    Primary osteoarthritis of right knee       Allergies:     Allergies   Allergen Reactions    Bactrim [Sulfamethoxazole-Trimethoprim] Anaphylaxis and Edema     Annotation - [id number]: painful to swallow       Current Medications:       Current Outpatient Medications:     acetaminophen (TYLENOL) 500 mg tablet, Take 2 tablets (1,000 mg total) by mouth every 8 (eight) hours, Disp: 60 tablet, Rfl: 0    ALPRAZolam (XANAX) 0.25 mg tablet, Take 1 tablet (0.25 mg total) by mouth 4 (four) times a day as needed for anxiety, Disp: 30 tablet, Rfl: 0    ascorbic acid (VITAMIN C) 500 MG tablet, Take 1 tablet (500 mg total) by mouth 2 (two) times a day, Disp: 60 tablet, Rfl: 1    Cholecalciferol (VITAMIN D3) 1,000 units tablet, Take 1 tablet (1,000 Units total) by mouth daily, Disp: 60 tablet, Rfl: 1    eletriptan (RELPAX) 40 MG tablet, Take 1 tablet (40 mg total) by mouth once as needed for migraine for up to 1 dose may repeat in 2 hours if necessary, Disp: 12 tablet, Rfl: 3    folic acid (FOLVITE) 1 mg tablet, Take 1 tablet (1 mg total) by mouth daily, Disp: 30 tablet, Rfl: 1    levonorgestrel (MIRENA) 20 MCG/24HR IUD, 1 each by  Intrauterine route once, Disp: , Rfl:     Multiple Vitamins-Minerals (multivitamin with minerals) tablet, Take 1 tablet by mouth daily, Disp: 30 tablet, Rfl: 1    ondansetron (ZOFRAN) 4 mg tablet, Take 1 tablet (4 mg total) by mouth every 8 (eight) hours as needed for nausea or vomiting, Disp: 20 tablet, Rfl: 0    Semaglutide-Weight Management (WEGOVY) 1.7 MG/0.75ML, Inject 0.75 mL (1.7 mg total) under the skin once a week (Patient taking differently: Inject 1.7 mg under the skin once a week Tuesdays), Disp: 3 mL, Rfl: 0    venlafaxine (EFFEXOR-XR) 150 mg 24 hr capsule, Take 1 capsule (150 mg total) by mouth daily with breakfast, Disp: 90 capsule, Rfl: 1    ascorbic acid (VITAMIN C) 500 MG tablet, Take 1 tablet (500 mg total) by mouth 2 (two) times a day (Patient not taking: Reported on 9/24/2024), Disp: 60 tablet, Rfl: 1    aspirin (ECOTRIN LOW STRENGTH) 81 mg EC tablet, Take 1 tablet (81 mg total) by mouth 2 (two) times a day (Patient not taking: Reported on 5/31/2024), Disp: 60 tablet, Rfl: 0    cholecalciferol (VITAMIN D3) 1,000 units tablet, Take 2 tablets (2,000 Units total) by mouth daily (Patient not taking: Reported on 9/24/2024), Disp: 60 tablet, Rfl: 1    diclofenac (VOLTAREN) 75 mg EC tablet, Take 1 tablet (75 mg total) by mouth 2 (two) times a day (Patient not taking: Reported on 9/24/2024), Disp: 60 tablet, Rfl: 0    diclofenac (VOLTAREN) 75 mg EC tablet, Take 1 tablet (75 mg total) by mouth 2 (two) times a day (Patient not taking: Reported on 9/19/2024), Disp: 60 tablet, Rfl: 1    folic acid (FOLVITE) 1 mg tablet, Take 1 tablet (1 mg total) by mouth daily (Patient not taking: Reported on 9/24/2024), Disp: 30 tablet, Rfl: 1    mometasone (ELOCON) 0.1 % cream, Apply topically daily (Patient not taking: Reported on 9/19/2024), Disp: 45 g, Rfl: 3    Multiple Vitamins-Minerals (multivitamin with minerals) tablet, Take 1 tablet by mouth daily (Patient not taking: Reported on 9/24/2024), Disp: 30 tablet,  Rfl: 1    NON FORMULARY, in the morning Stool softner daily (Patient not taking: Reported on 9/19/2024), Disp: , Rfl:     ondansetron (ZOFRAN-ODT) 4 mg disintegrating tablet, Take 1 tablet (4 mg total) by mouth every 6 (six) hours as needed for nausea or vomiting (Patient not taking: Reported on 9/24/2024), Disp: 20 tablet, Rfl: 0    senna-docusate sodium (SENOKOT S) 8.6-50 mg per tablet, Take 1 tablet by mouth daily (Patient not taking: Reported on 5/31/2024), Disp: 30 tablet, Rfl: 0    Past Medical History:       Past Medical History:   Diagnosis Date    Anemia     hx of anemia - per pt was told with trying to donate blood    Anesthesia     2/12/24 per pt - reports with epidural use - pt experienced big BP drop    Anxiety     Blood type, Rh negative     Resolved 2/18/2016     Kidney stone     Knee pain, right     Migraine     Obesity     PONV (postoperative nausea and vomiting)         Past Surgical History:   Procedure Laterality Date    EAR SURGERY      Last impression 8/5/2015     MYRINGOTOMY      GA ARTHRP KNEE CONDYLE&PLATEAU MEDIAL/LAT CMPRT Right 3/7/2024    Procedure: ARTHROPLASTY KNEE UNICOMPARTMENTAL;  Surgeon: León Gray DO;  Location: [facility];  Service: Orthopedics    TONSILLECTOMY      WISDOM TOOTH EXTRACTION          Family History   Problem Relation Age of Onset    Goiter Mother     Breast cancer Mother     Mental illness Mother     Hypertension Mother     Thyroid disease Mother     Anxiety disorder Mother     Polycythemia Father     Hearing loss Father         wears hearing aids    Colon cancer Maternal Grandmother     Dementia Maternal Grandmother     Hypertension Maternal Grandmother     Diabetes Maternal Grandfather     Hypertension Maternal Grandfather     Colon cancer Maternal Grandfather     Uterine cancer Paternal Grandmother     Hypertension Paternal Grandmother     Cancer Paternal Grandmother         uterine/ovarian cancer    Diabetes Paternal Grandfather     Dementia  "Paternal Grandfather     Glaucoma Paternal Grandfather     Hyperthyroidism Maternal Aunt     Alcohol abuse Neg Hx     Substance Abuse Neg Hx     Anesthesia problems Neg Hx         Social History     Socioeconomic History    Marital status: /Civil Union     Spouse name: Not on file    Number of children: Not on file    Years of education: Not on file    Highest education level: Not on file   Occupational History    Not on file   Tobacco Use    Smoking status: Never    Smokeless tobacco: Never    Tobacco comments:     Never a smoker or use of any tobacco products per pt    Vaping Use    Vaping status: Never Used   Substance and Sexual Activity    Alcohol use: Yes     Alcohol/week: 3.0 standard drinks of alcohol     Types: 3 Standard drinks or equivalent per week     Comment: Socially-- avg use couple times a month, last drink (3/6/24)    Drug use: No     Comment: Denies any drug use per pt    Sexual activity: Yes     Partners: Male     Birth control/protection: I.U.D.     Comment: Denies any chest pain or shortness of breath with activity   Other Topics Concern    Not on file   Social History Narrative    Alcohol use - as per [employer]    Always uses seat belt    Daily caffeine consumption, 1 serving a day - as per [employer]     Has smoke detectors        Occasional caffeine consumption - as per [employer]      Social Determinants of Health     Financial Resource Strain: Not on file   Food Insecurity: Not on file   Transportation Needs: Not on file   Physical Activity: Not on file   Stress: Not on file   Social Connections: Not on file   Intimate Partner Violence: Not on file   Housing Stability: Not on file        Physical Exam:     /82   Pulse 81   Temp 98 °F (36.7 °C) (Temporal)   Resp 16   Ht 5' 3\" (1.6 m)   Wt 97.1 kg (214 lb)   SpO2 98%   BMI 37.91 kg/m²     Physical Exam  Constitutional:       Appearance: Normal appearance. She is well-developed and normal weight.   HENT:      Head: " Normocephalic and atraumatic.      Right Ear: Tympanic membrane, ear canal and external ear normal.      Left Ear: Tympanic membrane, ear canal and external ear normal.      Nose: Nose normal.      Mouth/Throat:      Mouth: Mucous membranes are moist.      Pharynx: Oropharynx is clear.   Eyes:      Extraocular Movements: Extraocular movements intact.      Conjunctiva/sclera: Conjunctivae normal.      Pupils: Pupils are equal, round, and reactive to light.   Neck:      Thyroid: No thyromegaly.   Cardiovascular:      Rate and Rhythm: Normal rate and regular rhythm.      Pulses: Normal pulses.      Heart sounds: Normal heart sounds. No murmur heard.  Pulmonary:      Effort: Pulmonary effort is normal. No respiratory distress.      Breath sounds: Normal breath sounds. No wheezing or rales.   Abdominal:      General: Abdomen is flat. Bowel sounds are normal. There is no distension.      Palpations: Abdomen is soft. There is no mass.      Tenderness: There is no abdominal tenderness.   Musculoskeletal:         General: Normal range of motion.      Cervical back: Normal range of motion and neck supple. No rigidity or tenderness.   Lymphadenopathy:      Cervical: No cervical adenopathy.   Skin:     General: Skin is warm and dry.   Neurological:      General: No focal deficit present.      Mental Status: She is alert and oriented to person, place, and time.      Cranial Nerves: No cranial nerve deficit.      Deep Tendon Reflexes: Reflexes are normal and symmetric.   Psychiatric:         Mood and Affect: Mood normal.         Behavior: Behavior normal.         Thought Content: Thought content normal.         Judgment: Judgment normal.        Data:     Pre-operative work-up    Laboratory Results: I have personally reviewed the pertinent laboratory results/reports      EKG: Reviewed radiology reports from this admission including: EKG.    Chest x-ray: No pertinent imaging studies reviewed.      Previous cardiopulmonary studies  within the past year:  Echocardiogram: none  Cardiac Catheterization: none  Stress Test: none  Pulmonary Function Testing: none      Assessment & Recommendations:       Pre-Op Evaluation Assessment  39 y.o. female with planned surgery: ARTHROPLASTY KNEE UNICOMPARTMENTAL vs TKA- SAME DAY  .    Known risk factors for perioperative complications: None.        Current medications which may produce withdrawal symptoms if withheld perioperatively: none.    Pre-Op Evaluation Plan  1. Further preoperative workup as follows:   - None; no further preoperative work-up is required    2. Medication Management/Recommendations:   - None, continue medication regimen including morning of surgery, with sip of water. Will hold Wegovy per surgical coordinator    3. Prophylaxis for cardiac events with perioperative beta-blockers: not indicated.    4. Patient requires further consultation with: None    Clearance  Patient is CLEARED for surgery without any additional cardiac testing.     Shannon Murrieta PA-C  [facility]  [address] [facility] [place] [address] [address]  Phone#  [phone]  Fax#  [phone]

## 2024-09-24 NOTE — PROGRESS NOTES
FAMILY [facility] PRE-OPERATIVE EVALUATION  [facility] PHYSICIAN [facility] - [facility] PRACT[facility]    NAME: Kaitlynn Madera  AGE: 39 y.o. SEX: female  : 1985     DATE: 2024    [facility] Pre-Operative Evaluation      Chief Complaint: Pre-operative Evaluation     Surgery: ARTHROPLASTY KNEE UNICOMPARTMENTAL vs TKA- SAME DAY, left knee  Anticipated Date of Surgery: 10/17/2024  Referring Provider: Dr Gray    History of Present Illness:     Kaitlynn Madera is a 39 y.o. female who presents to the office today for a preoperative consultation at the request of surgeon, Dr Gray, who plans on performing ARTHROPLASTY KNEE UNICOMPARTMENTAL vs TKA- SAME DAY, left knee on 10/17/2024. Planned anesthesia is  choice . Patient has a bleeding risk of: no recent abnormal bleeding. Patient does not have objections to receiving blood products if needed. Current anti-platelet/anti-coagulation medications that the patient is prescribed includes:  none .      Assessment of Chronic Conditions:   Migraine - well controlled on effexor, relpax  Depression/anxiety - on effexor  Obesity - on wegovy     Assessment of Cardiac Risk:  Denies unstable or severe angina or MI in the last 6 weeks or history of stent placement in the last year   Denies decompensated heart failure (e.g. New onset heart failure, NYHA functional class IV heart failure, or worsening existing heart failure)  Denies significant arrhythmias such as high grade AV block, symptomatic ventricular arrhythmia, newly recognized ventricular tachycardia, supraventricular tachycardia with resting heart rate >100, or symptomatic bradycardia  Denies severe heart valve disease including aortic stenosis or symptomatic mitral stenosis     Exercise Capacity:  Able to walk 4 blocks without symptoms?: Yes  Able to walk 2 flights without symptoms?: Yes    Prior Anesthesia Reactions: Yes, nausea     Personal history of venous thromboembolic disease?  No    History of steroid use for >2 weeks within last year? No         Review of Systems:     Review of Systems   Constitutional: Negative.    HENT: Negative.     Eyes: Negative.    Respiratory: Negative.     Cardiovascular: Negative.    Gastrointestinal: Negative.    Endocrine: Negative.    Genitourinary: Negative.    Musculoskeletal: Negative.         Left knee pain   Skin: Negative.    Allergic/Immunologic: Negative.    Neurological: Negative.    Hematological: Negative.    Psychiatric/Behavioral: Negative.         Current Problem List:     Patient Active Problem List   Diagnosis    Migraines    Motion sickness    Depression with anxiety    Morbid obesity with BMI of 40.0-44.9, adult (HCC)    Radiculitis of right cervical region    IUD (intrauterine device) in place    Mixed hyperlipidemia    Primary osteoarthritis of right knee       Allergies:     Allergies   Allergen Reactions    Bactrim [Sulfamethoxazole-Trimethoprim] Anaphylaxis and Edema     Annotation - [id number]: painful to swallow       Current Medications:       Current Outpatient Medications:     acetaminophen (TYLENOL) 500 mg tablet, Take 2 tablets (1,000 mg total) by mouth every 8 (eight) hours, Disp: 60 tablet, Rfl: 0    ALPRAZolam (XANAX) 0.25 mg tablet, Take 1 tablet (0.25 mg total) by mouth 4 (four) times a day as needed for anxiety, Disp: 30 tablet, Rfl: 0    ascorbic acid (VITAMIN C) 500 MG tablet, Take 1 tablet (500 mg total) by mouth 2 (two) times a day, Disp: 60 tablet, Rfl: 1    Cholecalciferol (VITAMIN D3) 1,000 units tablet, Take 1 tablet (1,000 Units total) by mouth daily, Disp: 60 tablet, Rfl: 1    eletriptan (RELPAX) 40 MG tablet, Take 1 tablet (40 mg total) by mouth once as needed for migraine for up to 1 dose may repeat in 2 hours if necessary, Disp: 12 tablet, Rfl: 3    folic acid (FOLVITE) 1 mg tablet, Take 1 tablet (1 mg total) by mouth daily, Disp: 30 tablet, Rfl: 1    levonorgestrel (MIRENA) 20 MCG/24HR IUD, 1 each by  Intrauterine route once, Disp: , Rfl:     Multiple Vitamins-Minerals (multivitamin with minerals) tablet, Take 1 tablet by mouth daily, Disp: 30 tablet, Rfl: 1    ondansetron (ZOFRAN) 4 mg tablet, Take 1 tablet (4 mg total) by mouth every 8 (eight) hours as needed for nausea or vomiting, Disp: 20 tablet, Rfl: 0    Semaglutide-Weight Management (WEGOVY) 1.7 MG/0.75ML, Inject 0.75 mL (1.7 mg total) under the skin once a week (Patient taking differently: Inject 1.7 mg under the skin once a week Tuesdays), Disp: 3 mL, Rfl: 0    venlafaxine (EFFEXOR-XR) 150 mg 24 hr capsule, Take 1 capsule (150 mg total) by mouth daily with breakfast, Disp: 90 capsule, Rfl: 1    ascorbic acid (VITAMIN C) 500 MG tablet, Take 1 tablet (500 mg total) by mouth 2 (two) times a day (Patient not taking: Reported on 9/24/2024), Disp: 60 tablet, Rfl: 1    aspirin (ECOTRIN LOW STRENGTH) 81 mg EC tablet, Take 1 tablet (81 mg total) by mouth 2 (two) times a day (Patient not taking: Reported on 5/31/2024), Disp: 60 tablet, Rfl: 0    cholecalciferol (VITAMIN D3) 1,000 units tablet, Take 2 tablets (2,000 Units total) by mouth daily (Patient not taking: Reported on 9/24/2024), Disp: 60 tablet, Rfl: 1    diclofenac (VOLTAREN) 75 mg EC tablet, Take 1 tablet (75 mg total) by mouth 2 (two) times a day (Patient not taking: Reported on 9/24/2024), Disp: 60 tablet, Rfl: 0    diclofenac (VOLTAREN) 75 mg EC tablet, Take 1 tablet (75 mg total) by mouth 2 (two) times a day (Patient not taking: Reported on 9/19/2024), Disp: 60 tablet, Rfl: 1    folic acid (FOLVITE) 1 mg tablet, Take 1 tablet (1 mg total) by mouth daily (Patient not taking: Reported on 9/24/2024), Disp: 30 tablet, Rfl: 1    mometasone (ELOCON) 0.1 % cream, Apply topically daily (Patient not taking: Reported on 9/19/2024), Disp: 45 g, Rfl: 3    Multiple Vitamins-Minerals (multivitamin with minerals) tablet, Take 1 tablet by mouth daily (Patient not taking: Reported on 9/24/2024), Disp: 30 tablet,  Rfl: 1    NON FORMULARY, in the morning Stool softner daily (Patient not taking: Reported on 9/19/2024), Disp: , Rfl:     ondansetron (ZOFRAN-ODT) 4 mg disintegrating tablet, Take 1 tablet (4 mg total) by mouth every 6 (six) hours as needed for nausea or vomiting (Patient not taking: Reported on 9/24/2024), Disp: 20 tablet, Rfl: 0    senna-docusate sodium (SENOKOT S) 8.6-50 mg per tablet, Take 1 tablet by mouth daily (Patient not taking: Reported on 5/31/2024), Disp: 30 tablet, Rfl: 0    Past Medical History:       Past Medical History:   Diagnosis Date    Anemia     hx of anemia - per pt was told with trying to donate blood    Anesthesia     2/12/24 per pt - reports with epidural use - pt experienced big BP drop    Anxiety     Blood type, Rh negative     Resolved 2/18/2016     Kidney stone     Knee pain, right     Migraine     Obesity     PONV (postoperative nausea and vomiting)         Past Surgical History:   Procedure Laterality Date    EAR SURGERY      Last impression 8/5/2015     MYRINGOTOMY      WY ARTHRP KNEE CONDYLE&PLATEAU MEDIAL/LAT CMPRT Right 3/7/2024    Procedure: ARTHROPLASTY KNEE UNICOMPARTMENTAL;  Surgeon: León Gray DO;  Location: [facility];  Service: Orthopedics    TONSILLECTOMY      WISDOM TOOTH EXTRACTION          Family History   Problem Relation Age of Onset    Goiter Mother     Breast cancer Mother     Mental illness Mother     Hypertension Mother     Thyroid disease Mother     Anxiety disorder Mother     Polycythemia Father     Hearing loss Father         wears hearing aids    Colon cancer Maternal Grandmother     Dementia Maternal Grandmother     Hypertension Maternal Grandmother     Diabetes Maternal Grandfather     Hypertension Maternal Grandfather     Colon cancer Maternal Grandfather     Uterine cancer Paternal Grandmother     Hypertension Paternal Grandmother     Cancer Paternal Grandmother         uterine/ovarian cancer    Diabetes Paternal Grandfather     Dementia  "Paternal Grandfather     Glaucoma Paternal Grandfather     Hyperthyroidism Maternal Aunt     Alcohol abuse Neg Hx     Substance Abuse Neg Hx     Anesthesia problems Neg Hx         Social History     Socioeconomic History    Marital status: /Civil Union     Spouse name: Not on file    Number of children: Not on file    Years of education: Not on file    Highest education level: Not on file   Occupational History    Not on file   Tobacco Use    Smoking status: Never    Smokeless tobacco: Never    Tobacco comments:     Never a smoker or use of any tobacco products per pt    Vaping Use    Vaping status: Never Used   Substance and Sexual Activity    Alcohol use: Yes     Alcohol/week: 3.0 standard drinks of alcohol     Types: 3 Standard drinks or equivalent per week     Comment: Socially-- avg use couple times a month, last drink (3/6/24)    Drug use: No     Comment: Denies any drug use per pt    Sexual activity: Yes     Partners: Male     Birth control/protection: I.U.D.     Comment: Denies any chest pain or shortness of breath with activity   Other Topics Concern    Not on file   Social History Narrative    Alcohol use - as per [employer]    Always uses seat belt    Daily caffeine consumption, 1 serving a day - as per [employer]     Has smoke detectors        Occasional caffeine consumption - as per [employer]      Social Determinants of Health     Financial Resource Strain: Not on file   Food Insecurity: Not on file   Transportation Needs: Not on file   Physical Activity: Not on file   Stress: Not on file   Social Connections: Not on file   Intimate Partner Violence: Not on file   Housing Stability: Not on file        Physical Exam:     /82   Pulse 81   Temp 98 °F (36.7 °C) (Temporal)   Resp 16   Ht 5' 3\" (1.6 m)   Wt 97.1 kg (214 lb)   SpO2 98%   BMI 37.91 kg/m²     Physical Exam  Constitutional:       Appearance: Normal appearance. She is well-developed and normal weight.   HENT:      Head: " Normocephalic and atraumatic.      Right Ear: Tympanic membrane, ear canal and external ear normal.      Left Ear: Tympanic membrane, ear canal and external ear normal.      Nose: Nose normal.      Mouth/Throat:      Mouth: Mucous membranes are moist.      Pharynx: Oropharynx is clear.   Eyes:      Extraocular Movements: Extraocular movements intact.      Conjunctiva/sclera: Conjunctivae normal.      Pupils: Pupils are equal, round, and reactive to light.   Neck:      Thyroid: No thyromegaly.   Cardiovascular:      Rate and Rhythm: Normal rate and regular rhythm.      Pulses: Normal pulses.      Heart sounds: Normal heart sounds. No murmur heard.  Pulmonary:      Effort: Pulmonary effort is normal. No respiratory distress.      Breath sounds: Normal breath sounds. No wheezing or rales.   Abdominal:      General: Abdomen is flat. Bowel sounds are normal. There is no distension.      Palpations: Abdomen is soft. There is no mass.      Tenderness: There is no abdominal tenderness.   Musculoskeletal:         General: Normal range of motion.      Cervical back: Normal range of motion and neck supple. No rigidity or tenderness.   Lymphadenopathy:      Cervical: No cervical adenopathy.   Skin:     General: Skin is warm and dry.   Neurological:      General: No focal deficit present.      Mental Status: She is alert and oriented to person, place, and time.      Cranial Nerves: No cranial nerve deficit.      Deep Tendon Reflexes: Reflexes are normal and symmetric.   Psychiatric:         Mood and Affect: Mood normal.         Behavior: Behavior normal.         Thought Content: Thought content normal.         Judgment: Judgment normal.        Data:     Pre-operative work-up    Laboratory Results: I have personally reviewed the pertinent laboratory results/reports      EKG: Reviewed radiology reports from this admission including: EKG.    Chest x-ray: No pertinent imaging studies reviewed.      Previous cardiopulmonary studies  within the past year:  Echocardiogram: none  Cardiac Catheterization: none  Stress Test: none  Pulmonary Function Testing: none      Assessment & Recommendations:       Pre-Op Evaluation Assessment  39 y.o. female with planned surgery: ARTHROPLASTY KNEE UNICOMPARTMENTAL vs TKA- SAME DAY  .    Known risk factors for perioperative complications: None.        Current medications which may produce withdrawal symptoms if withheld perioperatively: none.    Pre-Op Evaluation Plan  1. Further preoperative workup as follows:   - None; no further preoperative work-up is required    2. Medication Management/Recommendations:   - None, continue medication regimen including morning of surgery, with sip of water. Will hold Wegovy per surgical coordinator    3. Prophylaxis for cardiac events with perioperative beta-blockers: not indicated.    4. Patient requires further consultation with: None    Clearance  Patient is CLEARED for surgery without any additional cardiac testing.     Shannon Murrieta PA-C  [facility]  [address] [facility] [place] [address] [address]  Phone#  [phone]  Fax#  [phone]

## 2024-09-30 NOTE — PRE-PROCEDURE INSTRUCTIONS
Pre-Surgery Instructions:   Medication Instructions    acetaminophen (TYLENOL) 500 mg tablet Uses PRN- OK to take day of surgery-if needed may take with sip of water     ALPRAZolam (XANAX) 0.25 mg tablet Uses PRN- OK to take day of surgery-if needed may take with sip of water     ascorbic acid (VITAMIN C) 500 MG tablet Surgeon prescribed -instructed to use up to and including 10/16    cholecalciferol (VITAMIN D3) 1,000 units tablet Surgeon prescribed -instructed to use up to and including 10/16    eletriptan (RELPAX) 40 MG tablet Uses PRN- OK to take day of surgery- if needed may take with sip of water     folic acid (FOLVITE) 1 mg tablet Surgeon prescribed -instructed to use up to and including 10/16    levonorgestrel (MIRENA) 20 MCG/24HR IUD  IUD is intact/in place at this time     Multiple Vitamins-Minerals (multivitamin with minerals) tablet Surgeon prescribed -instructed to use up to and including 10/16    ondansetron (ZOFRAN) 4 mg tablet Uses PRN- OK to take day of surgery-with sip of water     Semaglutide-Weight Management (WEGOVY) 1.7 MG/0.75ML Stop taking 7 days prior to surgery.including DOS - pt reports takes Tuesdays - last dose to be taken 10/8/24     venlafaxine (EFFEXOR-XR) 150 mg 24 hr capsule Take day of surgery. With sip of water     Pt reports no further use of any prescription medications or OTC use of supplements/vitamins or herbals at time of the PAT call.    Pt instructed on use of cleanser for DOS and instructions for showering both night before and DOS reviewed with pt - pt verbalized understanding of instructions given  Pt also instructed on no hair or body products on skin for DOS.  No shaving with a razor blade for 7 days prior to surgery to decrease any incident of infection - electric razor is ok to use up till 24 hrs prior to DOS.  Pt instructed that if with any changes in health status between now and DOS - notify surgeon office.  Tylenol is ok to use as needed up to and including DOS  with sips of water - if needed - did instruct NO NSAIDS to be used at least 3 days prior to surgery - or if given a longer hold time of NSAIDS from MD please follow MD hold instructions.  Instructed to bring photo ID and medical insurance card for DOS as forms of identification for DOS.  Also instructed pt on no jewelry or body piercings, no valuables on body for DOS. Contact lenses, if worn - need to be removed for DOS.  Pt instructed does need transport home after surgery- pt is not allowed to drive.    Pt aware of [facility] - to be called if with any questions moving forward for DOS Aware to bring RW on DOS.  Given phone #   Has CHG wipes to be used on am DOS after final morning shower.    Medication instructions for day surgery reviewed. Please use only a sip of water to take your instructed medications. Avoid all over the counter vitamins, supplements and NSAIDS for one week prior to surgery per anesthesia guidelines. Tylenol is ok to take as needed.     You will receive a call one business day prior to surgery with an arrival time and hospital directions. If your surgery is scheduled on a Monday, the hospital will be calling you on the Friday prior to your surgery. If you have not heard from anyone by 8pm, please call the hospital supervisor through the hospital  at [phone]. ([place] [phone] or [place] [phone]).    Do not eat or drink anything after midnight the night before your surgery, including candy, mints, lifesavers, or chewing gum. Do not drink alcohol 24hrs before your surgery. Try not to smoke at least 24hrs before your surgery.       Follow the pre surgery showering instructions as listed in the “My Surgical Experience Booklet” or otherwise provided by your surgeon's office. Do not use a blade to shave the surgical area 1 week before surgery. It is okay to use a clean electric clippers up to 24 hours before surgery. Do not apply any lotions, creams,  including makeup, cologne, deodorant, or perfumes after showering on the day of your surgery. Do not use dry shampoo, hair spray, hair gel, or any type of hair products.     No contact lenses, eye make-up, or artificial eyelashes. Remove nail polish, including gel polish, and any artificial, gel, or acrylic nails if possible. Remove all jewelry including rings and body piercing jewelry.     Wear causal clothing that is easy to take on and off. Consider your type of surgery.    Keep any valuables, jewelry, piercings at home. Please bring any specially ordered equipment (sling, braces) if indicated.    Arrange for a responsible person to drive you to and from the hospital on the day of your surgery. Please confirm the visitor policy for the day of your procedure when you receive your phone call with an arrival time.     Call the surgeon's office with any new illnesses, exposures, or additional questions prior to surgery.    Please reference your “My Surgical Experience Booklet” for additional information to prepare for your upcoming surgery.

## 2024-10-03 ENCOUNTER — ANESTHESIA EVENT (OUTPATIENT)
Age: 39
End: 2024-10-03
Payer: COMMERCIAL

## 2024-10-11 ENCOUNTER — EVALUATION (OUTPATIENT)
Dept: PHYSICAL THERAPY | Facility: CLINIC | Age: 39
End: 2024-10-11
Payer: COMMERCIAL

## 2024-10-11 DIAGNOSIS — M17.12 PRIMARY OSTEOARTHRITIS OF LEFT KNEE: Primary | ICD-10-CM

## 2024-10-11 PROCEDURE — 97161 PT EVAL LOW COMPLEX 20 MIN: CPT | Performed by: PHYSICAL THERAPIST

## 2024-10-11 NOTE — PROGRESS NOTES
"PT Evaluation     Today's date: 10/11/2024  Patient name: Kaitlynn Madera  : 1985  MRN: [id number]  Referring provider: Emi García PA-C  Dx:   Encounter Diagnosis     ICD-10-CM    1. Primary osteoarthritis of left knee  M17.12 Ambulatory referral to Physical Therapy                     Assessment    Assessment details: Pt is a 39 y.o. female who presents to outpatient PT pre-op TKA with pain, decreased rom, decreased strength and decreased functional mobility. She will benefit from skilled PT to address these deficits in order to achieve her goals and maximize her functional mobility. All question regarding post-op care have answered. She has been provided with an initial hep to address current strength and rom deficits. She is scheduled for her first post-op appointment. Thank you for this referral..          Goals  Short Term Goals:   Independent performance of initial hep  Decrease pain 2 points on VAS      Long Term Goals:  Independent performance of comprehensive hep  Work performance is returned to max level of function  Performance of IADL's is returned to max level of function  Performance in recreational activities is improved to max level of function  Able to walk community distances with no AD  Able to climb stair with reciprocal gait pattern and single rail.      Plan    Frequency: 2x week  Duration in weeks: 8        Subjective Evaluation    History of Present Illness  Mechanism of injury: Pt reports that she has been having increasing L knee pain. Reports that she has increased pain and instability when walking downhill. Reports that her knee feels very \"tight\". Recent history of R unicompartmental TKA that is improving well.   Reports that she would like to return to walking for recreation.    Patient Goals  Patient goals for therapy: decreased edema, decreased pain, increased motion, increased strength, independence with ADLs/IADLs, return to sport/leisure activities and return to " work    Pain  Current pain ratin  At worst pain ratin          Objective    L knee: Pre-op    ROM   Flexion: 108   Extension: -11    Strength:   Flexion:  /5   Extension:  /5    Min edema noted  Good quad set and no extension lag noted  Poor eccentric quad control noted with step down  Crepitus with arom         Precautions: R unicompartmental TKA, OA, total joint bundle protocol in chart      Manuals             prom                                                    Neuro Re-Ed                                                                                                        Ther Ex             Quad set             saq             Slr flexion             Prone TKE             Gastroc stretch             Hr/tr             Heel slides             Cone taps             Ther Activity             bike                          Gait Training                                       Modalities             Cp with extension hang

## 2024-10-15 NOTE — DISCHARGE INSTR - AVS FIRST PAGE
Dr. Gray Knee Replacement    What to Expect/Activity  It is normal to have some discomfort in your knee for several days to weeks.  You are weight bearing as tolerated to your operative leg with assist devices.  Please use crutches/walker when ambulating until your follow-up  Swelling and discomfort in the knee is normal for several days after surgery. For the first 2-3 days, use ice around the knee to help. Use for 20-30 minutes every 1-2 hours for 48 hours, while awake. You may continue beyond 48 hours as needed.  Place one or two pillows underneath your calf, not your knee, to reduce swelling.  Physical therapy on your own at home should start as soon as possible (see below). Please perform heel slides and extension exercises on your own as well (see diagram).  Please use incentive spirometer 10 times per hour while awake (see diagram).    Dressing/Wound Care/Bathing  You may remove your toe-to-groin dressing 24 hours after surgery. There will be a surgical dressing over your incision that stays in place until follow-up unless water gets under the bandage and then it should be removed.   You may start showering 24 hours after surgery, the surgical dressing will remain in place. Please pat the dressing dry. If you notice the dressing appears saturated or is starting to come off, please replace with dry dressing.  You can keep the dressing in place until follow-up in the office.   Do not place any creams, ointments or gels on or around the incision.  No baths, swimming or submerging until cleared by Dr. Gray    Pain Management/Medications  You may resume your usual medications.  Please take the following medications:  Anti-coagulation (blood clot prevention) - aspirin 81mg twice daily for 4 weeks  Pain medication:  Narcotic: Take as directed  NSAID/Anti-inflammatory: Take as directed  Tylenol 1000mg every 8 hours  Zofran (ondasetron) - 4mg every 8 hours as needed for nausea  Stool softeners (senna/colace) -  take daily to prevent constipation as narcotic pain medication causes constipation  Antibiotic - take as directed if prescribed   If you have questions or pain concerns, please contact the office. Pain medication cannot remove all post-operative pain.    Follow up/Call if:  The findings of your surgery will be explained to you and your family immediately after surgery. However, in the post-operative period, during recovery from anesthesia you may not fully remember or fully understand what was said. This will be again gone over when you return for your post-op appointment.  Please contact Dr. Gray's office if you experience the following:  Excessive bleeding (bleeding through your dressing)  Fever greater than 101 degrees F after 48 hours (low grade fevers the day or two after surgery are normal)  Persistent nausea or vomiting  Decreased sensation or discoloration of the operative limb  Pain or swelling that is getting worse and not better with medication    Dr. Gray's Office Contact: [phone]

## 2024-10-16 RX ORDER — SODIUM CHLORIDE, SODIUM LACTATE, POTASSIUM CHLORIDE, CALCIUM CHLORIDE 600; 310; 30; 20 MG/100ML; MG/100ML; MG/100ML; MG/100ML
125 INJECTION, SOLUTION INTRAVENOUS CONTINUOUS
Status: CANCELLED | OUTPATIENT
Start: 2024-10-16

## 2024-10-17 ENCOUNTER — ANESTHESIA (OUTPATIENT)
Age: 39
End: 2024-10-17
Payer: COMMERCIAL

## 2024-10-17 ENCOUNTER — HOSPITAL ENCOUNTER (OUTPATIENT)
Age: 39
Setting detail: OUTPATIENT SURGERY
Discharge: HOME | End: 2024-10-17
Attending: STUDENT IN AN ORGANIZED HEALTH CARE EDUCATION/TRAINING PROGRAM | Admitting: STUDENT IN AN ORGANIZED HEALTH CARE EDUCATION/TRAINING PROGRAM
Payer: COMMERCIAL

## 2024-10-17 ENCOUNTER — APPOINTMENT (OUTPATIENT)
Age: 39
End: 2024-10-17
Payer: COMMERCIAL

## 2024-10-17 VITALS
TEMPERATURE: 97.5 F | DIASTOLIC BLOOD PRESSURE: 93 MMHG | WEIGHT: 214 LBS | SYSTOLIC BLOOD PRESSURE: 169 MMHG | HEIGHT: 63 IN | OXYGEN SATURATION: 98 % | HEART RATE: 98 BPM | BODY MASS INDEX: 37.92 KG/M2 | RESPIRATION RATE: 16 BRPM

## 2024-10-17 DIAGNOSIS — M17.12 PRIMARY OSTEOARTHRITIS OF LEFT KNEE: Primary | ICD-10-CM

## 2024-10-17 PROBLEM — R11.2 PONV (POSTOPERATIVE NAUSEA AND VOMITING): Status: ACTIVE | Noted: 2024-10-17

## 2024-10-17 PROBLEM — E66.9 OBESITY (BMI 30-39.9): Status: ACTIVE | Noted: 2024-10-17

## 2024-10-17 PROBLEM — Z98.890 PONV (POSTOPERATIVE NAUSEA AND VOMITING): Status: ACTIVE | Noted: 2024-10-17

## 2024-10-17 LAB
EXT PREGNANCY TEST URINE: NEGATIVE
EXT. CONTROL: NORMAL
GLUCOSE SERPL-MCNC: 99 MG/DL (ref 65–140)

## 2024-10-17 PROCEDURE — 82948 REAGENT STRIP/BLOOD GLUCOSE: CPT

## 2024-10-17 PROCEDURE — 97163 PT EVAL HIGH COMPLEX 45 MIN: CPT | Performed by: PHYSICAL THERAPIST

## 2024-10-17 PROCEDURE — 27446 REVISION OF KNEE JOINT: CPT | Performed by: PHYSICIAN ASSISTANT

## 2024-10-17 PROCEDURE — 81025 URINE PREGNANCY TEST: CPT | Performed by: STUDENT IN AN ORGANIZED HEALTH CARE EDUCATION/TRAINING PROGRAM

## 2024-10-17 PROCEDURE — C9290 INJ, BUPIVACAINE LIPOSOME: HCPCS | Performed by: ANESTHESIOLOGY

## 2024-10-17 PROCEDURE — C1776 JOINT DEVICE (IMPLANTABLE): HCPCS | Performed by: STUDENT IN AN ORGANIZED HEALTH CARE EDUCATION/TRAINING PROGRAM

## 2024-10-17 PROCEDURE — 73560 X-RAY EXAM OF KNEE 1 OR 2: CPT

## 2024-10-17 PROCEDURE — 27446 REVISION OF KNEE JOINT: CPT | Performed by: STUDENT IN AN ORGANIZED HEALTH CARE EDUCATION/TRAINING PROGRAM

## 2024-10-17 PROCEDURE — C1713 ANCHOR/SCREW BN/BN,TIS/BN: HCPCS | Performed by: STUDENT IN AN ORGANIZED HEALTH CARE EDUCATION/TRAINING PROGRAM

## 2024-10-17 DEVICE — SMARTSET HIGH PERFORMANCE MV MEDIUM VISCOSITY BONE CEMENT 40G
Type: IMPLANTABLE DEVICE | Site: KNEE | Status: FUNCTIONAL
Brand: SMARTSET

## 2024-10-17 DEVICE — IMPLANTABLE DEVICE
Type: IMPLANTABLE DEVICE | Site: KNEE | Status: FUNCTIONAL
Brand: OXFORD PARTIAL KNEE SYSTEM

## 2024-10-17 DEVICE — ANATOMIC MENISCAL BEARINGLEFT MEDIAL
Type: IMPLANTABLE DEVICE | Site: KNEE | Status: FUNCTIONAL
Brand: OXFORD PARTIAL KNEE SYSTEM

## 2024-10-17 DEVICE — TWIN PEG FEMORAL
Type: IMPLANTABLE DEVICE | Site: KNEE | Status: FUNCTIONAL
Brand: OXFORD PARTIAL KNEE SYSTEM

## 2024-10-17 RX ORDER — HYDROMORPHONE HCL IN WATER/PF 6 MG/30 ML
0.2 PATIENT CONTROLLED ANALGESIA SYRINGE INTRAVENOUS
Status: DISCONTINUED | OUTPATIENT
Start: 2024-10-17 | End: 2024-10-17 | Stop reason: HOSPADM

## 2024-10-17 RX ORDER — ACETAMINOPHEN 325 MG/1
975 TABLET ORAL ONCE
Status: COMPLETED | OUTPATIENT
Start: 2024-10-17 | End: 2024-10-17

## 2024-10-17 RX ORDER — CHLORHEXIDINE GLUCONATE 40 MG/ML
SOLUTION TOPICAL DAILY PRN
Status: DISCONTINUED | OUTPATIENT
Start: 2024-10-17 | End: 2024-10-17 | Stop reason: HOSPADM

## 2024-10-17 RX ORDER — ONDANSETRON 2 MG/ML
4 INJECTION INTRAMUSCULAR; INTRAVENOUS EVERY 6 HOURS PRN
Status: CANCELLED | OUTPATIENT
Start: 2024-10-17

## 2024-10-17 RX ORDER — DEXAMETHASONE SODIUM PHOSPHATE 10 MG/ML
INJECTION, SOLUTION INTRAMUSCULAR; INTRAVENOUS AS NEEDED
Status: DISCONTINUED | OUTPATIENT
Start: 2024-10-17 | End: 2024-10-17

## 2024-10-17 RX ORDER — SCOLOPAMINE TRANSDERMAL SYSTEM 1 MG/1
1 PATCH, EXTENDED RELEASE TRANSDERMAL ONCE
Status: DISCONTINUED | OUTPATIENT
Start: 2024-10-17 | End: 2024-10-17 | Stop reason: HOSPADM

## 2024-10-17 RX ORDER — HYDROMORPHONE HCL/PF 1 MG/ML
0.5 SYRINGE (ML) INJECTION
Status: DISCONTINUED | OUTPATIENT
Start: 2024-10-17 | End: 2024-10-17 | Stop reason: HOSPADM

## 2024-10-17 RX ORDER — PROPOFOL 10 MG/ML
INJECTION, EMULSION INTRAVENOUS AS NEEDED
Status: DISCONTINUED | OUTPATIENT
Start: 2024-10-17 | End: 2024-10-17

## 2024-10-17 RX ORDER — FOLIC ACID 1 MG/1
1 TABLET ORAL DAILY
Status: CANCELLED | OUTPATIENT
Start: 2024-10-17

## 2024-10-17 RX ORDER — SODIUM CHLORIDE, SODIUM LACTATE, POTASSIUM CHLORIDE, CALCIUM CHLORIDE 600; 310; 30; 20 MG/100ML; MG/100ML; MG/100ML; MG/100ML
INJECTION, SOLUTION INTRAVENOUS CONTINUOUS PRN
Status: DISCONTINUED | OUTPATIENT
Start: 2024-10-17 | End: 2024-10-17

## 2024-10-17 RX ORDER — HYDRALAZINE HYDROCHLORIDE 20 MG/ML
5 INJECTION INTRAMUSCULAR; INTRAVENOUS
Status: DISCONTINUED | OUTPATIENT
Start: 2024-10-17 | End: 2024-10-17 | Stop reason: HOSPADM

## 2024-10-17 RX ORDER — GABAPENTIN 300 MG/1
300 CAPSULE ORAL
Status: CANCELLED | OUTPATIENT
Start: 2024-10-17

## 2024-10-17 RX ORDER — AMOXICILLIN 250 MG
1 CAPSULE ORAL DAILY
Qty: 30 TABLET | Refills: 0 | Status: SHIPPED | OUTPATIENT
Start: 2024-10-17

## 2024-10-17 RX ORDER — SODIUM CHLORIDE, SODIUM LACTATE, POTASSIUM CHLORIDE, CALCIUM CHLORIDE 600; 310; 30; 20 MG/100ML; MG/100ML; MG/100ML; MG/100ML
125 INJECTION, SOLUTION INTRAVENOUS CONTINUOUS
Status: DISCONTINUED | OUTPATIENT
Start: 2024-10-17 | End: 2024-10-17 | Stop reason: HOSPADM

## 2024-10-17 RX ORDER — DOCUSATE SODIUM 100 MG/1
100 CAPSULE, LIQUID FILLED ORAL 2 TIMES DAILY
Status: CANCELLED | OUTPATIENT
Start: 2024-10-17

## 2024-10-17 RX ORDER — PROPOFOL 10 MG/ML
INJECTION, EMULSION INTRAVENOUS CONTINUOUS PRN
Status: DISCONTINUED | OUTPATIENT
Start: 2024-10-17 | End: 2024-10-17

## 2024-10-17 RX ORDER — ONDANSETRON 2 MG/ML
4 INJECTION INTRAMUSCULAR; INTRAVENOUS ONCE AS NEEDED
Status: DISCONTINUED | OUTPATIENT
Start: 2024-10-17 | End: 2024-10-17 | Stop reason: HOSPADM

## 2024-10-17 RX ORDER — SENNOSIDES 8.6 MG
1 TABLET ORAL DAILY
Status: CANCELLED | OUTPATIENT
Start: 2024-10-17

## 2024-10-17 RX ORDER — PANTOPRAZOLE SODIUM 40 MG/1
40 TABLET, DELAYED RELEASE ORAL DAILY
Status: CANCELLED | OUTPATIENT
Start: 2024-10-17

## 2024-10-17 RX ORDER — ASCORBIC ACID 500 MG
500 TABLET ORAL 2 TIMES DAILY
Status: CANCELLED | OUTPATIENT
Start: 2024-10-17

## 2024-10-17 RX ORDER — DICLOFENAC SODIUM 75 MG/1
75 TABLET, DELAYED RELEASE ORAL 2 TIMES DAILY
Qty: 60 TABLET | Refills: 1 | Status: SHIPPED | OUTPATIENT
Start: 2024-10-17

## 2024-10-17 RX ORDER — OXYCODONE HYDROCHLORIDE 5 MG/1
5 TABLET ORAL ONCE
Status: COMPLETED | OUTPATIENT
Start: 2024-10-17 | End: 2024-10-17

## 2024-10-17 RX ORDER — TRANEXAMIC ACID 10 MG/ML
1000 INJECTION, SOLUTION INTRAVENOUS ONCE
Status: COMPLETED | OUTPATIENT
Start: 2024-10-17 | End: 2024-10-17

## 2024-10-17 RX ORDER — CEFADROXIL 500 MG/1
500 CAPSULE ORAL EVERY 12 HOURS SCHEDULED
Qty: 10 CAPSULE | Refills: 0 | Status: SHIPPED | OUTPATIENT
Start: 2024-10-17 | End: 2024-10-22

## 2024-10-17 RX ORDER — ACETAMINOPHEN 500 MG
1000 TABLET ORAL EVERY 8 HOURS
Qty: 60 TABLET | Refills: 0 | Status: SHIPPED | OUTPATIENT
Start: 2024-10-17

## 2024-10-17 RX ORDER — HYDROMORPHONE HCL/PF 1 MG/ML
SYRINGE (ML) INJECTION AS NEEDED
Status: DISCONTINUED | OUTPATIENT
Start: 2024-10-17 | End: 2024-10-17

## 2024-10-17 RX ORDER — PROMETHAZINE HYDROCHLORIDE 25 MG/ML
12.5 INJECTION, SOLUTION INTRAMUSCULAR; INTRAVENOUS ONCE AS NEEDED
Status: DISCONTINUED | OUTPATIENT
Start: 2024-10-17 | End: 2024-10-17 | Stop reason: HOSPADM

## 2024-10-17 RX ORDER — ACETAMINOPHEN 325 MG/1
975 TABLET ORAL EVERY 8 HOURS
Status: CANCELLED | OUTPATIENT
Start: 2024-10-17

## 2024-10-17 RX ORDER — OXYCODONE HYDROCHLORIDE 10 MG/1
10 TABLET ORAL EVERY 4 HOURS PRN
Status: CANCELLED | OUTPATIENT
Start: 2024-10-17

## 2024-10-17 RX ORDER — CHLORHEXIDINE GLUCONATE ORAL RINSE 1.2 MG/ML
15 SOLUTION DENTAL ONCE
Status: COMPLETED | OUTPATIENT
Start: 2024-10-17 | End: 2024-10-17

## 2024-10-17 RX ORDER — ONDANSETRON 4 MG/1
4 TABLET, ORALLY DISINTEGRATING ORAL EVERY 6 HOURS PRN
Qty: 20 TABLET | Refills: 0 | Status: SHIPPED | OUTPATIENT
Start: 2024-10-17

## 2024-10-17 RX ORDER — BUPIVACAINE HYDROCHLORIDE 5 MG/ML
INJECTION, SOLUTION EPIDURAL; INTRACAUDAL
Status: COMPLETED | OUTPATIENT
Start: 2024-10-17 | End: 2024-10-17

## 2024-10-17 RX ORDER — MIDAZOLAM HYDROCHLORIDE 2 MG/2ML
INJECTION, SOLUTION INTRAMUSCULAR; INTRAVENOUS
Status: COMPLETED | OUTPATIENT
Start: 2024-10-17 | End: 2024-10-17

## 2024-10-17 RX ORDER — CEFAZOLIN SODIUM 2 G/50ML
2000 SOLUTION INTRAVENOUS EVERY 8 HOURS
Status: CANCELLED | OUTPATIENT
Start: 2024-10-17 | End: 2024-10-18

## 2024-10-17 RX ORDER — OXYCODONE HYDROCHLORIDE 5 MG/1
5 TABLET ORAL EVERY 4 HOURS PRN
Qty: 42 TABLET | Refills: 0 | Status: SHIPPED | OUTPATIENT
Start: 2024-10-17 | End: 2024-10-27

## 2024-10-17 RX ORDER — CEFAZOLIN SODIUM 2 G/50ML
2000 SOLUTION INTRAVENOUS ONCE
Status: COMPLETED | OUTPATIENT
Start: 2024-10-17 | End: 2024-10-17

## 2024-10-17 RX ORDER — LABETALOL HYDROCHLORIDE 5 MG/ML
10 INJECTION, SOLUTION INTRAVENOUS
Status: DISCONTINUED | OUTPATIENT
Start: 2024-10-17 | End: 2024-10-17 | Stop reason: HOSPADM

## 2024-10-17 RX ORDER — CALCIUM CARBONATE 500 MG/1
1000 TABLET, CHEWABLE ORAL DAILY PRN
Status: CANCELLED | OUTPATIENT
Start: 2024-10-17

## 2024-10-17 RX ORDER — SODIUM CHLORIDE, SODIUM LACTATE, POTASSIUM CHLORIDE, CALCIUM CHLORIDE 600; 310; 30; 20 MG/100ML; MG/100ML; MG/100ML; MG/100ML
100 INJECTION, SOLUTION INTRAVENOUS CONTINUOUS
Status: CANCELLED | OUTPATIENT
Start: 2024-10-17

## 2024-10-17 RX ORDER — ALBUTEROL SULFATE 0.83 MG/ML
2.5 SOLUTION RESPIRATORY (INHALATION) ONCE AS NEEDED
Status: DISCONTINUED | OUTPATIENT
Start: 2024-10-17 | End: 2024-10-17 | Stop reason: HOSPADM

## 2024-10-17 RX ORDER — METOCLOPRAMIDE HYDROCHLORIDE 5 MG/ML
10 INJECTION INTRAMUSCULAR; INTRAVENOUS ONCE AS NEEDED
Status: DISCONTINUED | OUTPATIENT
Start: 2024-10-17 | End: 2024-10-17 | Stop reason: HOSPADM

## 2024-10-17 RX ORDER — SIMETHICONE 80 MG
80 TABLET,CHEWABLE ORAL 4 TIMES DAILY PRN
Status: CANCELLED | OUTPATIENT
Start: 2024-10-17

## 2024-10-17 RX ORDER — ONDANSETRON 2 MG/ML
INJECTION INTRAMUSCULAR; INTRAVENOUS AS NEEDED
Status: DISCONTINUED | OUTPATIENT
Start: 2024-10-17 | End: 2024-10-17

## 2024-10-17 RX ORDER — OXYCODONE HYDROCHLORIDE 5 MG/1
5 TABLET ORAL EVERY 4 HOURS PRN
Status: DISCONTINUED | OUTPATIENT
Start: 2024-10-17 | End: 2024-10-17 | Stop reason: HOSPADM

## 2024-10-17 RX ORDER — MAGNESIUM HYDROXIDE 1200 MG/15ML
LIQUID ORAL AS NEEDED
Status: DISCONTINUED | OUTPATIENT
Start: 2024-10-17 | End: 2024-10-17 | Stop reason: HOSPADM

## 2024-10-17 RX ORDER — ACETAMINOPHEN 325 MG/1
650 TABLET ORAL EVERY 4 HOURS PRN
Status: CANCELLED | OUTPATIENT
Start: 2024-10-17

## 2024-10-17 RX ORDER — FENTANYL CITRATE/PF 50 MCG/ML
25 SYRINGE (ML) INJECTION
Status: DISCONTINUED | OUTPATIENT
Start: 2024-10-17 | End: 2024-10-17 | Stop reason: HOSPADM

## 2024-10-17 RX ADMIN — MIDAZOLAM 2 MG: 1 INJECTION INTRAMUSCULAR; INTRAVENOUS at 10:25

## 2024-10-17 RX ADMIN — BUPIVACAINE 20 ML: 13.3 INJECTION, SUSPENSION, LIPOSOMAL INFILTRATION at 10:30

## 2024-10-17 RX ADMIN — SODIUM CHLORIDE, SODIUM LACTATE, POTASSIUM CHLORIDE, AND CALCIUM CHLORIDE: .6; .31; .03; .02 INJECTION, SOLUTION INTRAVENOUS at 10:21

## 2024-10-17 RX ADMIN — ONDANSETRON 4 MG: 2 INJECTION INTRAMUSCULAR; INTRAVENOUS at 11:41

## 2024-10-17 RX ADMIN — BUPIVACAINE HYDROCHLORIDE 10 ML: 5 INJECTION, SOLUTION EPIDURAL; INTRACAUDAL; PERINEURAL at 10:30

## 2024-10-17 RX ADMIN — MIDAZOLAM 2 MG: 1 INJECTION INTRAMUSCULAR; INTRAVENOUS at 11:04

## 2024-10-17 RX ADMIN — MEPIVACAINE HYDROCHLORIDE 3 ML: 15 INJECTION, SOLUTION EPIDURAL; INFILTRATION at 11:19

## 2024-10-17 RX ADMIN — TRANEXAMIC ACID 1000 MG: 10 INJECTION, SOLUTION INTRAVENOUS at 11:30

## 2024-10-17 RX ADMIN — HYDROMORPHONE HYDROCHLORIDE 0.5 MG: 1 INJECTION, SOLUTION INTRAMUSCULAR; INTRAVENOUS; SUBCUTANEOUS at 12:53

## 2024-10-17 RX ADMIN — CHLORHEXIDINE GLUCONATE 15 ML: 1.2 RINSE ORAL at 10:04

## 2024-10-17 RX ADMIN — ACETAMINOPHEN 325MG 975 MG: 325 TABLET ORAL at 10:04

## 2024-10-17 RX ADMIN — CEFAZOLIN SODIUM 2000 MG: 2 SOLUTION INTRAVENOUS at 11:20

## 2024-10-17 RX ADMIN — SODIUM CHLORIDE, SODIUM LACTATE, POTASSIUM CHLORIDE, AND CALCIUM CHLORIDE 125 ML/HR: .6; .31; .03; .02 INJECTION, SOLUTION INTRAVENOUS at 10:04

## 2024-10-17 RX ADMIN — PROPOFOL 70 MCG/KG/MIN: 10 INJECTION, EMULSION INTRAVENOUS at 11:21

## 2024-10-17 RX ADMIN — DEXAMETHASONE SODIUM PHOSPHATE 10 MG: 10 INJECTION INTRAMUSCULAR; INTRAVENOUS at 11:41

## 2024-10-17 RX ADMIN — PROPOFOL 30 MG: 10 INJECTION, EMULSION INTRAVENOUS at 11:21

## 2024-10-17 RX ADMIN — OXYCODONE 5 MG: 5 TABLET ORAL at 17:29

## 2024-10-17 RX ADMIN — OXYCODONE 5 MG: 5 TABLET ORAL at 15:10

## 2024-10-17 RX ADMIN — SCOPOLAMINE 1 PATCH: 1.5 PATCH, EXTENDED RELEASE TRANSDERMAL at 10:04

## 2024-10-17 NOTE — OP NOTE
OPERATIVE REPORT  PATIENT NAME: Kaitlynn Madera  : 1985  MRN: [id number]  Pt Location:  [facility] OR ROOM 04    Surgery Date: 10/17/2024    Surgeons and Role:     * León Gray, DO - Primary     * SANDI MorseC - Assisting      * Mirna Brennan OT-C - Assisting     Preop Diagnosis:  Primary osteoarthritis of left knee [M17.12]    Post-Op Diagnosis Codes:     * Primary osteoarthritis of left knee [M17.12]    Procedure(s):  Left - ARTHROPLASTY KNEE UNICOMPARTMENTAL    Specimens:  * No specimens in log *    Estimated Blood Loss:   50 cc    Drains:  * No LDAs found *    Anesthesia Type:   Spinal      Operative Indications:  Primary osteoarthritis of left knee [M17.12]    39F with left knee pain 2/2 severe osteoarthritis localized to the medial compartment. She has failed extensive non-operative management. Her pain continues to limit her ADLs and what she would like to do. She has recovered very well after right medial UKA. The patient has elected to proceed with left medial unicompartmental knee arthroplasty vs TKA. Risks and benefits of surgery to include but not limited to bleeding, infection, damage to surrounding structures, hardware failure, instability, fracture, dislocation, need for further surgery, continued pain, stiffness, blood clots, stroke, and heart attack was discussed with the patient.     Operative Findings:  Grade IV changes anteromedial tibia  No discernable wear in the patellofemoral or lateral compartments  Intact lateral meniscus  Intact ACL/PCL    Implant Name Type Inv. Item Serial No.  Lot No. LRB No. Used Action   CEMENT BONE SMART SET GRAY MED VISC - [id number]  CEMENT BONE SMART SET GRAY MED VISC  DEPUY [id number] Left 1 Implanted   TIBIAL TRAY SZ AA LM UNI [place] - [id number]  TIBIAL TRAY SZ AA LM UNI [place]  [employer] [id number] Left 1 Implanted   COMPONENT FEMORAL CMNT TWN PEG SM - [id number]  COMPONENT FEMORAL CMNT TWN PEG SM  [employer] [id number] Left 1  Implanted   INSERT TIBIAL BRNG SM SZ 3 LT [place] - [id number]  INSERT TIBIAL BRNG SM SZ 3 LT [place]  [employer] [id number] Left 1 Implanted         Complications:   None    Knee Technique: Suture (direct) Repair  Knee Approach: Medial Parapatellar, mini    Chronic Narcotic Use:  No      Procedure and Technique:  Patient was seen in the preoperative holding area.  Informed consent was confirmed and all questions were answered. Operative site was confirmed and marked. Patient was taken to the operating room and transferred to the operating room table. Anesthesia was performed. The patient was then placed supine and all bony prominences were well-padded. Left lower extremity was prepped and draped in usual sterile fashion with chlorhexidine scrub.  Patient was given perioperative antibiotics prior to incision and SCDs were placed on the non-operative leg.  A formal time-out was performed identifying the patient and confirmed operative site.  The knee was exsanguinated and a pneumatic tourniquet was inflated at 250 mmHg.  A slightly medial midline incision was performed from 1 fingerbreadth above the patella to the tibial tubercle.  This incision was carried down through skin and subcutaneous tissues to the level of the extensor mechanism.  A small medial skin flap was created.  A mini medial parapatellar approach was performed into the knee being careful to avoid the patellar tendon and intact cartilage. The anterior horn of the medial meniscus was released.  The medial peel was performed to the mid coronal line. At this time the lateral and patellofemoral joints were carefully inspected for wear and found to have no significant wear precluding unicompartmental arthroplasty. Peripheral osteophytes were removed at this time from the medial compartment. The femur was sized to a small per the patient's demographics and intraoperative measurement. The extramedullary tibial guide was placed perpendicular to the mechanical  axis and with appropriate slope. The size 3 G clamp was used to secure the cutting guide. Retractors were placed to protect the ACL and MCL. The tibia was cut with sagittal saw being careful to avoid the ACL and reciprocating saw being careful to avoid the MCL. The tibial cut was loosened with a straight osteotome and removed. The tibial was inspected and confirmed to have isolated anteromedial wear with intact posterior cartilage. The tibia was sized to an AA with no posterior or medial overhang. At this time the femoral canal drill was used followed by opening awl and IM andrea. The AP axis of the C was marked with a surgical marker. The small size femoral cutting guide was placed and attached to the IM andrea. The alignment was confirmed to be along the AP axis. The proximal and distal holes were drilled. The guide was removed and the size zero spiguet was placed. The distal femur was reamed. The trial was placed. The flexion gap was appropriate with a size 3. The extension gap was then assessed and found to be a 1. At the time the 2 spiguet was placed and the distal femur was reamed again. The nibler was then used to remove peripheral bone. The flexion and extension gaps were then assessed and found to be symmetric with a size 3 insert. At this time the final femoral prep with the rhino reamer and posterior osteotome was completed. At this time the tibia was prepared the appropriate size baseplate was pinned in place. The toothbrush blade was used to cut the trough. This was then cleaned of any residual bone. At this time the knee was again trialed with the above implants and found to have excellent stability with impingement of the bearing along the lateral tibial tray, no bearing spit-out and appropriate alignment. At this time all trials were removed and the knee was copiously irrigated. The distal femur was prepped with the small finishing drill for cement interdigitation. The bone surfaces were dried. The  tibial was cemented in place extruding the cement anteriorly. The tibia was then cleared of all excess cement. The femur was then cemented into place. The was then held at 45 degrees with axial pressure with the appropriate sized spacer block in place until cement was cured. The knee was then trialed with the above bearing and found to have excellent stability. The real size 3 bearing was implanted and the knee was taken through ROM. The tourniquet was released at 42 minutes and all bleeders were coagulated.  The knee was irrigated with the remainder of the 3 L of normal saline solution.  The joint local was injected in the posterior capsule and around the tissues of the knee.  The knee was taken through a final range of motion and found to have excellent stability and patellar tracking.  All instrument and sponge counts were correct x2.  The arthrotomy was closed with #1 Stratafix suture.  Subcutaneous tissues were closed with 2-0 Vicryl.  The skin was closed with 3-0 Stratafix followed by Dermabond.  A sterile Mepilex was placed along with a thigh foot Ace wrap.  Patient was awoken from anesthesia and taken to recovery room in stable condition.     I was present for all critical portions of the procedure., A qualified resident physician was not available., and A physician assistant was required during the procedure for retraction, tissue handling, dissection and suturing.    Patient Disposition:  PACU     39F s/p right medial UKA 3/7  - multi-modal pain control   - santiago-op abx x 24 hrs, duricef x 5 days as planned same day discharge   - DVT ppx: aspirin 81 mg BID x 4 weeks  - PT/OT  - WBAT  - ROM as tolerated, no pillows behind knee  - f/u 10-14 days        SIGNATURE: León Gray DO  DATE: October 17, 2024  TIME: 12:39 PM

## 2024-10-17 NOTE — ANESTHESIA PREPROCEDURE EVALUATION
Procedure:  ARTHROPLASTY KNEE UNICOMPARTMENTAL vs TKA- SAME DAY (Left: Knee)    Relevant Problems   ANESTHESIA   (+) Motion sickness   (+) PONV (postoperative nausea and vomiting)      CARDIO   (+) Migraine without aura and without status migrainosus, not intractable   (+) Mixed hyperlipidemia      MUSCULOSKELETAL   (+) Primary osteoarthritis of left knee      NEURO/PSYCH   (+) Depression with anxiety   (+) Migraine without aura and without status migrainosus, not intractable      Orthopedic/Musculoskeletal   (+) Radiculitis of right cervical region      Other   (+) Obesity (BMI 30-39.9)        Physical Exam    Airway    Mallampati score: II  TM Distance: >3 FB  Neck ROM: full     Dental   No notable dental hx     Cardiovascular  Rhythm: regular, Rate: normal, Cardiovascular exam normal    Pulmonary  Pulmonary exam normal Breath sounds clear to auscultation    Other Findings  post-pubertal.      Anesthesia Plan  ASA Score- 2     Anesthesia Type- spinal with ASA Monitors.         Additional Monitors:     Airway Plan:            Plan Factors-Exercise tolerance (METS): >4 METS.    Chart reviewed.   Existing labs reviewed. Patient summary reviewed.    Patient is not a current smoker.              Induction-     Postoperative Plan-         Informed Consent- Anesthetic plan and risks discussed with patient.  I personally reviewed this patient with the CRNA. Discussed and agreed on the Anesthesia Plan with the CRNA..

## 2024-10-17 NOTE — OCCUPATIONAL THERAPY NOTE
Occupational Therapy Screen       10/17/24 2398   OT Last Visit   OT Visit Date 10/17/24   Note Type   Note type Screen   Additional Comments Pt chart reviewed. OT consult recieved.  Spoke to pt, recently had R TKA done 3/7/2024, pt reporting did not have difficulty with ADLs/IADLs after surgery last time and has family support to assist if needed. Pt is currently functioning at baseline level post-op L uni knee with no acute OT needs at this time. Will d/c pt at this time and reassess if medically needed.     Missy Chan, OT      Patient Name: Kaitlynn Madera  Today's Date: 10/17/2024

## 2024-10-17 NOTE — ANESTHESIA PROCEDURE NOTES
Spinal Block    Patient location during procedure: OR  Start time: 10/17/2024 11:19 AM  Reason for block: procedure for pain and at surgeon's request  Staffing  Performed by: Ruiz Mccord MD  Authorized by: Ruiz Mccord MD    Preanesthetic Checklist  Completed: patient identified, IV checked, site marked, risks and benefits discussed, surgical consent, monitors and equipment checked, pre-op evaluation and timeout performed  Spinal Block  Patient position: sitting  Prep: ChloraPrep and site prepped and draped  Patient monitoring: frequent blood pressure checks, continuous pulse ox and heart rate  Approach: midline  Location: L3-4  Needle  Needle type: Pencan   Needle gauge: 24 G  Needle length: 4 in  Assessment  Sensory level: T4  Injection Assessment:  negative aspiration for heme, no paresthesia on injection and positive aspiration for clear CSF.  Post-procedure:  site cleaned

## 2024-10-17 NOTE — PHYSICAL THERAPY NOTE
PT EVALUATION    Pt. Name: Kaitlynn Madera  Pt. Age: 39 y.o.  MRN: [id number]  LENGTH OF STAY: 0    Patient Active Problem List   Diagnosis    Migraine without aura and without status migrainosus, not intractable    Motion sickness    Depression with anxiety    Radiculitis of right cervical region    IUD (intrauterine device) in place    Mixed hyperlipidemia    Primary osteoarthritis of right knee    Primary osteoarthritis of left knee    PONV (postoperative nausea and vomiting)    Obesity (BMI 30-39.9)       Admitting Diagnoses:   Primary osteoarthritis of left knee [M17.12]    Past Medical History:   Diagnosis Date    Anemia     hx of anemia - per pt was told with trying to donate blood    Anesthesia     2/12/24 per pt - reports with epidural use - pt experienced big BP drop    Anxiety     Blood type, Rh negative     Resolved 2/18/2016     COVID-19     9/30/24 per pt symptoms start 9/16- dx with COVID 9/19 - did see MD for MC 9/24 and cleared ( MD aware of COVID)    Kidney stone     Knee pain, left     Knee pain, right     Migraine     Obesity     PONV (postoperative nausea and vomiting)        Past Surgical History:   Procedure Laterality Date    EAR SURGERY      Last impression 8/5/2015     MYRINGOTOMY      RI ARTHRP KNEE CONDYLE&PLATEAU MEDIAL/LAT CMPRT Right 3/7/2024    Procedure: ARTHROPLASTY KNEE UNICOMPARTMENTAL;  Surgeon: León Gray DO;  Location:  MAIN OR;  Service: Orthopedics    TONSILLECTOMY      WISDOM TOOTH EXTRACTION         Imaging Studies:  XR knee left 1 or 2 views    (Results Pending)        10/17/24 1727   PT Last Visit   PT Visit Date 10/17/24   Note Type   Note type Evaluation   Additional Comments pt greeted supine   Pain Assessment   Pain Assessment Tool 0-10   Pain Score 7   Pain Location/Orientation Orientation: Left;Location: Knee   Hospital Pain Intervention(s) Repositioned;Ambulation/increased activity;Elevated;Emotional support;Rest   Restrictions/Precautions    Weight Bearing Precautions Per Order Yes   LLE Weight Bearing Per Order WBAT   Other Precautions Fall Risk;Pain;WBS   Home Living   Type of Home House   Home Layout Two level;1/2 bath on main level;Performs ADLs on one level;Able to live on main level with bedroom/bathroom;Stairs to enter with rails  (3 SEEMA with R rail in the back)   Bathroom Shower/Tub Walk-in shower   Bathroom Toilet Standard   Bathroom Equipment Built-in shower seat;Commode   Bathroom Accessibility Accessible   Home Equipment Walker;Cane   Prior Function   Level of [place] Independent with ADLs;Independent with functional mobility;Independent with IADLS   Lives With Spouse;Family   Receives Help From Family   IADLs Independent with driving;Independent with meal prep;Independent with medication management   Falls in the last 6 months 0   Vocational Other (Comment)   General   Family/Caregiver Present Yes   Cognition   Overall Cognitive Status WFL   Arousal/Participation Alert   Orientation Level Oriented X4   Following Commands Follows one step commands without difficulty   RUE Assessment   RUE Assessment WFL   LUE Assessment   LUE Assessment WFL   RLE Assessment   RLE Assessment WFL   LLE Assessment   LLE Assessment X  (did not assess knee due to post op, able to flex hip and move ankle through normal ROM)   Light Touch   RLE Light Touch Grossly intact   LLE Light Touch Grossly intact   Bed Mobility   Supine to Sit 5  Supervision   Additional items Verbal cues;Increased time required;HOB elevated;Bedrails   Sit to Supine Unable to assess   Additional Comments pt greeted in supine   Transfers   Sit to Stand 5  Supervision   Additional items Increased time required;Verbal cues  (RW)   Stand to Sit 5  Supervision   Additional items Increased time required;Verbal cues  (RW)   Additional Comments cues for hand placement.  VITALS BP seated /109, standing 167/104 (-) symptoms   Ambulation/Elevation   Gait pattern Improper Weight  shift;Decreased L stance;Antalgic;Short stride;Excessively slow;Decreased toe off;Decreased heel strike;Decreased hip extension   Gait Assistance 5  Supervision   Additional items Verbal cues   Assistive Device Rolling walker   Distance 80'x2   Stair Management Assistance 5  Supervision   Additional items Increased time required;Verbal cues   Stair Management Technique No rails;Step to pattern;Foreward;With walker   Number of Stairs 4   Ambulation/Elevation Additional Comments cues for LE sequencing during stairs   Balance   Static Sitting Good   Dynamic Sitting Fair +   Static Standing Fair   Dynamic Standing Fair -   Ambulatory Fair -   Endurance Deficit   Endurance Deficit Yes   Endurance Deficit Description fatigue and pain   Activity Tolerance   Activity Tolerance Patient tolerated treatment well   Medical Staff Made Aware RN Erum   Nurse Made Aware yes   Assessment   Prognosis Good   Problem List Decreased strength;Decreased range of motion;Decreased endurance;Impaired balance;Decreased mobility;Orthopedic restrictions;Pain   Assessment Pt is a 39 y.o. female who [facility]17[facility]24 s/p L  Unicompartmental Arthroplasty done by Dr. Gray. Precautions include WBAT. Pt  has a past medical history of Anemia, Anesthesia, Anxiety, Blood type, Rh negative, COVID-19, Kidney stone, Knee pain, left, Knee pain, right, Migraine, Obesity, and PONV (postoperative nausea and vomiting).. Pt greeted at bedside for PT evaluation on 10/17/24. Pt referred to PT for functional mobility evaluation & D/C planning w/ orders of activity beginning POD #0 and activity as tolerated. PTA, pt reports being I w/o AD and I w/ IADLs. Personal factors affecting pt at time of IE include: lives in 2 story house and stairs to enter home. Please find objective findings from PT assessment regarding body systems outlined above with impairments and limitations including weakness, decreased ROM, impaired balance, decreased endurance,  gait deviations, pain, decreased activity tolerance, decreased functional mobility tolerance, fall risk, and orthopedic restrictions.  Please see flow sheet above for objective findings and level of assistance required for safe completion of functional tasks. Pt able to perform supine to sit with S and use of bedrails. Pt performed sit<>stand with RW and S. Pt then ambulated 80'x2 with RW and S. No buckling or LOB noted. Pt performed 4 steps with S and use of RW. Cues needed for proper sequencing, but good carryover. Vitals taken t/o session, RN aware.  Pt demonstrated dec endurance and tolerance to activity. Denies reports of dizziness or SOB t/o session. Patient was left  seated EOB  with call bell and all needs within reach. Pt was educated on fall precautions and reinforced w/ good understanding. Based on pt presentation and impaired function, pt would benefit from level III, (minimal resource intensity) at D/C. From PT/mobility standpoint, pt would benefit from OPPT to address deficits as defined above and maximize level of independence and return pt to PLOF. HEP given and reviewed with patient, no questions at this time. CM to follow. Nsg staff to continue to mobilized pt (OOB in chair for all meals & ambulate in room/unit) as tolerated to prevent further decline in function. Nsg notified. Co-eval performed with OT to complete this evaluation for the pts best interest given pts medical complexity and functional level.   Barriers to Discharge None   Goals   Patient Goals to go home   STG Expiration Date 10/24/24   Short Term Goal #1 1).  Pt will perform bed mobility with Óscar demonstrating appropriate technique 100% of the time in order to improve function.2)  Perform all transfers with Óscar demonstrating safe and appropriate technique 100% of the time in order to improve ability to negotiate safely in home environment.3) Amb with least restrictive AD > 200'x1 with Óscar in order to demonstrate ability to  negotiate in home environment.4)  Improve overall strength and balance 1/2 grade in order to optimize ability to perform functional tasks and reduce fall risk.5) Increase activity tolerance to 45 minutes in order to improve endurance to functional tasks.6)  Negotiate stairs using most appropriate technique and Óscar in order to be able to negotiate safely in home environment. 7) PT for ongoing patient and family/caregiver education, DME needs and d/c planning in order to promote highest level of function in least restrictive environment.   Plan   Treatment/Interventions Functional transfer training;LE strengthening/ROM;Elevations;Therapeutic exercise;Endurance training;Patient/family training;Bed mobility;Gait training;Spoke to nursing;OT;Family   PT Frequency Twice a day  (prn)   Discharge Recommendation   Rehab Resource Intensity Level, PT III (Minimum Resource Intensity)   Equipment Recommended Walker  (pt owns)   Additional Comments The patient's AM-PAC Basic Mobility Inpatient Short Form Raw Score is 22. A Raw score of greater than 16 suggests the patient may benefit from discharge to home. Please also refer to the recommendation of the Physical Therapist for safe discharge planning.   AM-PAC Basic Mobility Inpatient   Turning in Flat Bed Without Bedrails 4   Lying on Back to Sitting on Edge of Flat Bed Without Bedrails 4   Moving Bed to Chair 4   Standing Up From Chair Using Arms 4   Walk in Room 3   Climb 3-5 Stairs With Railing 3   Basic Mobility Inpatient Raw Score 22   Basic Mobility Standardized Score 47.4   [facility] Highest Level Of Mobility   -HLM Goal 7: Walk 25 feet or more   -HLM Achieved 7: Walk 25 feet or more   End of Consult   Patient Position at End of Consult Seated edge of bed;All needs within reach   End of Consult Comments pt stable, left seated EOB with  in room. RN updated       Hx/personal factors: co-morbidities, mutliple lines, pain, WB restrictions, and fall  risk  Examination: dec mobility, dec balance, dec endurance, dec amb, risk for falls, pain, assessed body system, balance, endurance, amb, D/C disposition & fall risk, WB restrictions, impairements in locomotion, musculoskeletal, balance, endurance, posture, coordination  Clinical: unpredictable (ongoing medical status, risk for falls, POD #0, and pain mgt)  Complexity: high     Juli To, PT

## 2024-10-17 NOTE — ANESTHESIA PROCEDURE NOTES
Peripheral Block    Patient location during procedure: holding area  Start time: 10/17/2024 10:30 AM  Reason for block: at surgeon's request and post-op pain management  Staffing  Performed by: Ruiz Mccord MD  Authorized by: Ruiz Mccord MD    Preanesthetic Checklist  Completed: patient identified, IV checked, site marked, risks and benefits discussed, surgical consent, monitors and equipment checked, pre-op evaluation and timeout performed  Peripheral Block  Patient position: supine  Prep: ChloraPrep  Patient monitoring: frequent blood pressure checks, continuous pulse oximetry and heart rate  Block type: Adductor Canal  Laterality: left  Injection technique: single-shot  Procedures: ultrasound guided, Ultrasound guidance required for the procedure to increase accuracy and safety of medication placement and decrease risk of complications.  Ultrasound permanent image saved  bupivacaine (PF) (MARCAINE) 0.5 % injection 20 mL - Perineural   10 mL - 10/17/2024 10:30:00 AM  bupivacaine liposomal (EXPAREL) 1.3 % injection 20 mL - Perineural   20 mL - 10/17/2024 10:30:00 AM  midazolam (VERSED) injection 0.5 mg - Intravenous   2 mg - 10/17/2024 10:25:00 AM  Needle  Needle type: Stimuplex   Needle gauge: 20 G  Needle length: 4 in  Needle localization: anatomical landmarks and ultrasound guidance  Assessment  Injection assessment: incremental injection, frequent aspiration, injected with ease, negative aspiration, negative for heart rate change, no paresthesia on injection, no symptoms of intraneural/intravenous injection and needle tip visualized at all times  Paresthesia pain: none  Post-procedure:  site cleaned  patient tolerated the procedure well with no immediate complications

## 2024-10-17 NOTE — ANESTHESIA POSTPROCEDURE EVALUATION
Post-Op Assessment Note    CV Status:  Stable  Pain Score: 0    Pain management: adequate       Mental Status:  Alert and awake   Hydration Status:  Euvolemic   PONV Controlled:  Controlled   Airway Patency:  Patent     Post Op Vitals Reviewed: Yes    No anethesia notable event occurred.    Staff: Anesthesiologist, CRNA           Last Filed PACU Vitals:  Vitals Value Taken Time   Temp 97.5    Pulse 106    /76 10/17/24 1303   Resp 16    SpO2 98    Vitals shown include unfiled device data.    Modified Gina:  No data recorded

## 2024-10-17 NOTE — PLAN OF CARE
Problem: PHYSICAL THERAPY ADULT  Goal: Performs mobility at highest level of function for planned discharge setting.  See evaluation for individualized goals.  Description: Treatment/Interventions: Functional transfer training, LE strengthening/ROM, Elevations, Therapeutic exercise, Endurance training, Patient/family training, Bed mobility, Gait training, Spoke to nursing, OT, Family  Equipment Recommended: Walker (pt owns)       See flowsheet documentation for full assessment, interventions and recommendations.  Note: Prognosis: Good  Problem List: Decreased strength, Decreased range of motion, Decreased endurance, Impaired balance, Decreased mobility, Orthopedic restrictions, Pain  Assessment: Pt is a 39 y.o. female who presented to [facility] on 10/17/2024 s/p L  Unicompartmental Arthroplasty done by Dr. Gray. Precautions include WBAT. Pt  has a past medical history of Anemia, Anesthesia, Anxiety, Blood type, Rh negative, COVID-19, Kidney stone, Knee pain, left, Knee pain, right, Migraine, Obesity, and PONV (postoperative nausea and vomiting).. Pt greeted at bedside for PT evaluation on 10/17/24. Pt referred to PT for functional mobility evaluation & D/C planning w/ orders of activity beginning POD #0 and activity as tolerated. PTA, pt reports being I w/o AD and I w/ IADLs. Personal factors affecting pt at time of IE include: lives in 2 story house and stairs to enter home. Please find objective findings from PT assessment regarding body systems outlined above with impairments and limitations including weakness, decreased ROM, impaired balance, decreased endurance, gait deviations, pain, decreased activity tolerance, decreased functional mobility tolerance, fall risk, and orthopedic restrictions.  Please see flow sheet above for objective findings and level of assistance required for safe completion of functional tasks. Pt able to perform supine to sit with S and use of bedrails. Pt performed sit<>stand with RW and  S. Pt then ambulated 80'x2 with RW and S. No buckling or LOB noted. Pt performed 4 steps with S and use of RW. Cues needed for proper sequencing, but good carryover. Vitals taken t/o session, RN aware.  Pt demonstrated dec endurance and tolerance to activity. Denies reports of dizziness or SOB t/o session. Patient was left  seated EOB  with call bell and all needs within reach. Pt was educated on fall precautions and reinforced w/ good understanding. Based on pt presentation and impaired function, pt would benefit from level III, (minimal resource intensity) at D/C. From PT/mobility standpoint, pt would benefit from OPPT to address deficits as defined above and maximize level of independence and return pt to PLOF. HEP given and reviewed with patient, no questions at this time. CM to follow. Nsg staff to continue to mobilized pt (OOB in chair for all meals & ambulate in room/unit) as tolerated to prevent further decline in function. Nsg notified. Co-eval performed with OT to complete this evaluation for the pts best interest given pts medical complexity and functional level.  Barriers to Discharge: None     Rehab Resource Intensity Level, PT: III (Minimum Resource Intensity)    See flowsheet documentation for full assessment.

## 2024-10-17 NOTE — ANESTHESIA POSTPROCEDURE EVALUATION
Post-Op Assessment Note    Last Filed PACU Vitals:  Vitals Value Taken Time   Temp 97.5 °F (36.4 °C) 10/17/24 1330   Pulse 83 10/17/24 1348   /84 10/17/24 1345   Resp 23 10/17/24 1348   SpO2 100 % 10/17/24 1348   Vitals shown include unfiled device data.    Modified Gina:  Activity: 1 (10/17/2024  1:45 PM)  Respiration: 2 (10/17/2024  1:45 PM)  Circulation: 2 (10/17/2024  1:45 PM)  Consciousness: 2 (10/17/2024  1:45 PM)  Oxygen Saturation: 2 (10/17/2024  1:45 PM)  Modified Gina Score: 9 (10/17/2024  1:45 PM)

## 2024-10-18 ENCOUNTER — TELEPHONE (OUTPATIENT)
Dept: OBGYN CLINIC | Facility: HOSPITAL | Age: 39
End: 2024-10-18

## 2024-10-21 ENCOUNTER — OFFICE VISIT (OUTPATIENT)
Dept: PHYSICAL THERAPY | Facility: CLINIC | Age: 39
End: 2024-10-21
Payer: COMMERCIAL

## 2024-10-21 DIAGNOSIS — M17.12 PRIMARY OSTEOARTHRITIS OF LEFT KNEE: Primary | ICD-10-CM

## 2024-10-21 PROCEDURE — 97110 THERAPEUTIC EXERCISES: CPT | Performed by: PHYSICAL THERAPIST

## 2024-10-21 PROCEDURE — 97140 MANUAL THERAPY 1/> REGIONS: CPT | Performed by: PHYSICAL THERAPIST

## 2024-10-21 NOTE — TELEPHONE ENCOUNTER
"Patient contacted for a postoperative follow up assessment. Patient reports doing \"good\" . Patient states current pain level of a  0/10  when sitting and 4/10 when walking with RW. Patient states they have minimal pain and it is relieved with medication regimen. Patient denies increase in swelling and dressing is clean, dry and intact. Patient is icing the site regularly.     We reviewed patients AVS medication list. Patient is taking Tylenol 1000mg every 8 hours, Oxycodone 5mg PRN, ASA 81mg BID, Colace 100mg BID. Patient has had a BM since being home.    Patient denies nausea, vomiting, abdominal pain, chest pain, shortness of breath, fever, dizziness and calf pain. Patient confirmed post-op appointment with PT toda, 10/21 and  surgeon on  11/01 .Patient does not have any other questions or concerns at this time. Pt was encouraged to call with any questions, concerns or issues.    "

## 2024-10-21 NOTE — PROGRESS NOTES
PT Evaluation     Today's date: 10/21/2024  Patient name: Kaitlynn Madera  : 1985  MRN: [id number]  Referring provider: Emi García PA-C  Dx:   Encounter Diagnosis     ICD-10-CM    1. Primary osteoarthritis of left knee  M17.12                      Assessment    Assessment details: Pt is a 39 y.o. female who presents to outpatient PT pre-op TKA with pain, decreased rom, decreased strength and decreased functional mobility. She will benefit from skilled PT to address these deficits in order to achieve her goals and maximize her functional mobility. All question regarding post-op care have answered. She has been provided with an initial hep to address current strength and rom deficits. She is scheduled for her first post-op appointment. Thank you for this referral..          Goals  Short Term Goals:   Independent performance of initial hep  Decrease pain 2 points on VAS      Long Term Goals:  Independent performance of comprehensive hep  Work performance is returned to max level of function  Performance of IADL's is returned to max level of function  Performance in recreational activities is improved to max level of function  Able to walk community distances with no AD  Able to climb stair with reciprocal gait pattern and single rail.      Plan    Frequency: 2x week  Duration in weeks: 8        Subjective Evaluation    History of Present Illness  Mechanism of injury: 10/17/24 underwent L unicompartmental TKA. Reports that her pain is well controlled with medication. Reports that she notices her knee feels like to will out when stepping down on stairs and with stand to sit transfers. Reports that she is able to sleep at night.      Reports that she would like to return to walking for recreation.    Patient Goals  Patient goals for therapy: decreased edema, decreased pain, increased motion, increased strength, independence with ADLs/IADLs, return to sport/leisure activities and return to  work    Pain  Current pain ratin  At worst pain ratin          Objective    L knee: post-tx    ROM   Flexion: 108   Extension: -11    Strength:   Flexion:  4/5   Extension:  4/5    mod edema noted  Good quad set and no extension lag noted   Neg dex's           Precautions: R unicompartmental TKA, OA, total joint bundle protocol in chart      Manuals 10/21            prom kl                                                   Neuro Re-Ed                                                                                                        Ther Ex             Quad set             saq             Slr flexion             Prone TKE             Gastroc stretch             Hr/tr             Heel slides             Cone taps             Ther Activity             bike                          Gait Training                                       Modalities             Cp with extension hang

## 2024-10-23 ENCOUNTER — OFFICE VISIT (OUTPATIENT)
Dept: PHYSICAL THERAPY | Facility: CLINIC | Age: 39
End: 2024-10-23
Payer: COMMERCIAL

## 2024-10-23 DIAGNOSIS — M17.12 PRIMARY OSTEOARTHRITIS OF LEFT KNEE: Primary | ICD-10-CM

## 2024-10-23 PROCEDURE — 97110 THERAPEUTIC EXERCISES: CPT | Performed by: PHYSICAL THERAPIST

## 2024-10-23 NOTE — PROGRESS NOTES
"Daily Note     Today's date: 10/23/2024  Patient name: Kaitlynn Madera  : 1985  MRN: [id number]  Referring provider: Emi García PA-C  Dx:   Encounter Diagnosis     ICD-10-CM    1. Primary osteoarthritis of left knee  M17.12           Start Time: 1017  Stop Time: 1102  Total time in clinic (min): 45 minutes    Subjective: Patient reports some pain along the incision line as well as the lower leg. The lower leg pain described as burning, resolved upon taking Tylenol and icing the area. Patient has been using a cold fluid device to reduce swelling and help fall asleep which has been effective.      Objective: See treatment diary below      Assessment: Tolerated treatment well not reporting any pain during the session. Patient demonstrated enough ROM to complete full revolutions on the recumbent bike without issue. Patient demonstrated decent quad activation with quad set.      Plan: Continue per plan of care.  Assess weightbearing ability on R leg and introduce pre-gait activities.    Treatment performed by  Tenzin Garcia  SPT, I attest that this treatment was directly supervised by Sonido GUT.         Precautions: R unicompartmental TKA, OA, total joint bundle protocol in chart      Manuals 10/21 10/23           prom kl                                                   Neuro Re-Ed                                                                                                        Ther Ex             Quad set  5\"x20           saq  x20           Slr flexion  x20           Prone TKE             Gastroc stretch  10\"x10           Hr/tr  2x10ea           Heel slides  5\"x15           Cone taps             Prone quad stretch  10\"x10           Standing hip abduction  x20           sls  nv           Tandem walking  nv           squats  nv                        Ther Activity             bike  8'                        Gait Training                                       Modalities             Cp with " extension hang

## 2024-10-30 ENCOUNTER — OFFICE VISIT (OUTPATIENT)
Dept: PHYSICAL THERAPY | Facility: CLINIC | Age: 39
End: 2024-10-30
Payer: COMMERCIAL

## 2024-10-30 DIAGNOSIS — M17.12 PRIMARY OSTEOARTHRITIS OF LEFT KNEE: Primary | ICD-10-CM

## 2024-10-30 PROCEDURE — 97140 MANUAL THERAPY 1/> REGIONS: CPT | Performed by: PHYSICAL THERAPIST

## 2024-10-30 PROCEDURE — 97110 THERAPEUTIC EXERCISES: CPT | Performed by: PHYSICAL THERAPIST

## 2024-10-30 NOTE — PROGRESS NOTES
"Daily Note     Today's date: 10/30/2024  Patient name: Kaitlynn Madera  : 1985  MRN: [id number]  Referring provider: Emi García PA-C  Dx:   Encounter Diagnosis     ICD-10-CM    1. Primary osteoarthritis of left knee  M17.12                      Subjective: Patient reports pain levels have been more manageable this week. Reports that she continues to have pain/weakness with step ups. Reports compliance with her hep.        Objective: See treatment diary below      Assessment: progressed therex. Requires VC's initially for equal Wb'ing during squat but this improves with reps.  Rom is improving steadily. Continues to progress all therex as fred Nv.        Plan: Continue per plan of care.  Assess weightbearing ability on R leg and introduce pre-gait activities.     Precautions: R unicompartmental TKA, OA, total joint bundle protocol in chart      Manuals 10/21 10/23 10/30          prom kl  kl                                                 Neuro Re-Ed                                                                                                        Ther Ex             Quad set  5\"x20           saq  x20           Slr flexion  x20           Prone TKE             Gastroc stretch  10\"x10 15\"x3          Hr/tr  2x10ea x20          Heel slides  5\"x15           Cone taps             Prone quad stretch  10\"x10 30\"x3          Standing hip abduction  x20           sls  nv 15\"x4                       squats  nv x20                       Ther Activity             bike  8' 8'                       Gait Training                                       Modalities             Cp with extension hang                               "

## 2024-11-01 ENCOUNTER — OFFICE VISIT (OUTPATIENT)
Dept: OBGYN CLINIC | Facility: CLINIC | Age: 39
End: 2024-11-01

## 2024-11-01 ENCOUNTER — APPOINTMENT (OUTPATIENT)
Dept: RADIOLOGY | Facility: CLINIC | Age: 39
End: 2024-11-01
Payer: COMMERCIAL

## 2024-11-01 VITALS
WEIGHT: 214 LBS | SYSTOLIC BLOOD PRESSURE: 126 MMHG | HEIGHT: 63 IN | HEART RATE: 80 BPM | BODY MASS INDEX: 37.92 KG/M2 | DIASTOLIC BLOOD PRESSURE: 80 MMHG

## 2024-11-01 DIAGNOSIS — Z47.1 AFTERCARE FOLLOWING LEFT KNEE JOINT REPLACEMENT SURGERY: Primary | ICD-10-CM

## 2024-11-01 DIAGNOSIS — Z47.1 AFTERCARE FOLLOWING LEFT KNEE JOINT REPLACEMENT SURGERY: ICD-10-CM

## 2024-11-01 DIAGNOSIS — Z96.652 AFTERCARE FOLLOWING LEFT KNEE JOINT REPLACEMENT SURGERY: ICD-10-CM

## 2024-11-01 DIAGNOSIS — Z96.652 AFTERCARE FOLLOWING LEFT KNEE JOINT REPLACEMENT SURGERY: Primary | ICD-10-CM

## 2024-11-01 PROCEDURE — 99024 POSTOP FOLLOW-UP VISIT: CPT | Performed by: STUDENT IN AN ORGANIZED HEALTH CARE EDUCATION/TRAINING PROGRAM

## 2024-11-01 PROCEDURE — 73562 X-RAY EXAM OF KNEE 3: CPT

## 2024-11-01 NOTE — PROGRESS NOTES
Subjective: 39 yr old male s/p left medial unicompartmental arthroplasty 10/17/24 overall doing very well. Post op dressing intact. Patient seen and examined Pain controlled with diclofenac oral and tylenol. Progressing well. Incision without drainage. Denies fevers or chills. Physical therapy is going well. Walking unassisted.     Physical Exam:  Incision: intact with no evidence of drainage or infection   ROM: 0-120  5/5 IP/Q/HS/TA/GS, 2+ DP/PT, SILT DP/SP/S/S/TN    XR left knee: cemented medial oxford, no fracture or dislocation. Components well-aligned and well-fixed.     Assessment/Plan:  39 yr old male s/p left medial unicompartmental arthroplasty 10/17/24 overall doing well    - continue multi-modal pain control   - Weight bearing status: WBAT  - DVT ppx: 81 mg ASA 4 weeks  - Incision care: shower let water run over incision and pat dry  - PT/OT  - F/U 4 weeks    Scribe Attestation      I,:  Teddy Nina am acting as a scribe while in the presence of the attending physician.:       I,:  León Gray DO personally performed the services described in this documentation    as scribed in my presence.:

## 2024-11-05 ENCOUNTER — OFFICE VISIT (OUTPATIENT)
Dept: PHYSICAL THERAPY | Facility: CLINIC | Age: 39
End: 2024-11-05
Payer: COMMERCIAL

## 2024-11-05 DIAGNOSIS — M17.12 PRIMARY OSTEOARTHRITIS OF LEFT KNEE: Primary | ICD-10-CM

## 2024-11-05 PROCEDURE — 97110 THERAPEUTIC EXERCISES: CPT | Performed by: PHYSICAL THERAPIST

## 2024-11-05 PROCEDURE — 97140 MANUAL THERAPY 1/> REGIONS: CPT | Performed by: PHYSICAL THERAPIST

## 2024-11-05 NOTE — PROGRESS NOTES
"Daily Note     Today's date: 2024  Patient name: Kaitlynn Madera  : 1985  MRN: [id number]  Referring provider: Emi García PA-C  Dx:   Encounter Diagnosis     ICD-10-CM    1. Primary osteoarthritis of left knee  M17.12                      Subjective: Patient reports that she is improving but continues to notices \"a sensation under the knee cap\" but does not reports pain when this sensation occurs.     Objective: See treatment diary below      Assessment:  rom is improving steadily. Progressed quad strengthening therex as listed with reports of fatigue but no sig pain.  Continues to progress strength training as fred nV.        Plan: Continue per plan of care.       Precautions: R unicompartmental TKA, OA,       Manuals 10/21 10/23 10/30 11/5         prom kl  kl kl                                                Neuro Re-Ed                                                                                                        Ther Ex             Quad set  5\"x20           saq  x20           Slr flexion  x20  x20         Prone TKE             Gastroc stretch  10\"x10 15\"x3 15\"x3         Hr/tr  2x10ea x20 Single leg x20         Heel slides  5\"x15           Cone taps             Prone quad stretch  10\"x10 30\"x3 30\"x3         Standing hip abduction  x20           sls  nv 15\"x4 On airex 15\"x4                      squats  nv x20          Lateral step down    6\"2x10         lunges    x20         Ther Activity             bike  8' 8' 8'                      Gait Training                                       Modalities             Cp with extension hang                               "

## 2024-11-07 ENCOUNTER — TELEPHONE (OUTPATIENT)
Age: 39
End: 2024-11-07

## 2024-11-07 NOTE — TELEPHONE ENCOUNTER
Her pictures look like a pretty normal post op knee, however I am in [place] and if she wants to come into the office so I can take a look at it, I would be happy to do that!

## 2024-11-07 NOTE — TELEPHONE ENCOUNTER
Spoke to patient, she reports red and swelling is local to the knee, not spreading. Fever started today, and took tylenol has not taken temperature since. She is going to take a picture and send via [employer].

## 2024-11-07 NOTE — TELEPHONE ENCOUNTER
Caller: Patient    Doctor: Isaac Alvarez    Reason for call: Patient had knee surgery on 10/17 and states she has been taking her medication and icing the knee. She also states the knee is hot to the touch, has been swelling and is more painful. She states her shin has also been sensitive, but no visible bruising. Patient is also running a light fever of 100.5 as of today. Please advise.      Call back#: [phone]

## 2024-11-08 ENCOUNTER — OFFICE VISIT (OUTPATIENT)
Dept: PHYSICAL THERAPY | Facility: CLINIC | Age: 39
End: 2024-11-08
Payer: COMMERCIAL

## 2024-11-08 ENCOUNTER — OFFICE VISIT (OUTPATIENT)
Dept: OBGYN CLINIC | Facility: CLINIC | Age: 39
End: 2024-11-08

## 2024-11-08 VITALS
BODY MASS INDEX: 37.92 KG/M2 | HEART RATE: 80 BPM | DIASTOLIC BLOOD PRESSURE: 90 MMHG | HEIGHT: 63 IN | SYSTOLIC BLOOD PRESSURE: 140 MMHG | WEIGHT: 214 LBS

## 2024-11-08 DIAGNOSIS — M17.12 PRIMARY OSTEOARTHRITIS OF LEFT KNEE: ICD-10-CM

## 2024-11-08 DIAGNOSIS — M17.12 PRIMARY OSTEOARTHRITIS OF LEFT KNEE: Primary | ICD-10-CM

## 2024-11-08 PROCEDURE — 97110 THERAPEUTIC EXERCISES: CPT | Performed by: PHYSICAL THERAPIST

## 2024-11-08 PROCEDURE — 99024 POSTOP FOLLOW-UP VISIT: CPT | Performed by: PHYSICIAN ASSISTANT

## 2024-11-08 PROCEDURE — 97140 MANUAL THERAPY 1/> REGIONS: CPT | Performed by: PHYSICAL THERAPIST

## 2024-11-08 RX ORDER — DICLOFENAC SODIUM 75 MG/1
75 TABLET, DELAYED RELEASE ORAL 2 TIMES DAILY
Qty: 60 TABLET | Refills: 1 | Status: SHIPPED | OUTPATIENT
Start: 2024-11-08

## 2024-11-08 NOTE — PROGRESS NOTES
"Daily Note     Today's date: 2024  Patient name: Kaitlynn Madera  : 1985  MRN: [id number]  Referring provider: Emi García PA-C  Dx:   Encounter Diagnosis     ICD-10-CM    1. Primary osteoarthritis of left knee  M17.12                      Subjective: Patient reports more clicking than pain in L knee. Patient denies any soreness from last session. Patient is seeing doctor later today to check out knee following a mild fever and increased redness at site.    Objective: See treatment diary below      Assessment:  Knee flexion is progressing adequately, still missing a few degrees of extension. Good passive patellar mobility, felt uncomfortable but tolerable for patient. Patient displayed good stability with SLS on foam, minimal sway observed.    Treatment was performed by REJI Méndez, directly supervised by Sonido Hui PT.    Plan: Continue per plan of care.       Precautions: R unicompartmental TKA, OA,       Manuals 10/21 10/23 10/30 11/5 11/8        prom kl  kl kl mr                                               Neuro Re-Ed                                                                                                        Ther Ex             Quad set  5\"x20           saq  x20           Slr flexion  x20  x20 x25        Prone TKE             Gastroc stretch  10\"x10 15\"x3 15\"x3 30\"x3        Hr/tr  2x10ea x20 Single leg x20 SL x20        Heel slides  5\"x15   5\"x10        Cone taps             Prone quad stretch  10\"x10 30\"x3 30\"x3         Standing hip abduction  x20   x15        sls  nv 15\"x4 On airex 15\"x4 On airex 20\"x4                     squats  nv x20  x20        Lateral step down    6\"2x10 6\" 2x15        lunges    x20 x20        Ther Activity             bike  8' 8' 8' 8'                     Gait Training                                       Modalities             Cp with extension hang     10'                          "

## 2024-11-08 NOTE — PROGRESS NOTES
Subjective: 39 yr old female s/p left medial unicompartmental arthroplasty 10/17/24 overall doing very well. She had an incidence where she had a low grade temp yesterday of 100.5.  She feels that she has more pain in her left knee than she did in her right knee post operatively and she is concerned.  Her incision is CDI and there has been no drainage.  Denies fevers and chills.  She was at PT today which went well. Pain controlled with diclofenac oral and tylenol. Progressing well.    Physical Exam:  Incision: CDI, no erythema or drainage noted.   No significant effusion noted  ROM: 0-120 without pain  5/5 IP/Q/HS/TA/GS, 2+ DP/PT, SILT DP/SP/S/S/TN      Assessment/Plan:  39 yr old male s/p left medial unicompartmental arthroplasty 10/17/24 overall doing well, no signs of infection.    - continue multi-modal pain control   - Weight bearing status: WBAT  - DVT ppx: 81 mg ASA 4 weeks  - Incision care: shower let water run over incision and pat dry  - PT/OT  - F/U as scheduled in 3 weeks.   - If pain worsens, loss of ROM, or systemic symptoms such as fever chills, she will let us know.        Emi García PA-C

## 2024-11-12 ENCOUNTER — OFFICE VISIT (OUTPATIENT)
Dept: PHYSICAL THERAPY | Facility: CLINIC | Age: 39
End: 2024-11-12
Payer: COMMERCIAL

## 2024-11-12 DIAGNOSIS — M17.12 PRIMARY OSTEOARTHRITIS OF LEFT KNEE: Primary | ICD-10-CM

## 2024-11-12 PROCEDURE — 97110 THERAPEUTIC EXERCISES: CPT

## 2024-11-12 NOTE — PROGRESS NOTES
"Daily Note     Today's date: 2024  Patient name: Kaitlynn Madera  : 1985  MRN: [id number]  Referring provider: Emi García PA-C  Dx:   Encounter Diagnosis     ICD-10-CM    1. Primary osteoarthritis of left knee  M17.12           Start Time: 1100  Stop Time: 1143  Total time in clinic (min): 43 minutes      Subjective: Patient reports her left knee is feeling good.      Objective: See treatment diary below.      Assessment: Progression of TE program is tolerated well.  Left knee ROM and strength improving.      Plan: Continue treatment as per PT plan of care.       Precautions: R unicompartmental TKA, OA       Manuals 10/21 10/23 10/30 11/5 11/12        prom kl  kl kl JLW                                               Neuro Re-Ed                                                                                                        Ther Ex             Quad set  5\"x20           saq  x20           Slr flexion  x20  x20 20        Slr abduction     20        Prone TKE             Gastroc stretch  10\"x10 15\"x3 15\"x3         Hr/tr  2x10ea x20 Single leg x20 single leg   3x10        Heel slides  5\"x15           Cone taps             Prone quad stretch  10\"x10 30\"x3 30\"x3  30\"x3        Standing hip abduction  x20           sls  nv 15\"x4 On airex 15\"x4  on airex   30\"x3                     squats  nv x20   leg press   single leg   30#   3x10        Lateral step downs    6\"2x10  6\"   3x10        lunges    x20  2x10        bike      8'                                  Ther Activity             bike  8' 8' 8' see above                     Gait Training                                       Modalities             CP with extension hang                                 "

## 2024-11-13 ENCOUNTER — TELEPHONE (OUTPATIENT)
Age: 39
End: 2024-11-13

## 2024-11-13 NOTE — TELEPHONE ENCOUNTER
Patient set up an appointment on my chart in December and not sure if she should be seen sooner. Can you please check appointment notes at your convenience? Thank you

## 2024-11-14 NOTE — TELEPHONE ENCOUNTER
"Attempted to reach patient to notify that patient can be considered an \"urgent new patient\" as her symptoms are more urgent than a month away appointment. Provided office phone number in voicemail per communication consent.  "

## 2024-11-15 ENCOUNTER — OFFICE VISIT (OUTPATIENT)
Dept: PHYSICAL THERAPY | Facility: CLINIC | Age: 39
End: 2024-11-15
Payer: COMMERCIAL

## 2024-11-15 DIAGNOSIS — M17.12 PRIMARY OSTEOARTHRITIS OF LEFT KNEE: Primary | ICD-10-CM

## 2024-11-15 PROCEDURE — 97110 THERAPEUTIC EXERCISES: CPT

## 2024-11-15 NOTE — PROGRESS NOTES
"Daily Note     Today's date: 11/15/2024  Patient name: Kaitlynn Madrea  : 1985  MRN: [id number]  Referring provider: Emi García PA-C  Dx:   Encounter Diagnosis     ICD-10-CM    1. Primary osteoarthritis of left knee  M17.12           Start Time: 1035  Stop Time: 1115  Total time in clinic (min): 40 minutes      Subjective: No significant changes to report.      Objective: See treatment diary below.      Assessment: Treatment is tolerated well.  Left knee ROM and strength improving.      Plan: Continue treatment as per PT plan of care.       Precautions: R unicompartmental TKA, OA       Manuals 10/21 10/23 10/30 11/5 11/12 11/15       prom kl  kl kl JLW JLW                                              Neuro Re-Ed                                                                                                        Ther Ex             Quad set  5\"x20           saq  x20           Slr flexion  x20  x20 20 2#  20       Slr abduction     20 2#  20       Prone TKE             Gastroc stretch  10\"x10 15\"x3 15\"x3         Hr/tr  2x10ea x20 Single leg x20 single leg   3x10 single leg   3x10       Heel slides  5\"x15           Cone taps             Prone quad stretch  10\"x10 30\"x3 30\"x3  30\"x3  30\"x4       Standing hip abduction  x20           sls  nv 15\"x4 On airex 15\"x4  on airex   30\"x3 on airex   30\"x3       Bosu step ups       20       squats  nv x20   leg press   single leg   30#   3x10 leg press   single leg   40#   3x10       Lateral step downs    6\"2x10  6\"   3x10  6\"   3x10       lunges    x20  2x10  2x10       bike        8                                 Ther Activity             bike   8 see above                     Gait Training                                       Modalities             CP with extension hang                                   "

## 2024-11-19 ENCOUNTER — OFFICE VISIT (OUTPATIENT)
Dept: PHYSICAL THERAPY | Facility: CLINIC | Age: 39
End: 2024-11-19
Payer: COMMERCIAL

## 2024-11-19 DIAGNOSIS — M17.12 PRIMARY OSTEOARTHRITIS OF LEFT KNEE: Primary | ICD-10-CM

## 2024-11-19 PROCEDURE — 97110 THERAPEUTIC EXERCISES: CPT

## 2024-11-19 NOTE — PROGRESS NOTES
"Daily Note     Today's date: 2024  Patient name: Kaitlynn Madera  : 1985  MRN: [id number]  Referring provider: Emi García PA-C  Dx:   Encounter Diagnosis     ICD-10-CM    1. Primary osteoarthritis of left knee  M17.12           Start Time: 1100  Stop Time: 1140  Total time in clinic (min): 40 minutes      Subjective: Patient reports her left knee is feeling good.      Objective: See treatment diary below.      Assessment: Treatment is tolerated well.  Left knee ROM and strength improving.      Plan: Continue treatment as per PT plan of care.       Precautions: R unicompartmental TKA, OA       Manuals 10/21 10/23 10/30 11/5 11/12 11/15 11/19      prom kl  kl kl JLW JLW JLW                                             Neuro Re-Ed                                                                                                        Ther Ex             Quad set  5\"x20           saq  x20           Slr flexion  x20  x20 20 2#  20 2.5#  30      Slr abduction     20 2#  20 2.5#  30      Prone TKE             Gastroc stretch  10\"x10 15\"x3 15\"x3         Hr/tr  2x10ea x20 Single leg x20 single leg   3x10 single leg   3x10       Heel slides  5\"x15           Cone taps             Prone quad stretch  10\"x10 30\"x3 30\"x3  30\"x3  30\"x4 30\"x4      Standing hip abduction  x20           sls  nv 15\"x4 On airex 15\"x4  on airex   30\"x3 on airex   30\"x3 on airex   30\"x3      Bosu step ups       20  fwd/lat   20 ea      squats  nv x20   leg press   single leg   30#   3x10 leg press   single leg   40#   3x10 leg press   single leg   40#   3x10      Lateral step downs    6\"2x10  6\"   3x10  6\"   3x10  6\"   3x10      lunges    x20  2x10  2x10  2x10      bike                                      Ther Activity             bike   see above                     Gait Training                                       Modalities             CP with extension hang                                     "

## 2024-11-22 ENCOUNTER — OFFICE VISIT (OUTPATIENT)
Dept: PHYSICAL THERAPY | Facility: CLINIC | Age: 39
End: 2024-11-22
Payer: COMMERCIAL

## 2024-11-22 DIAGNOSIS — M17.12 PRIMARY OSTEOARTHRITIS OF LEFT KNEE: Primary | ICD-10-CM

## 2024-11-22 PROCEDURE — 97110 THERAPEUTIC EXERCISES: CPT

## 2024-11-22 NOTE — PROGRESS NOTES
"Daily Note     Today's date: 2024  Patient name: Kaitlynn Madera  : 1985  MRN: [id number]  Referring provider: Emi García PA-C  Dx:   Encounter Diagnosis     ICD-10-CM    1. Primary osteoarthritis of left knee  M17.12           Start Time: 1031  Stop Time: 1113  Total time in clinic (min): 42 minutes      Subjective: Patient reports experiencing swelling in her left knee after prolonged standing.  Patient states, \"It was sore last night but it didn't wake me up.\"      Objective: See treatment diary below.      Assessment: Patient is doing well with TE program.  Left knee ROM and strength improving.      Plan: Continue treatment as per PT plan of care.       Precautions: R unicompartmental TKA, OA       Manuals 10/21 10/23 10/30 11/5 11/12 11/15 11/19 11/22     prom kl  kl kl JLW JLW JLW JLW                                            Neuro Re-Ed                                                                                                        Ther Ex             Quad set  5\"x20           saq  x20           Slr flexion  x20  x20 20 2#  20 2.5#  30 2.5#  30     Slr abduction     20 2#  20 2.5#  30 2.5#  30     Prone TKE             Gastroc stretch  10\"x10 15\"x3 15\"x3         Hr/tr  2x10ea x20 Single leg x20 single leg   3x10 single leg   3x10       Heel slides  5\"x15           Cone taps             Prone quad stretch  10\"x10 30\"x3 30\"x3  30\"x3  30\"x4 30\"x4  30\"x4     Standing hip abduction  x20           sls  nv 15\"x4 On airex 15\"x4  on airex   30\"x3 on airex   30\"x3 on airex   30\"x3 on airex   30\"x3     Bosu step ups       20  fwd/lat   20 ea fwd/lat   20 ea     squats  nv x20   leg press   single leg   30#   3x10 leg press   single leg   40#   3x10 leg press   single leg   40#   3x10 leg press   single leg   40#   3x10     Lateral step downs    6\"2x10  6\"   3x10  6\"   3x10  6\"   3x10  6\"   3x10     lunges    x20  2x10  2x10  2x10  2x10     sidestepping         blue   12ft x8     bike      8'  " 8' 8' 8'                               Ther Activity             bike  8' 8' 8' see above                     Gait Training                                       Modalities             CP with extension hang

## 2024-11-25 ENCOUNTER — PATIENT MESSAGE (OUTPATIENT)
Dept: OBGYN CLINIC | Facility: CLINIC | Age: 39
End: 2024-11-25

## 2024-11-25 DIAGNOSIS — Z96.652 AFTERCARE FOLLOWING LEFT KNEE JOINT REPLACEMENT SURGERY: Primary | ICD-10-CM

## 2024-11-25 DIAGNOSIS — Z47.1 AFTERCARE FOLLOWING LEFT KNEE JOINT REPLACEMENT SURGERY: Primary | ICD-10-CM

## 2024-11-25 RX ORDER — OXYCODONE HYDROCHLORIDE 5 MG/1
5 TABLET ORAL 2 TIMES DAILY
Qty: 30 TABLET | Refills: 0 | Status: SHIPPED | OUTPATIENT
Start: 2024-11-25 | End: 2024-12-05

## 2024-11-26 ENCOUNTER — APPOINTMENT (OUTPATIENT)
Dept: PHYSICAL THERAPY | Facility: CLINIC | Age: 39
End: 2024-11-26
Payer: COMMERCIAL

## 2024-11-29 ENCOUNTER — OFFICE VISIT (OUTPATIENT)
Dept: OBGYN CLINIC | Facility: CLINIC | Age: 39
End: 2024-11-29

## 2024-11-29 VITALS
BODY MASS INDEX: 37.92 KG/M2 | HEART RATE: 80 BPM | WEIGHT: 214 LBS | HEIGHT: 63 IN | SYSTOLIC BLOOD PRESSURE: 140 MMHG | DIASTOLIC BLOOD PRESSURE: 80 MMHG

## 2024-11-29 DIAGNOSIS — Z47.1 AFTERCARE FOLLOWING LEFT KNEE JOINT REPLACEMENT SURGERY: Primary | ICD-10-CM

## 2024-11-29 DIAGNOSIS — Z96.652 AFTERCARE FOLLOWING LEFT KNEE JOINT REPLACEMENT SURGERY: Primary | ICD-10-CM

## 2024-11-29 PROCEDURE — 99024 POSTOP FOLLOW-UP VISIT: CPT | Performed by: STUDENT IN AN ORGANIZED HEALTH CARE EDUCATION/TRAINING PROGRAM

## 2024-11-29 NOTE — PROGRESS NOTES
Subjective: 39 y.o. female presents to the office 6 weeks s/p left medial unicompartmental arthroplasty performed on 10/17/2024. She has been progressing very well in physical therapy.  She notes some burning pain occasionally. Pain well-controlled. Incision without drainage. Denies fevers or chills    Physical Exam:  Incision: wellhealed  ROM: 0-120 without pain  5/5 IP/Q/HS/TA/GS, 2+ DP/PT, SILT DP/SP/S/S/TN    No new imaging obtained today    Assessment/Plan:  6 weeks s/p left medial unicompartmental arthroplasty performed on 10/17/2024 doing excellent    - continue multi-modal pain control   - Weight bearing status: as tolerated  - DVT ppx: completed  - Incision care: no restrictions  - May transition to HEP  - Continue activities as tolerated  - F/U in 6 weeks    Scribe Attestation      I,:  Shannon Brennan am acting as a scribe while in the presence of the attending physician.:       I,:  León Gray DO personally performed the services described in this documentation    as scribed in my presence.:

## 2024-12-03 ENCOUNTER — OFFICE VISIT (OUTPATIENT)
Age: 39
End: 2024-12-03
Payer: COMMERCIAL

## 2024-12-03 VITALS — BODY MASS INDEX: 39.34 KG/M2 | HEIGHT: 63 IN | WEIGHT: 222 LBS

## 2024-12-03 DIAGNOSIS — Z97.5 BREAKTHROUGH BLEEDING WITH IUD: Primary | ICD-10-CM

## 2024-12-03 DIAGNOSIS — Z97.5 IUD (INTRAUTERINE DEVICE) IN PLACE: ICD-10-CM

## 2024-12-03 DIAGNOSIS — N92.1 BREAKTHROUGH BLEEDING WITH IUD: Primary | ICD-10-CM

## 2024-12-03 PROCEDURE — 99213 OFFICE O/P EST LOW 20 MIN: CPT | Performed by: OBSTETRICS & GYNECOLOGY

## 2024-12-03 NOTE — PROGRESS NOTES
GYN Problem Visit    HPI:  Mirena IUD inserted 4/27/21; has had irregular periods since September. Reports cramping with periods Using pads and filling every 4-6 hours. Has started ASA in October.  No stressors or illness.    PMH, PSH, Meds, Allergies, SocHx, FamHx reviewed and no changes noted.    Review of Systems   Constitutional: Negative for chills, fever, malaise/fatigue and night sweats.   Gastrointestinal:  Negative for bloating, abdominal pain, nausea and vomiting.   Genitourinary:  Negative for menorrhagia, missed menses, non-menstrual bleeding and pelvic pain.   Neurological:  Negative for dizziness, headaches, light-headedness and weakness.       Physical Exam  Constitutional:       Appearance: Normal appearance.   Genitourinary:      Vaginal bleeding present.      No vaginal discharge or ulceration.      No cervical friability or lesion.      IUD strings visualized.   HENT:      Head: Normocephalic.   Cardiovascular:      Rate and Rhythm: Normal rate and regular rhythm.   Pulmonary:      Effort: Pulmonary effort is normal.   Abdominal:      General: There is no distension.   Musculoskeletal:         General: No swelling.   Neurological:      General: No focal deficit present.      Mental Status: She is alert and oriented to person, place, and time.   Skin:     General: Skin is warm and dry.      Coloration: Skin is not pale.   Psychiatric:         Mood and Affect: Mood normal.         Behavior: Behavior normal.   Vitals and nursing note reviewed.      Impression/Plan:    1. Breakthrough bleeding with IUD (Primary)    - US pelvis complete w transvaginal; Future    2. IUD (intrauterine device) in place    Will schedule IUD exchange as long as sono is normal

## 2024-12-04 ENCOUNTER — OFFICE VISIT (OUTPATIENT)
Dept: PHYSICAL THERAPY | Facility: CLINIC | Age: 39
End: 2024-12-04
Payer: COMMERCIAL

## 2024-12-04 DIAGNOSIS — M17.12 PRIMARY OSTEOARTHRITIS OF LEFT KNEE: Primary | ICD-10-CM

## 2024-12-04 PROCEDURE — 97112 NEUROMUSCULAR REEDUCATION: CPT | Performed by: PHYSICAL THERAPIST

## 2024-12-04 PROCEDURE — 97140 MANUAL THERAPY 1/> REGIONS: CPT | Performed by: PHYSICAL THERAPIST

## 2024-12-04 NOTE — PROGRESS NOTES
"Daily Note     Today's date: 2024  Patient name: Kaitlynn Madera  : 1985  MRN: [id number]  Referring provider: Emi García PA-C  Dx:   Encounter Diagnosis     ICD-10-CM    1. Primary osteoarthritis of left knee  M17.12                        Subjective: Patient has been keeping up with HEP, no issues reported. Patient says knee is feeling good. Patient reports that she met with her surgeon who said that D/C from PT is up to physical therapist's discretion.     Objective: See treatment diary below.      Assessment: Patient tolerated resistance progressions in exercise well without an increase in pain, noted muscle fatigue afterwards. ROM is comparable to R side.    Treatment was performed by Tenzin Garcia, SPT, directly supervised by Sonido Hui PT.       Plan: Re-evaluate patient next session and assess potential to D/C to HEP.       Precautions: R unicompartmental TKA, OA       Manuals 10/21 10/23 10/30 11/5 11/12 11/15 11/19 11/22 12/4    prom kl  kl kl JLW JLW JLW JLW mr                                           Neuro Re-Ed                                                                                                        Ther Ex             Quad set  5\"x20           saq  x20           Slr flexion  x20  x20 20 2#  20 2.5#  30 2.5#  30 3# 20    Slr abduction     20 2#  20 2.5#  30 2.5#  30 3# 20    Prone TKE             Gastroc stretch  10\"x10 15\"x3 15\"x3         Hr/tr  2x10ea x20 Single leg x20 single leg   3x10 single leg   3x10       Heel slides  5\"x15           Cone taps             Prone quad stretch  10\"x10 30\"x3 30\"x3  30\"x3  30\"x4 30\"x4  30\"x4 30\"x4    Standing hip abduction  x20           sls  nv 15\"x4 On airex 15\"x4  on airex   30\"x3 on airex   30\"x3 on airex   30\"x3 on airex   30\"x3 on airex 30\"x3    Bosu step ups       20  fwd/lat   20 ea fwd/lat   20 ea Fwd/lat 20ea    squats  nv x20   leg press   single leg   30#   3x10 leg press   single leg   40#   3x10 leg press   single " "leg   40#   3x10 leg press   single leg   40#   3x10 Single leg 40# 10  50# 20    Lateral step downs    6\"2x10  6\"   3x10  6\"   3x10  6\"   3x10  6\"   3x10 6\" 3x10    lunges    x20  2x10  2x10  2x10  2x10 2x15    sidestepping         blue   12ft x8 Blue 15ftx8    bike      8'  8'  8'  8' 8'                              Ther Activity             bike  8' 8' 8' see above                     Gait Training                                       Modalities             CP with extension hang                                   "

## 2024-12-05 ENCOUNTER — HOSPITAL ENCOUNTER (OUTPATIENT)
Dept: ULTRASOUND IMAGING | Facility: HOSPITAL | Age: 39
Discharge: HOME | End: 2024-12-05
Attending: OBSTETRICS & GYNECOLOGY
Payer: COMMERCIAL

## 2024-12-05 DIAGNOSIS — N92.1 BREAKTHROUGH BLEEDING WITH IUD: ICD-10-CM

## 2024-12-05 DIAGNOSIS — Z97.5 BREAKTHROUGH BLEEDING WITH IUD: ICD-10-CM

## 2024-12-05 PROCEDURE — 76830 TRANSVAGINAL US NON-OB: CPT

## 2024-12-05 PROCEDURE — 76856 US EXAM PELVIC COMPLETE: CPT

## 2024-12-06 DIAGNOSIS — M17.11 PRIMARY OSTEOARTHRITIS OF RIGHT KNEE: ICD-10-CM

## 2024-12-06 DIAGNOSIS — G43.909 MIGRAINE WITHOUT STATUS MIGRAINOSUS, NOT INTRACTABLE, UNSPECIFIED MIGRAINE TYPE: ICD-10-CM

## 2024-12-06 RX ORDER — VENLAFAXINE HYDROCHLORIDE 150 MG/1
150 CAPSULE, EXTENDED RELEASE ORAL
Qty: 90 CAPSULE | Refills: 1 | Status: SHIPPED | OUTPATIENT
Start: 2024-12-06

## 2024-12-06 RX ORDER — ONDANSETRON 4 MG/1
4 TABLET, ORALLY DISINTEGRATING ORAL EVERY 6 HOURS PRN
Qty: 20 TABLET | Refills: 1 | Status: SHIPPED | OUTPATIENT
Start: 2024-12-06

## 2024-12-06 RX ORDER — ELETRIPTAN HYDROBROMIDE 40 MG/1
40 TABLET, FILM COATED ORAL ONCE AS NEEDED
Qty: 12 TABLET | Refills: 0 | Status: SHIPPED | OUTPATIENT
Start: 2024-12-06

## 2024-12-09 ENCOUNTER — PROCEDURE VISIT (OUTPATIENT)
Age: 39
End: 2024-12-09
Payer: COMMERCIAL

## 2024-12-09 VITALS
SYSTOLIC BLOOD PRESSURE: 142 MMHG | HEIGHT: 63 IN | BODY MASS INDEX: 39.44 KG/M2 | WEIGHT: 222.6 LBS | DIASTOLIC BLOOD PRESSURE: 82 MMHG

## 2024-12-09 DIAGNOSIS — T75.3XXA MOTION SICKNESS, INITIAL ENCOUNTER: Primary | ICD-10-CM

## 2024-12-09 DIAGNOSIS — Z30.433 ENCOUNTER FOR IUD REMOVAL AND REINSERTION: Primary | ICD-10-CM

## 2024-12-09 PROCEDURE — 58301 REMOVE INTRAUTERINE DEVICE: CPT | Performed by: OBSTETRICS & GYNECOLOGY

## 2024-12-09 PROCEDURE — 58300 INSERT INTRAUTERINE DEVICE: CPT | Performed by: OBSTETRICS & GYNECOLOGY

## 2024-12-09 RX ORDER — OXYCODONE HYDROCHLORIDE 5 MG/1
TABLET ORAL
COMMUNITY
Start: 2024-11-25

## 2024-12-09 RX ORDER — SCOLOPAMINE TRANSDERMAL SYSTEM 1 MG/1
1 PATCH, EXTENDED RELEASE TRANSDERMAL
Qty: 10 PATCH | Refills: 1 | Status: SHIPPED | OUTPATIENT
Start: 2024-12-09

## 2024-12-09 NOTE — PROGRESS NOTES
IUD Procedure    Date/Time: 12/9/2024 9:32 AM    Performed by: Jill Cartagena MD  Authorized by: Jill Cartagena MD    Other Assisting Provider: No    Verbal consent obtained?: Yes    Written consent obtained?: Yes    Risks and benefits: Risks, benefits and alternatives were discussed    Consent given by:  Patient  Time Out:     Time out: Immediately prior to the procedure a time out was called    Patient states understanding of procedure being performed: Yes    Required items: Required blood products, implants, devices and special equipment available    Patient identity confirmed:  Verbally with patient  Select procedure: IUD removal and insertion    IUD Insertion:     Pelvic exam performed: yes      Cervix cleaned and prepped: yes      Speculum placed in vagina: yes      Tenaculum applied to cervix: yes      IUD inserted with no complications: yes      Strings trimmed: yes      Uterus sounded: yes      Uterus sound depth (cm):  7.5    IUD type:  1 each Levonorgestrel 20 MCG/DAY  Post-procedure:     Patient tolerated procedure well: yes      Patient will follow up after next period: yes    IUD Removal:     Reason for removal comment:  Breakthrough bleeding    Removed without complications: yes      Strings visualized: yes      IUD intact: yes

## 2024-12-10 ENCOUNTER — OFFICE VISIT (OUTPATIENT)
Dept: PHYSICAL THERAPY | Facility: CLINIC | Age: 39
End: 2024-12-10
Payer: COMMERCIAL

## 2024-12-10 DIAGNOSIS — M17.12 PRIMARY OSTEOARTHRITIS OF LEFT KNEE: Primary | ICD-10-CM

## 2024-12-10 PROCEDURE — 97110 THERAPEUTIC EXERCISES: CPT | Performed by: PHYSICAL THERAPIST

## 2024-12-10 PROCEDURE — 97140 MANUAL THERAPY 1/> REGIONS: CPT | Performed by: PHYSICAL THERAPIST

## 2024-12-10 NOTE — PROGRESS NOTES
"Daily Note/DC summary     Today's date: 12/10/2024  Patient name: Kaitlynn Madera  : 1985  MRN: [id number]  Referring provider: Emi García PA-C  Dx:   Encounter Diagnosis     ICD-10-CM    1. Primary osteoarthritis of left knee  M17.12                        Subjective: Patient reports that she if pleased with her progress and feels she can continue independently. Reports that she plans to continues her workout at a local gym.      Objective: See treatment diary below.    L knee:     ROM   Flexion: 122   Extension: 0    Strength:   Flexion:  4+/5   Extension:  4+/5    min edema noted      Assessment: Pt has achieved a majority of her goals set at the start of therapy, is independent with her hep, and will be Dc'd at this time. She is instructed to call the clinic if she has question with her hep or if symptoms return.  Thank you for this referral.       Goals  Short Term Goals:    Independent performance of initial hep-met  Decrease pain 2 points on VAS-met      Long Term Goals:  Independent performance of comprehensive hep-met  Work performance is returned to max level of function-met  Performance of IADL's is returned to max level of function-met  Performance in recreational activities is improved to max level of function-met  Able to walk community distances with no AD-met  Able to climb stair with reciprocal gait pattern and single rail.  -met      Plan: D/C to HEP.       Precautions: R unicompartmental TKA, OA       Manuals 10/21 10/23 10/30 11/5 11/12 11/15 11/19 11/22 12/4 12/10   prom kl  kl kl JLW JLW JLW JLW mr kl                                          Neuro Re-Ed                                                                                                        Ther Ex             Quad set  5\"x20           saq  x20           Slr flexion  x20  x20 20 2#  20 2.5#  30 2.5#  30 3# 20 x20   Slr abduction     20 2#  20 2.5#  30 2.5#  30 3# 20 x20   Prone TKE             Gastroc stretch  " "10\"x10 15\"x3 15\"x3         Hr/tr  2x10ea x20 Single leg x20 single leg   3x10 single leg   3x10       Heel slides  5\"x15           Cone taps             Prone quad stretch  10\"x10 30\"x3 30\"x3  30\"x3  30\"x4 30\"x4  30\"x4 30\"x4 30\"x4   Standing hip abduction  x20           sls  nv 15\"x4 On airex 15\"x4  on airex   30\"x3 on airex   30\"x3 on airex   30\"x3 on airex   30\"x3 on airex 30\"x3    Bosu step ups       20  fwd/lat   20 ea fwd/lat   20 ea Fwd/lat 20ea    squats  nv x20   leg press   single leg   30#   3x10 leg press   single leg   40#   3x10 leg press   single leg   40#   3x10 leg press   single leg   40#   3x10 Single leg 40# 10  50# 20 50#3x10   Lateral step downs    6\"2x10  6\"   3x10  6\"   3x10  6\"   3x10  6\"   3x10 6\" 3x10 6\"3x10   lunges    x20  2x10  2x10  2x10  2x10 2x15    sidestepping         blue   12ft x8 Blue 15ftx8 Blue x8   bike      8' 8' 8' 8' 8' 8'                             Ther Activity             bike  8' 8' 8' see above                     Gait Training                                       Modalities             CP with extension hang                                   "

## 2024-12-11 ENCOUNTER — RESULTS FOLLOW-UP (OUTPATIENT)
Age: 39
End: 2024-12-11

## 2025-01-17 ENCOUNTER — OFFICE VISIT (OUTPATIENT)
Dept: OBGYN CLINIC | Facility: CLINIC | Age: 40
End: 2025-01-17
Payer: COMMERCIAL

## 2025-01-17 VITALS — BODY MASS INDEX: 39.34 KG/M2 | HEIGHT: 63 IN | WEIGHT: 222 LBS

## 2025-01-17 DIAGNOSIS — Z47.1 AFTERCARE FOLLOWING LEFT KNEE JOINT REPLACEMENT SURGERY: Primary | ICD-10-CM

## 2025-01-17 DIAGNOSIS — Z96.652 AFTERCARE FOLLOWING LEFT KNEE JOINT REPLACEMENT SURGERY: Primary | ICD-10-CM

## 2025-01-17 PROCEDURE — 99213 OFFICE O/P EST LOW 20 MIN: CPT | Performed by: STUDENT IN AN ORGANIZED HEALTH CARE EDUCATION/TRAINING PROGRAM

## 2025-01-17 NOTE — PROGRESS NOTES
Knee New Office Note    Assessment:     1. Aftercare following left knee joint replacement surgery        Plan:     Problem List Items Addressed This Visit    None  Visit Diagnoses         Aftercare following left knee joint replacement surgery    -  Primary           Findings today are consistent with 3 months s/p  left medial unicompartmental arthroplasty performed on 10/17/2024. Imaging and prognosis was reviewed with the patient today. Reviewed that she may start scar tissue massage for desensitization. She may continue activities as tolerated without restrictions. Follow up in 6-7 months for annual check of bilateral knees, new x-rays upon arrival.    Subjective:     Patient ID: Kaitlynn Madera is a 39 y.o. female.  Chief Complaint:  HPI:  39 y.o. female presents to the office 3 months s/p left medial unicompartmental arthroplasty performed on 10/17/2024. She has been doing well overall. She has noticed stiffness after prolonged sitting. She has returned to activities without limitations. She is happy with her progress thus far.    Allergy:  Allergies   Allergen Reactions    Bactrim [Sulfamethoxazole-Trimethoprim] Anaphylaxis and Edema     Annotation - [id number]: painful to swallow     Medications:  all current active meds have been reviewed  Past Medical History:  Past Medical History:   Diagnosis Date    Anemia     hx of anemia - per pt was told with trying to donate blood    Anesthesia     2/12/24 per pt - reports with epidural use - pt experienced big BP drop    Anxiety     Blood type, Rh negative     Resolved 2/18/2016     COVID-19     9/30/24 per pt symptoms start 9/16- dx with COVID 9/19 - did see MD for MC 9/24 and cleared ( MD aware of COVID)    Kidney stone     Knee pain, left     Knee pain, right     Migraine     Obesity     PONV (postoperative nausea and vomiting)      Past Surgical History:  Past Surgical History:   Procedure Laterality Date    EAR SURGERY      Last impression 8/5/2015      MYRINGOTOMY      MI ARTHRP KNEE CONDYLE&PLATEAU MEDIAL/LAT CMPRT Right 3/7/2024    Procedure: ARTHROPLASTY KNEE UNICOMPARTMENTAL;  Surgeon: León Gray DO;  Location: [facility];  Service: Orthopedics    MI ARTHRP KNEE CONDYLE&PLATEAU MEDIAL/LAT CMPRT Left 10/17/2024    Procedure: ARTHROPLASTY KNEE UNICOMPARTMENTAL;  Surgeon: León Gray DO;  Location: [facility];  Service: Orthopedics    TONSILLECTOMY      WISDOM TOOTH EXTRACTION       Family History:  Family History   Problem Relation Age of Onset    Anesthesia problems Mother     Goiter Mother     Breast cancer Mother     Mental illness Mother     Hypertension Mother     Thyroid disease Mother     Anxiety disorder Mother     Polycythemia Father     Hearing loss Father         wears hearing aids    Colon cancer Maternal Grandmother     Dementia Maternal Grandmother     Hypertension Maternal Grandmother     Diabetes Maternal Grandfather     Hypertension Maternal Grandfather     Colon cancer Maternal Grandfather     Heart attack Maternal Grandfather     Uterine cancer Paternal Grandmother     Hypertension Paternal Grandmother     Cancer Paternal Grandmother         uterine/ovarian cancer    Ovarian cancer Paternal Grandmother     Diabetes Paternal Grandfather     Dementia Paternal Grandfather     Glaucoma Paternal Grandfather     Heart attack Paternal Grandfather     Hyperthyroidism Maternal Aunt     Alcohol abuse Neg Hx     Substance Abuse Neg Hx      Social History:  Social History     Substance and Sexual Activity   Alcohol Use Not Currently    Alcohol/week: 2.0 standard drinks of alcohol    Types: 2 Glasses of wine per week    Comment: Socially-- avg use couple times a month     Social History     Substance and Sexual Activity   Drug Use No    Comment: Denies any drug use per pt     Social History     Tobacco Use   Smoking Status Never   Smokeless Tobacco Never   Tobacco Comments    Never a smoker or use of any tobacco products per pt         "  ROS:  General: Per HPI  Skin: Negative, except if noted below  HEENT: Negative  Respiratory: Negative  Cardiovascular: Negative  Gastrointestinal: Negative  Urinary: Negative  Vascular: Negative  Musculoskeletal: Positive per HPI   Neurologic: Positive per HPI  Endocrine: Negative    Objective:  BP Readings from Last 1 Encounters:   12/09/24 142/82      Wt Readings from Last 1 Encounters:   01/17/25 101 kg (222 lb)        Respiratory:   non-labored respirations    Lymphatics:  no palpable lymph nodes    Gait:   Steady    Neurologic:   Alert and oriented times 3  Patient with normal sensation except as noted below  Deep tendon reflexes 2+ except as noted in MSK exam    Bilateral Lower Extremity:  Left Knee:      Inspection:  well healed incision    Overall limb alignment neutral    Effusion: minimal    ROM 3-120 without pain    Extensor Lag: none    Palpation: no Joint line tenderness to palpation    AP Stability at 90 deg stable    M/L stability in full extension stable    M/L stability in midflexion stable    Motor: 5/5 Q/HS/TA/GS/P    Pulses: 2+ DP / 2+ PT    SILT DP/SP/S/S/TN    Imaging:  No new imaging obtained today    BMI:   Estimated body mass index is 39.33 kg/m² as calculated from the following:    Height as of this encounter: 5' 3\" (1.6 m).    Weight as of this encounter: 101 kg (222 lb).  BSA:   Estimated body surface area is 2.02 meters squared as calculated from the following:    Height as of this encounter: 5' 3\" (1.6 m).    Weight as of this encounter: 101 kg (222 lb).           Scribe Attestation      I,:  Shannon Brennan am acting as a scribe while in the presence of the attending physician.:       I,:  León Gray DO personally performed the services described in this documentation    as scribed in my presence.:              "

## 2025-01-20 ENCOUNTER — ANNUAL EXAM (OUTPATIENT)
Age: 40
End: 2025-01-20
Payer: COMMERCIAL

## 2025-01-20 VITALS
DIASTOLIC BLOOD PRESSURE: 88 MMHG | HEIGHT: 62 IN | WEIGHT: 224.4 LBS | BODY MASS INDEX: 41.3 KG/M2 | SYSTOLIC BLOOD PRESSURE: 144 MMHG

## 2025-01-20 DIAGNOSIS — Z12.4 SCREENING FOR CERVICAL CANCER: ICD-10-CM

## 2025-01-20 DIAGNOSIS — Z12.31 ENCOUNTER FOR SCREENING MAMMOGRAM FOR MALIGNANT NEOPLASM OF BREAST: ICD-10-CM

## 2025-01-20 DIAGNOSIS — Z01.419 ENCOUNTER FOR ANNUAL ROUTINE GYNECOLOGICAL EXAMINATION: Primary | ICD-10-CM

## 2025-01-20 DIAGNOSIS — N39.3 GSI (GENUINE STRESS INCONTINENCE), FEMALE: ICD-10-CM

## 2025-01-20 DIAGNOSIS — Z11.51 SCREENING FOR HUMAN PAPILLOMAVIRUS (HPV): ICD-10-CM

## 2025-01-20 DIAGNOSIS — Z97.5 IUD (INTRAUTERINE DEVICE) IN PLACE: ICD-10-CM

## 2025-01-20 PROCEDURE — G0145 SCR C/V CYTO,THINLAYER,RESCR: HCPCS | Performed by: OBSTETRICS & GYNECOLOGY

## 2025-01-20 PROCEDURE — S0612 ANNUAL GYNECOLOGICAL EXAMINA: HCPCS | Performed by: OBSTETRICS & GYNECOLOGY

## 2025-01-20 PROCEDURE — G0476 HPV COMBO ASSAY CA SCREEN: HCPCS | Performed by: OBSTETRICS & GYNECOLOGY

## 2025-01-20 NOTE — PROGRESS NOTES
Assessment/Plan:    1. Screening for cervical cancer    - Liquid-based pap, screening    2. Screening for human papillomavirus (HPV)    - Liquid-based pap, screening    3. Encounter for screening mammogram for malignant neoplasm of breast    - Mammo screening bilateral w 3d and cad; Future    4. Encounter for annual routine gynecological examination (Primary)      5. IUD (intrauterine device) in place      6. GSI (genuine stress incontinence), female    - Ambulatory Referral to Physical Therapy; Future            Subjective      Kaitlynn Madera is a 39 y.o. female who presents for annual exam.  She is doing very well with new IUD - no bleeding or cramping.  No breast concerns.  Notes some increase in urinary leakage.      Current contraception: IUD  History of abnormal Pap smear: no  Regular self breast exam: yes  History of abnormal mammogram: no  History of abnormal lipids: no    Menstrual History:    Menarche age: 9  No LMP recorded. Patient has had an implant.     Past Medical History:   Diagnosis Date    Anemia     hx of anemia - per pt was told with trying to donate blood    Anesthesia     24 per pt - reports with epidural use - pt experienced big BP drop    Anxiety     Blood type, Rh negative     Resolved 2016     COVID-19     24 per pt symptoms start - dx with COVID  - did see MD for MC  and cleared ( MD aware of COVID)    Kidney stone     Knee pain, left     Knee pain, right     Migraine     Obesity     PONV (postoperative nausea and vomiting)        Family History   Problem Relation Age of Onset    Anesthesia problems Mother     Goiter Mother     Breast cancer Mother 62    Mental illness Mother     Hypertension Mother     Thyroid disease Mother     Anxiety disorder Mother     Polycythemia Father     Hearing loss Father         wears hearing aids    Colon cancer Maternal Grandmother     Dementia Maternal Grandmother     Hypertension Maternal Grandmother     Diabetes  "Maternal Grandfather     Hypertension Maternal Grandfather     Colon cancer Maternal Grandfather     Heart attack Maternal Grandfather     Uterine cancer Paternal Grandmother     Hypertension Paternal Grandmother     Cancer Paternal Grandmother         uterine/ovarian cancer    Ovarian cancer Paternal Grandmother     Diabetes Paternal Grandfather     Dementia Paternal Grandfather     Glaucoma Paternal Grandfather     Heart attack Paternal Grandfather     Hyperthyroidism Maternal Aunt     Alcohol abuse Neg Hx     Substance Abuse Neg Hx        The following portions of the patient's history were reviewed and updated as appropriate: allergies, current medications, past family history, past medical history, past social history, past surgical history, and problem list.    Review of Systems  Pertinent items are noted in HPI.      Objective      /88 (BP Location: Right arm, Patient Position: Sitting, Cuff Size: Large)   Ht 5' 2.25\" (1.581 m)   Wt 102 kg (224 lb 6.4 oz)   BMI 40.71 kg/m²     General:   alert and oriented, in no acute distress   Heart:  Breasts: regular rate and rhythm  appear normal, no suspicious masses, no skin or nipple changes or axillary nodes.   Lungs: Effort normal   Abdomen: soft, non-tender, without masses or organomegaly   Vulva: normal   Vagina: normal mucosa   Cervix: no lesions and IUD strings seen   Uterus: normal size   Adnexa:  Bladder: normal adnexa  Non-tender, no prolapse               "

## 2025-01-21 LAB
HPV HR 12 DNA CVX QL NAA+PROBE: NEGATIVE
HPV16 DNA CVX QL NAA+PROBE: NEGATIVE
HPV18 DNA CVX QL NAA+PROBE: NEGATIVE

## 2025-01-23 DIAGNOSIS — M17.12 PRIMARY OSTEOARTHRITIS OF LEFT KNEE: ICD-10-CM

## 2025-01-23 DIAGNOSIS — M17.11 PRIMARY OSTEOARTHRITIS OF RIGHT KNEE: ICD-10-CM

## 2025-01-23 RX ORDER — DICLOFENAC SODIUM 75 MG/1
75 TABLET, DELAYED RELEASE ORAL 2 TIMES DAILY
Qty: 60 TABLET | Refills: 5 | Status: SHIPPED | OUTPATIENT
Start: 2025-01-23

## 2025-01-23 RX ORDER — ONDANSETRON 4 MG/1
4 TABLET, ORALLY DISINTEGRATING ORAL EVERY 6 HOURS PRN
Qty: 20 TABLET | Refills: 5 | Status: SHIPPED | OUTPATIENT
Start: 2025-01-23

## 2025-01-24 ENCOUNTER — RESULTS FOLLOW-UP (OUTPATIENT)
Age: 40
End: 2025-01-24

## 2025-01-24 LAB
LAB AP GYN PRIMARY INTERPRETATION: NORMAL
Lab: NORMAL

## 2025-02-04 DIAGNOSIS — G43.909 MIGRAINE WITHOUT STATUS MIGRAINOSUS, NOT INTRACTABLE, UNSPECIFIED MIGRAINE TYPE: ICD-10-CM

## 2025-02-04 DIAGNOSIS — E66.01 MORBID OBESITY WITH BMI OF 40.0-44.9, ADULT (HCC): ICD-10-CM

## 2025-02-05 RX ORDER — ELETRIPTAN HYDROBROMIDE 40 MG/1
40 TABLET, FILM COATED ORAL ONCE AS NEEDED
Qty: 12 TABLET | Refills: 0 | Status: SHIPPED | OUTPATIENT
Start: 2025-02-05

## 2025-02-06 ENCOUNTER — TELEPHONE (OUTPATIENT)
Age: 40
End: 2025-02-06

## 2025-02-06 NOTE — TELEPHONE ENCOUNTER
PA for Wegovy 1.7mg SUBMITTED to [employer]     via    []CMM-KEY:   [x]Surescripts-Case ID #: [id number]   []Availity-Auth ID #  []Faxed to plan   []Other website  []Phone call Case ID #     []PA sent as URGENT    All office notes, labs and other pertaining documents and studies sent. Clinical questions answered. Awaiting determination from insurance company.     Turnaround time for your insurance to make a decision on your Prior Authorization can take 7-21 business days.

## 2025-02-19 DIAGNOSIS — Z96.651 STATUS POST UNICOMPARTMENTAL KNEE REPLACEMENT, RIGHT: Primary | ICD-10-CM

## 2025-02-20 ENCOUNTER — APPOINTMENT (OUTPATIENT)
Dept: RADIOLOGY | Facility: CLINIC | Age: 40
End: 2025-02-20
Payer: COMMERCIAL

## 2025-02-20 DIAGNOSIS — Z96.651 STATUS POST UNICOMPARTMENTAL KNEE REPLACEMENT, RIGHT: ICD-10-CM

## 2025-02-20 PROCEDURE — 73562 X-RAY EXAM OF KNEE 3: CPT

## 2025-03-04 ENCOUNTER — OFFICE VISIT (OUTPATIENT)
Dept: URGENT CARE | Facility: CLINIC | Age: 40
End: 2025-03-04
Payer: COMMERCIAL

## 2025-03-04 ENCOUNTER — APPOINTMENT (OUTPATIENT)
Dept: RADIOLOGY | Facility: CLINIC | Age: 40
End: 2025-03-04
Payer: COMMERCIAL

## 2025-03-04 VITALS
TEMPERATURE: 98 F | WEIGHT: 220.6 LBS | RESPIRATION RATE: 18 BRPM | HEIGHT: 62 IN | BODY MASS INDEX: 40.59 KG/M2 | OXYGEN SATURATION: 98 % | SYSTOLIC BLOOD PRESSURE: 120 MMHG | HEART RATE: 85 BPM | DIASTOLIC BLOOD PRESSURE: 76 MMHG

## 2025-03-04 DIAGNOSIS — S80.11XA CONTUSION OF RIGHT LOWER EXTREMITY, INITIAL ENCOUNTER: Primary | ICD-10-CM

## 2025-03-04 DIAGNOSIS — M79.604 PAIN OF RIGHT LOWER EXTREMITY: ICD-10-CM

## 2025-03-04 DIAGNOSIS — T14.8XXA HEMATOMA: ICD-10-CM

## 2025-03-04 DIAGNOSIS — L03.115 CELLULITIS OF RIGHT LOWER EXTREMITY: ICD-10-CM

## 2025-03-04 PROCEDURE — 99213 OFFICE O/P EST LOW 20 MIN: CPT | Performed by: PHYSICIAN ASSISTANT

## 2025-03-04 PROCEDURE — 73590 X-RAY EXAM OF LOWER LEG: CPT

## 2025-03-04 RX ORDER — CEPHALEXIN 500 MG/1
500 CAPSULE ORAL EVERY 8 HOURS SCHEDULED
Qty: 30 CAPSULE | Refills: 0 | Status: SHIPPED | OUTPATIENT
Start: 2025-03-04 | End: 2025-03-13

## 2025-03-04 NOTE — PROGRESS NOTES
"[facility]        NAME: Kaitlynn Madera is a 40 y.o. female  : 1985    MRN: [id number]  DATE: 2025  TIME: 10:48 AM    /76 (BP Location: Left arm, Patient Position: Sitting, Cuff Size: Large)   Pulse 85   Temp 98 °F (36.7 °C) (Tympanic)   Resp 18   Ht 5' 2.25\" (1.581 m)   Wt 100 kg (220 lb 9.6 oz)   SpO2 98%   BMI 40.02 kg/m²     Assessment and Plan   Contusion of right lower extremity, initial encounter [S80.11XA]  1. Contusion of right lower extremity, initial encounter  XR tibia fibula 2 vw right    cephalexin (KEFLEX) 500 mg capsule      2. Hematoma  cephalexin (KEFLEX) 500 mg capsule      3. Cellulitis of right lower extremity  cephalexin (KEFLEX) 500 mg capsule            Patient Instructions       Follow up with PCP in 3-5 days.  Proceed to  ER if symptoms worsen.    Chief Complaint     Chief Complaint   Patient presents with    Leg Pain     Patient states three weeks ago she got kicked by a horse in her right leg, concerned for cellulitis, reports bruising, swelling, redness, warm to touch, firm, she's been elevating her leg and applying ice for relief          History of Present Illness       Pt with right lower leg pain since getting kicked by a horse a 2 weeks  ago         Review of Systems   Review of Systems   Constitutional: Negative.    HENT: Negative.     Eyes: Negative.    Respiratory: Negative.     Cardiovascular: Negative.    Gastrointestinal: Negative.    Endocrine: Negative.    Genitourinary: Negative.    Musculoskeletal: Negative.    Skin: Negative.    Allergic/Immunologic: Negative.    Neurological: Negative.    Hematological: Negative.    Psychiatric/Behavioral: Negative.     All other systems reviewed and are negative.        Current Medications       Current Outpatient Medications:     cephalexin (KEFLEX) 500 mg capsule, Take 1 capsule (500 mg total) by mouth every 8 (eight) hours for 10 days, Disp: 30 capsule, Rfl: 0    acetaminophen (TYLENOL) 500 mg " tablet, Take 2 tablets (1,000 mg total) by mouth every 8 (eight) hours, Disp: 60 tablet, Rfl: 0    ALPRAZolam (XANAX) 0.25 mg tablet, Take 1 tablet (0.25 mg total) by mouth 4 (four) times a day as needed for anxiety, Disp: 30 tablet, Rfl: 0    ascorbic acid (VITAMIN C) 500 MG tablet, Take 1 tablet (500 mg total) by mouth 2 (two) times a day, Disp: 60 tablet, Rfl: 1    Cholecalciferol (VITAMIN D3) 1,000 units tablet, Take 1 tablet (1,000 Units total) by mouth daily, Disp: 60 tablet, Rfl: 1    diclofenac (VOLTAREN) 75 mg EC tablet, Take 1 tablet (75 mg total) by mouth 2 (two) times a day, Disp: 60 tablet, Rfl: 1    diclofenac (VOLTAREN) 75 mg EC tablet, Take 1 tablet (75 mg total) by mouth 2 (two) times a day, Disp: 60 tablet, Rfl: 5    eletriptan (RELPAX) 40 MG tablet, Take 1 tablet (40 mg total) by mouth once as needed for migraine for up to 12 doses may repeat in 2 hours if necessary, Disp: 12 tablet, Rfl: 0    folic acid (FOLVITE) 1 mg tablet, Take 1 tablet (1 mg total) by mouth daily, Disp: 30 tablet, Rfl: 1    Levonorgestrel (MIRENA) 20 MCG/DAY IUD, 1 each by Intrauterine Device route Once every 8 years, Disp: , Rfl:     mometasone (ELOCON) 0.1 % cream, Apply topically daily (Patient not taking: Reported on 12/3/2024), Disp: 45 g, Rfl: 3    Multiple Vitamins-Minerals (multivitamin with minerals) tablet, Take 1 tablet by mouth daily, Disp: , Rfl:     ondansetron (ZOFRAN-ODT) 4 mg disintegrating tablet, Take 1 tablet (4 mg total) by mouth every 6 (six) hours as needed for nausea or vomiting (Patient not taking: Reported on 1/20/2025), Disp: 20 tablet, Rfl: 0    ondansetron (ZOFRAN-ODT) 4 mg disintegrating tablet, Take 1 tablet (4 mg total) by mouth every 6 (six) hours as needed for nausea or vomiting, Disp: 20 tablet, Rfl: 5    Semaglutide-Weight Management (WEGOVY) 1.7 MG/0.75ML, Inject 0.75 mL (1.7 mg total) under the skin once a week, Disp: 3 mL, Rfl: 0    venlafaxine (EFFEXOR-XR) 150 mg 24 hr capsule, Take 1  capsule (150 mg total) by mouth daily with breakfast, Disp: 90 capsule, Rfl: 1    Current Allergies     Allergies as of 03/04/2025 - Reviewed 03/04/2025   Allergen Reaction Noted    Bactrim [sulfamethoxazole-trimethoprim] Anaphylaxis and Edema 08/05/2015            The following portions of the patient's history were reviewed and updated as appropriate: allergies, current medications, past family history, past medical history, past social history, past surgical history and problem list.     Past Medical History:   Diagnosis Date    Anemia     hx of anemia - per pt was told with trying to donate blood    Anesthesia     2/12/24 per pt - reports with epidural use - pt experienced big BP drop    Anxiety     Blood type, Rh negative     Resolved 2/18/2016     COVID-19     9/30/24 per pt symptoms start 9/16- dx with COVID 9/19 - did see MD for [facility] 9/24 and cleared ( MD aware of COVID)    Kidney stone     Knee pain, left     Knee pain, right     Migraine     Obesity     PONV (postoperative nausea and vomiting)        Past Surgical History:   Procedure Laterality Date    EAR SURGERY      Last impression 8/5/2015     MYRINGOTOMY      [place] ARTHRP KNEE CONDYLE&PLATEAU MEDIAL/LAT CMPRT Right 3/7/2024    Procedure: ARTHROPLASTY KNEE UNICOMPARTMENTAL;  Surgeon: León Gray DO;  Location: [facility];  Service: Orthopedics    TN ARTHRP KNEE CONDYLE&PLATEAU MEDIAL/LAT CMPRT Left 10/17/2024    Procedure: ARTHROPLASTY KNEE UNICOMPARTMENTAL;  Surgeon: León Gray DO;  Location: [facility];  Service: Orthopedics    TONSILLECTOMY      WISDOM TOOTH EXTRACTION         Family History   Problem Relation Age of Onset    Anesthesia problems Mother     Goiter Mother     Breast cancer Mother 62    Mental illness Mother     Hypertension Mother     Thyroid disease Mother     Anxiety disorder Mother     Polycythemia Father     Hearing loss Father         wears hearing aids    Colon cancer Maternal Grandmother     Dementia Maternal  "Grandmother     Hypertension Maternal Grandmother     Diabetes Maternal Grandfather     Hypertension Maternal Grandfather     Colon cancer Maternal Grandfather     Heart attack Maternal Grandfather     Uterine cancer Paternal Grandmother     Hypertension Paternal Grandmother     Cancer Paternal Grandmother         uterine/ovarian cancer    Ovarian cancer Paternal Grandmother     Diabetes Paternal Grandfather     Dementia Paternal Grandfather     Glaucoma Paternal Grandfather     Heart attack Paternal Grandfather     Hyperthyroidism Maternal Aunt     Alcohol abuse Neg Hx     Substance Abuse Neg Hx          Medications have been verified.        Objective   /76 (BP Location: Left arm, Patient Position: Sitting, Cuff Size: Large)   Pulse 85   Temp 98 °F (36.7 °C) (Tympanic)   Resp 18   Ht 5' 2.25\" (1.581 m)   Wt 100 kg (220 lb 9.6 oz)   SpO2 98%   BMI 40.02 kg/m²        Physical Exam     Physical Exam  Vitals and nursing note reviewed.   Constitutional:       Appearance: Normal appearance. She is normal weight.   HENT:      Head: Normocephalic and atraumatic.   Cardiovascular:      Rate and Rhythm: Normal rate and regular rhythm.      Pulses: Normal pulses.      Heart sounds: Normal heart sounds.   Pulmonary:      Effort: Pulmonary effort is normal.      Breath sounds: Normal breath sounds.   Abdominal:      General: Bowel sounds are normal.      Palpations: Abdomen is soft.   Musculoskeletal:         General: Normal range of motion.      Cervical back: Normal range of motion and neck supple.      Comments: Right anterior midshaft tibia with 4cm swelling and erythema and tenderness  no calf pain  ankle ecchymosis  distal neuro and vascular wnl     No calf pain or swelling or erythema only anterior swelling    Skin:     General: Skin is warm.      Capillary Refill: Capillary refill takes less than 2 seconds.   Neurological:      Mental Status: She is alert and oriented to person, place, and time. "

## 2025-03-07 ENCOUNTER — RESULTS FOLLOW-UP (OUTPATIENT)
Dept: URGENT CARE | Facility: CLINIC | Age: 40
End: 2025-03-07

## 2025-03-10 ENCOUNTER — APPOINTMENT (INPATIENT)
Dept: NON INVASIVE DIAGNOSTICS | Facility: HOSPITAL | Age: 40
DRG: 603 | End: 2025-03-10
Payer: COMMERCIAL

## 2025-03-10 ENCOUNTER — APPOINTMENT (INPATIENT)
Dept: CT IMAGING | Facility: HOSPITAL | Age: 40
DRG: 603 | End: 2025-03-10
Payer: COMMERCIAL

## 2025-03-10 ENCOUNTER — HOSPITAL ENCOUNTER (INPATIENT)
Facility: HOSPITAL | Age: 40
LOS: 3 days | Discharge: HOME | DRG: 603 | End: 2025-03-13
Attending: EMERGENCY MEDICINE | Admitting: INTERNAL MEDICINE
Payer: COMMERCIAL

## 2025-03-10 ENCOUNTER — OFFICE VISIT (OUTPATIENT)
Age: 40
End: 2025-03-10
Payer: COMMERCIAL

## 2025-03-10 DIAGNOSIS — R03.0 ELEVATED BLOOD PRESSURE READING: ICD-10-CM

## 2025-03-10 DIAGNOSIS — L08.9 INFECTED HEMATOMA: Primary | ICD-10-CM

## 2025-03-10 DIAGNOSIS — T14.8XXA INFECTED HEMATOMA: Primary | ICD-10-CM

## 2025-03-10 DIAGNOSIS — Z96.651 AFTERCARE FOLLOWING RIGHT KNEE JOINT REPLACEMENT SURGERY: ICD-10-CM

## 2025-03-10 DIAGNOSIS — Z47.1 AFTERCARE FOLLOWING RIGHT KNEE JOINT REPLACEMENT SURGERY: ICD-10-CM

## 2025-03-10 PROBLEM — F41.9 ANXIETY: Status: ACTIVE | Noted: 2025-03-10

## 2025-03-10 LAB
ALBUMIN SERPL BCG-MCNC: 4.3 G/DL (ref 3.5–5)
ALP SERPL-CCNC: 53 U/L (ref 34–104)
ALT SERPL W P-5'-P-CCNC: 11 U/L (ref 7–52)
ANION GAP SERPL CALCULATED.3IONS-SCNC: 5 MMOL/L (ref 4–13)
APTT PPP: 27 SECONDS (ref 23–34)
AST SERPL W P-5'-P-CCNC: 11 U/L (ref 13–39)
BASOPHILS # BLD AUTO: 0.06 THOUSANDS/ÂΜL (ref 0–0.1)
BASOPHILS NFR BLD AUTO: 1 % (ref 0–1)
BILIRUB SERPL-MCNC: 0.29 MG/DL (ref 0.2–1)
BUN SERPL-MCNC: 16 MG/DL (ref 5–25)
CALCIUM SERPL-MCNC: 9.5 MG/DL (ref 8.4–10.2)
CHLORIDE SERPL-SCNC: 103 MMOL/L (ref 96–108)
CO2 SERPL-SCNC: 29 MMOL/L (ref 21–32)
CREAT SERPL-MCNC: 0.76 MG/DL (ref 0.6–1.3)
CRP SERPL QL: 32.6 MG/L
EOSINOPHIL # BLD AUTO: 0.2 THOUSAND/ÂΜL (ref 0–0.61)
EOSINOPHIL NFR BLD AUTO: 2 % (ref 0–6)
ERYTHROCYTE [DISTWIDTH] IN BLOOD BY AUTOMATED COUNT: 13.5 % (ref 11.6–15.1)
ERYTHROCYTE [SEDIMENTATION RATE] IN BLOOD: 28 MM/HOUR (ref 0–19)
EXT PREGNANCY TEST URINE: NEGATIVE
EXT. CONTROL: NORMAL
GFR SERPL CREATININE-BSD FRML MDRD: 97 ML/MIN/1.73SQ M
GLUCOSE SERPL-MCNC: 74 MG/DL (ref 65–140)
HCT VFR BLD AUTO: 43.2 % (ref 34.8–46.1)
HGB BLD-MCNC: 14.3 G/DL (ref 11.5–15.4)
IMM GRANULOCYTES # BLD AUTO: 0.05 THOUSAND/UL (ref 0–0.2)
IMM GRANULOCYTES NFR BLD AUTO: 1 % (ref 0–2)
INR PPP: 0.91 (ref 0.85–1.19)
LACTATE SERPL-SCNC: 0.7 MMOL/L (ref 0.5–2)
LYMPHOCYTES # BLD AUTO: 1.49 THOUSANDS/ÂΜL (ref 0.6–4.47)
LYMPHOCYTES NFR BLD AUTO: 18 % (ref 14–44)
MCH RBC QN AUTO: 28.5 PG (ref 26.8–34.3)
MCHC RBC AUTO-ENTMCNC: 33.1 G/DL (ref 31.4–37.4)
MCV RBC AUTO: 86 FL (ref 82–98)
MONOCYTES # BLD AUTO: 0.4 THOUSAND/ÂΜL (ref 0.17–1.22)
MONOCYTES NFR BLD AUTO: 5 % (ref 4–12)
NEUTROPHILS # BLD AUTO: 6.13 THOUSANDS/ÂΜL (ref 1.85–7.62)
NEUTS SEG NFR BLD AUTO: 73 % (ref 43–75)
NRBC BLD AUTO-RTO: 0 /100 WBCS
PLATELET # BLD AUTO: 353 THOUSANDS/UL (ref 149–390)
PMV BLD AUTO: 9.2 FL (ref 8.9–12.7)
POTASSIUM SERPL-SCNC: 4.1 MMOL/L (ref 3.5–5.3)
PROCALCITONIN SERPL-MCNC: <0.05 NG/ML
PROT SERPL-MCNC: 7.6 G/DL (ref 6.4–8.4)
PROTHROMBIN TIME: 12.7 SECONDS (ref 12.3–15)
RBC # BLD AUTO: 5.02 MILLION/UL (ref 3.81–5.12)
SODIUM SERPL-SCNC: 137 MMOL/L (ref 135–147)
WBC # BLD AUTO: 8.33 THOUSAND/UL (ref 4.31–10.16)

## 2025-03-10 PROCEDURE — 36415 COLL VENOUS BLD VENIPUNCTURE: CPT | Performed by: EMERGENCY MEDICINE

## 2025-03-10 PROCEDURE — 80053 COMPREHEN METABOLIC PANEL: CPT | Performed by: EMERGENCY MEDICINE

## 2025-03-10 PROCEDURE — 87040 BLOOD CULTURE FOR BACTERIA: CPT | Performed by: EMERGENCY MEDICINE

## 2025-03-10 PROCEDURE — 93971 EXTREMITY STUDY: CPT

## 2025-03-10 PROCEDURE — 83605 ASSAY OF LACTIC ACID: CPT | Performed by: EMERGENCY MEDICINE

## 2025-03-10 PROCEDURE — 96365 THER/PROPH/DIAG IV INF INIT: CPT

## 2025-03-10 PROCEDURE — 81025 URINE PREGNANCY TEST: CPT | Performed by: EMERGENCY MEDICINE

## 2025-03-10 PROCEDURE — 99223 1ST HOSP IP/OBS HIGH 75: CPT | Performed by: INTERNAL MEDICINE

## 2025-03-10 PROCEDURE — 85610 PROTHROMBIN TIME: CPT | Performed by: EMERGENCY MEDICINE

## 2025-03-10 PROCEDURE — 99285 EMERGENCY DEPT VISIT HI MDM: CPT | Performed by: EMERGENCY MEDICINE

## 2025-03-10 PROCEDURE — 86140 C-REACTIVE PROTEIN: CPT | Performed by: EMERGENCY MEDICINE

## 2025-03-10 PROCEDURE — 85652 RBC SED RATE AUTOMATED: CPT | Performed by: EMERGENCY MEDICINE

## 2025-03-10 PROCEDURE — 85730 THROMBOPLASTIN TIME PARTIAL: CPT | Performed by: EMERGENCY MEDICINE

## 2025-03-10 PROCEDURE — 93005 ELECTROCARDIOGRAM TRACING: CPT

## 2025-03-10 PROCEDURE — 99284 EMERGENCY DEPT VISIT MOD MDM: CPT

## 2025-03-10 PROCEDURE — 99214 OFFICE O/P EST MOD 30 MIN: CPT | Performed by: STUDENT IN AN ORGANIZED HEALTH CARE EDUCATION/TRAINING PROGRAM

## 2025-03-10 PROCEDURE — 73701 CT LOWER EXTREMITY W/DYE: CPT

## 2025-03-10 PROCEDURE — 84145 PROCALCITONIN (PCT): CPT | Performed by: EMERGENCY MEDICINE

## 2025-03-10 PROCEDURE — 85025 COMPLETE CBC W/AUTO DIFF WBC: CPT | Performed by: EMERGENCY MEDICINE

## 2025-03-10 RX ORDER — IBUPROFEN 400 MG/1
400 TABLET, FILM COATED ORAL EVERY 6 HOURS PRN
Status: DISCONTINUED | OUTPATIENT
Start: 2025-03-10 | End: 2025-03-13 | Stop reason: HOSPADM

## 2025-03-10 RX ORDER — CEFEPIME HYDROCHLORIDE 2 G/50ML
2000 INJECTION, SOLUTION INTRAVENOUS ONCE
Status: COMPLETED | OUTPATIENT
Start: 2025-03-10 | End: 2025-03-10

## 2025-03-10 RX ORDER — CEFEPIME HYDROCHLORIDE 2 G/50ML
2000 INJECTION, SOLUTION INTRAVENOUS EVERY 12 HOURS
Status: DISCONTINUED | OUTPATIENT
Start: 2025-03-11 | End: 2025-03-13 | Stop reason: HOSPADM

## 2025-03-10 RX ORDER — LABETALOL HYDROCHLORIDE 5 MG/ML
10 INJECTION, SOLUTION INTRAVENOUS EVERY 12 HOURS PRN
Status: DISCONTINUED | OUTPATIENT
Start: 2025-03-10 | End: 2025-03-12

## 2025-03-10 RX ORDER — VANCOMYCIN HYDROCHLORIDE 1 G/200ML
15 INJECTION, SOLUTION INTRAVENOUS EVERY 12 HOURS
Status: DISCONTINUED | OUTPATIENT
Start: 2025-03-11 | End: 2025-03-13

## 2025-03-10 RX ORDER — VENLAFAXINE HYDROCHLORIDE 150 MG/1
150 CAPSULE, EXTENDED RELEASE ORAL
Status: DISCONTINUED | OUTPATIENT
Start: 2025-03-11 | End: 2025-03-13 | Stop reason: HOSPADM

## 2025-03-10 RX ORDER — CEFEPIME HYDROCHLORIDE 2 G/1
2000 INJECTION, POWDER, FOR SOLUTION INTRAVENOUS EVERY 12 HOURS
Status: DISCONTINUED | OUTPATIENT
Start: 2025-03-11 | End: 2025-03-10

## 2025-03-10 RX ORDER — HEPARIN SODIUM 5000 [USP'U]/ML
5000 INJECTION, SOLUTION INTRAVENOUS; SUBCUTANEOUS EVERY 8 HOURS SCHEDULED
Status: DISCONTINUED | OUTPATIENT
Start: 2025-03-10 | End: 2025-03-13 | Stop reason: HOSPADM

## 2025-03-10 RX ORDER — ACETAMINOPHEN 325 MG/1
650 TABLET ORAL EVERY 6 HOURS PRN
Status: DISCONTINUED | OUTPATIENT
Start: 2025-03-10 | End: 2025-03-13 | Stop reason: HOSPADM

## 2025-03-10 RX ORDER — ALPRAZOLAM 0.25 MG
0.25 TABLET ORAL DAILY PRN
Status: DISCONTINUED | OUTPATIENT
Start: 2025-03-10 | End: 2025-03-13 | Stop reason: HOSPADM

## 2025-03-10 RX ADMIN — IOHEXOL 100 ML: 350 INJECTION, SOLUTION INTRAVENOUS at 13:50

## 2025-03-10 RX ADMIN — CEFEPIME HYDROCHLORIDE 2000 MG: 2 INJECTION, SOLUTION INTRAVENOUS at 23:23

## 2025-03-10 RX ADMIN — VANCOMYCIN HYDROCHLORIDE 1750 MG: 1 INJECTION, POWDER, LYOPHILIZED, FOR SOLUTION INTRAVENOUS at 13:14

## 2025-03-10 RX ADMIN — HEPARIN SODIUM 5000 UNITS: 5000 INJECTION, SOLUTION INTRAVENOUS; SUBCUTANEOUS at 20:23

## 2025-03-10 RX ADMIN — CEFEPIME HYDROCHLORIDE 2000 MG: 2 INJECTION, SOLUTION INTRAVENOUS at 12:48

## 2025-03-10 NOTE — H&P
H&P - Hospitalist   Name: Kaitlynn Madera 40 y.o. female I MRN: [id number]  Unit/Bed#: -01 I Date of Admission: 3/10/2025   Date of Service: 3/10/2025 I Hospital Day: 0     Assessment & Plan  Infection of wound hematoma  Patient was kicked by horse anterior right shin 2/11/2025 initially with bruising which then faded however she noticed new erythema and tenderness about 1 week ago.  Placed on Keflex by urgent care 3/4. No improvement, saw outpatient ortho 3/10 who recommended ED evaluation.  Not meeting SIRS criteria  CT right lower extremity: 6.6 cm collection in the pretibial soft tissues of the anteromedial mid calf, likely hematoma. Superinfection cannot be excluded by imaging. No soft tissue gas. Mild circumferential subcutaneous fat stranding and skin thickening extending from just below the knee through the ankle, which can be seen with cellulitis.  ESR 28  CRP: 32.6  ABX: Initiated on IV cefepime/vancomycin in the ED, continue for now, no wound for culture at this time   Orthopedic consult, appreciate recommendations   Elevated blood pressure reading  -190 on admission   Patient is not on anti-hypertensives at baseline   Suspect anxiety contributing > outpatient visits show   PRN labetalol   Hold off on standing anti-hypertensives   Anxiety  Continue home xanax PRN patient reports last taking about 2 weeks ago      VTE Pharmacologic Prophylaxis: VTE Score: 3 Moderate Risk (Score 3-4) - Pharmacological DVT Prophylaxis Ordered: heparin.  Code Status: Level 1 - Full Code   Discussion with family: Patient declined call to .     Anticipated Length of Stay: Patient will be admitted on an inpatient basis with an anticipated length of stay of greater than 2 midnights secondary to IV medications .    History of Present Illness   Chief Complaint: Right leg infection    Kaitlynn Madera is a 40 y.o. female with a PMH of anxiety, migraines, bilateral osteoarthritis of the knee status  post TKA who presents with worsening right lower extremity erythema, tenderness, edema and warmth over right anterior tibia.  Patient was kicked by her horse in the shin on 2/11/2025.  She initially had hematoma/bruising, she notes the bruising had began to fade however she noticed new erythema, tenderness concerning for infection about 1 week ago.  She was seen by urgent care and started on Keflex which she has taken as prescribed however due to persistent erythema, tenderness and warmth patient presented to outpatient orthopedic office today who recommended patient be seen in the ED.  On admission she is not meeting SIRS criteria.  CT lower extremity consistent with suspected infection of hematoma.  Patient denies any respiratory or GI symptoms.    Review of Systems   Constitutional:  Negative for chills and fever.   HENT:  Negative for congestion, rhinorrhea and sore throat.    Eyes:  Negative for visual disturbance.   Respiratory:  Negative for cough, chest tightness, shortness of breath and wheezing.    Cardiovascular:  Negative for chest pain and palpitations.   Gastrointestinal:  Negative for abdominal pain, constipation, diarrhea, nausea and vomiting.   Genitourinary:  Negative for difficulty urinating, dysuria, frequency and urgency.   Musculoskeletal:  Positive for arthralgias and myalgias. Negative for back pain.   Skin:  Positive for color change, rash and wound.   Neurological:  Negative for dizziness, light-headedness and headaches.   All other systems reviewed and are negative.      Historical Information   Past Medical History:   Diagnosis Date    Anemia     hx of anemia - per pt was told with trying to donate blood    Anesthesia     2/12/24 per pt - reports with epidural use - pt experienced big BP drop    Anxiety     Blood type, Rh negative     Resolved 2/18/2016     COVID-19     9/30/24 per pt symptoms start 9/16- dx with COVID 9/19 - did see MD for [facility] 9/24 and cleared ( MD aware of COVID)     Kidney stone     Knee pain, left     Knee pain, right     Migraine     Obesity     PONV (postoperative nausea and vomiting)      Past Surgical History:   Procedure Laterality Date    EAR SURGERY      Last impression 8/5/2015     MYRINGOTOMY      [place] ARTHRP KNEE CONDYLE&PLATEAU MEDIAL/LAT CMPRT Right 3/7/2024    Procedure: ARTHROPLASTY KNEE UNICOMPARTMENTAL;  Surgeon: León Gray DO;  Location:  MAIN OR;  Service: Orthopedics    [place] ARTHRP KNEE CONDYLE&PLATEAU MEDIAL/LAT CMPRT Left 10/17/2024    Procedure: ARTHROPLASTY KNEE UNICOMPARTMENTAL;  Surgeon: León Gray DO;  Location:  MAIN OR;  Service: Orthopedics    TONSILLECTOMY      WISDOM TOOTH EXTRACTION       Social History     Tobacco Use    Smoking status: Never    Smokeless tobacco: Never    Tobacco comments:     Never a smoker or use of any tobacco products per pt    Vaping Use    Vaping status: Never Used   Substance and Sexual Activity    Alcohol use: Not Currently     Alcohol/week: 2.0 standard drinks of alcohol     Types: 2 Glasses of wine per week     Comment: Socially-- avg use couple times a month    Drug use: No     Comment: Denies any drug use per pt    Sexual activity: Yes     Partners: Male     Birth control/protection: I.U.D.     E-Cigarette/Vaping    E-Cigarette Use Never User     Comments Denies any use per pt      E-Cigarette/Vaping Substances    Nicotine No     THC No     CBD No     Flavoring No     Other No     Unknown No      Family history non-contributory  Social History:  Marital Status: /Civil Union   Occupation: not assessed   Patient Pre-hospital Living Situation: Home, With spouse  Patient Pre-hospital Level of Mobility: walks  Patient Pre-hospital Diet Restrictions: none    Meds/Allergies   I have reviewed home medications with patient personally.  Prior to Admission medications    Medication Sig Start Date End Date Taking? Authorizing Provider   acetaminophen (TYLENOL) 500 mg tablet Take 2 tablets (1,000 mg  total) by mouth every 8 (eight) hours 10/17/24  Yes Emi García PA-C   ALPRAZolam (XANAX) 0.25 mg tablet Take 1 tablet (0.25 mg total) by mouth 4 (four) times a day as needed for anxiety 9/24/18  Yes Shannon Murrieta PA-C   cephalexin (KEFLEX) 500 mg capsule Take 1 capsule (500 mg total) by mouth every 8 (eight) hours for 10 days 3/4/25 3/14/25 Yes Rubio Aparicio Jr., PA-C   diclofenac (VOLTAREN) 75 mg EC tablet Take 1 tablet (75 mg total) by mouth 2 (two) times a day 1/23/25  Yes Shannon Murrieta PA-C   eletriptan (RELPAX) 40 MG tablet Take 1 tablet (40 mg total) by mouth once as needed for migraine for up to 12 doses may repeat in 2 hours if necessary 2/5/25  Yes Shannon Murrieta PA-C   Multiple Vitamins-Minerals (multivitamin with minerals) tablet Take 1 tablet by mouth daily 9/24/24  Yes Shannon Murrieta PA-C   ondansetron (ZOFRAN-ODT) 4 mg disintegrating tablet Take 1 tablet (4 mg total) by mouth every 6 (six) hours as needed for nausea or vomiting 1/23/25  Yes Shannon Murrieta PA-C   Semaglutide-Weight Management (WEGOVY) 1.7 MG/0.75ML Inject 0.75 mL (1.7 mg total) under the skin once a week 2/5/25  Yes Shannon Murrieta PA-C   venlafaxine (EFFEXOR-XR) 150 mg 24 hr capsule Take 1 capsule (150 mg total) by mouth daily with breakfast 12/6/24  Yes Shannon Murrieta PA-C   ascorbic acid (VITAMIN C) 500 MG tablet Take 1 tablet (500 mg total) by mouth 2 (two) times a day  Patient not taking: Reported on 3/10/2025 5/31/24   Emi García PA-C   Cholecalciferol (VITAMIN D3) 1,000 units tablet Take 1 tablet (1,000 Units total) by mouth daily  Patient not taking: Reported on 3/10/2025 5/31/24   Emi García PA-C   diclofenac (VOLTAREN) 75 mg EC tablet Take 1 tablet (75 mg total) by mouth 2 (two) times a day 11/8/24   Emi García PA-C   folic acid (FOLVITE) 1 mg tablet Take 1 tablet (1 mg total) by mouth daily  Patient not taking: Reported on 3/10/2025 5/31/24   Emi Pabon  TAMMY García   Levonorgestrel (MIRENA) 20 MCG/DAY IUD 1 each by Intrauterine Device route Once every 8 years 12/9/24   Historical Provider, MD   mometasone (ELOCON) 0.1 % cream Apply topically daily  Patient not taking: Reported on 12/3/2024 4/15/24   Shannon Murrieta PA-C   ondansetron (ZOFRAN-ODT) 4 mg disintegrating tablet Take 1 tablet (4 mg total) by mouth every 6 (six) hours as needed for nausea or vomiting  Patient not taking: Reported on 1/20/2025 10/17/24   Emi García PA-C     Allergies   Allergen Reactions    Bactrim [Sulfamethoxazole-Trimethoprim] Anaphylaxis and Edema     Annotation - [id number]: painful to swallow       Objective :  Temp:  [96.6 °F (35.9 °C)-98.8 °F (37.1 °C)] 96.6 °F (35.9 °C)  HR:  [] 93  BP: (168-199)/(103-121) 168/112  Resp:  [13-20] 16  SpO2:  [95 %-98 %] 96 %  O2 Device: None (Room air)    Physical Exam  Vitals and nursing note reviewed.   Constitutional:       General: She is not in acute distress.     Appearance: She is well-developed.   HENT:      Head: Normocephalic and atraumatic.   Eyes:      General:         Right eye: No discharge.         Left eye: No discharge.      Extraocular Movements: Extraocular movements intact.      Conjunctiva/sclera: Conjunctivae normal.   Cardiovascular:      Rate and Rhythm: Normal rate and regular rhythm.      Heart sounds: No murmur heard.  Pulmonary:      Effort: Pulmonary effort is normal. No respiratory distress.      Breath sounds: Normal breath sounds. No wheezing, rhonchi or rales.   Abdominal:      General: Bowel sounds are normal. There is no distension.      Palpations: Abdomen is soft.      Tenderness: There is no abdominal tenderness.   Musculoskeletal:         General: Tenderness present.      Cervical back: Neck supple.      Right lower leg: No edema.      Left lower leg: No edema.   Skin:     General: Skin is warm and dry.      Capillary Refill: Capillary refill takes less than 2 seconds.      Findings: Erythema  present.      Comments: Right anterior tibia with area of swelling and surrounding erythema, tender, warm to touch.   Neurological:      General: No focal deficit present.      Mental Status: She is alert and oriented to person, place, and time. Mental status is at baseline.      Cranial Nerves: No cranial nerve deficit.   Psychiatric:         Mood and Affect: Mood normal.         Behavior: Behavior normal.          Lines/Drains:            Lab Results: I have reviewed the following results:  Results from last 7 days   Lab Units 03/10/25  1244   WBC Thousand/uL 8.33   HEMOGLOBIN g/dL 14.3   HEMATOCRIT % 43.2   PLATELETS Thousands/uL 353   SEGS PCT % 73   LYMPHO PCT % 18   MONO PCT % 5   EOS PCT % 2     Results from last 7 days   Lab Units 03/10/25  1244   SODIUM mmol/L 137   POTASSIUM mmol/L 4.1   CHLORIDE mmol/L 103   CO2 mmol/L 29   BUN mg/dL 16   CREATININE mg/dL 0.76   ANION GAP mmol/L 5   CALCIUM mg/dL 9.5   ALBUMIN g/dL 4.3   TOTAL BILIRUBIN mg/dL 0.29   ALK PHOS U/L 53   ALT U/L 11   AST U/L 11*   GLUCOSE RANDOM mg/dL 74     Results from last 7 days   Lab Units 03/10/25  1244   INR  0.91         Lab Results   Component Value Date    HGBA1C 5.1 09/11/2024    HGBA1C 5.1 02/12/2024    HGBA1C 4.9 06/21/2023     Results from last 7 days   Lab Units 03/10/25  1244   LACTIC ACID mmol/L 0.7   PROCALCITONIN ng/ml <0.05       Imaging Results Review: I reviewed radiology reports from this admission including: CT right lower extremity.  Other Study Results Review: No additional pertinent studies reviewed.    Administrative Statements   I have spent a total time of 60 minutes in caring for this patient on the day of the visit/encounter including Diagnostic results, Documenting in the medical record, Reviewing/placing orders in the medical record (including tests, medications, and/or procedures), Obtaining or reviewing history  , and Communicating with other healthcare professionals .    ** Please Note: This note has been  constructed using a voice recognition system. **

## 2025-03-10 NOTE — PLAN OF CARE
Problem: PAIN - ADULT  Goal: Verbalizes/displays adequate comfort level or baseline comfort level  Description: Interventions:  - Encourage patient to monitor pain and request assistance  - Assess pain using appropriate pain scale  - Administer analgesics based on type and severity of pain and evaluate response  - Implement non-pharmacological measures as appropriate and evaluate response  - Consider cultural and social influences on pain and pain management  - Notify physician/advanced practitioner if interventions unsuccessful or patient reports new pain  Outcome: Progressing     Problem: INFECTION - ADULT  Goal: Absence or prevention of progression during hospitalization  Description: INTERVENTIONS:  - Assess and monitor for signs and symptoms of infection  - Monitor lab/diagnostic results  - Monitor all insertion sites, i.e. indwelling lines, tubes, and drains  - Monitor endotracheal if appropriate and nasal secretions for changes in amount and color  - Poyntelle appropriate cooling/warming therapies per order  - Administer medications as ordered  - Instruct and encourage patient and family to use good hand hygiene technique  - Identify and instruct in appropriate isolation precautions for identified infection/condition  Outcome: Progressing  Goal: Absence of fever/infection during neutropenic period  Description: INTERVENTIONS:  - Monitor WBC    Outcome: Progressing     Problem: SAFETY ADULT  Goal: Patient will remain free of falls  Description: INTERVENTIONS:  - Educate patient/family on patient safety including physical limitations  - Instruct patient to call for assistance with activity   - Consult OT/PT to assist with strengthening/mobility   - Keep Call bell within reach  - Keep bed low and locked with side rails adjusted as appropriate  - Keep care items and personal belongings within reach  - Initiate and maintain comfort rounds  - Make Fall Risk Sign visible to staff  - Offer Toileting every x Hours,  in advance of need  - Initiate/Maintain xalarm  - Obtain necessary fall risk management equipment: x  - Apply yellow socks and bracelet for high fall risk patients  - Consider moving patient to room near nurses station  Outcome: Progressing  Goal: Maintain or return to baseline ADL function  Description: INTERVENTIONS:  -  Assess patient's ability to carry out ADLs; assess patient's baseline for ADL function and identify physical deficits which impact ability to perform ADLs (bathing, care of mouth/teeth, toileting, grooming, dressing, etc.)  - Assess/evaluate cause of self-care deficits   - Assess range of motion  - Assess patient's mobility; develop plan if impaired  - Assess patient's need for assistive devices and provide as appropriate  - Encourage maximum independence but intervene and supervise when necessary  - Involve family in performance of ADLs  - Assess for home care needs following discharge   - Consider OT consult to assist with ADL evaluation and planning for discharge  - Provide patient education as appropriate  Outcome: Progressing  Goal: Maintains/Returns to pre admission functional level  Description: INTERVENTIONS:  - Perform AM-PAC 6 Click Basic Mobility/ Daily Activity assessment daily.  - Set and communicate daily mobility goal to care team and patient/family/caregiver.   - Collaborate with rehabilitation services on mobility goals if consulted  - Perform Range of Motion x times a day.  - Reposition patient every x hours.  - Dangle patient x times a day  - Stand patient x times a day  - Ambulate patient x times a day  - Out of bed to chair x times a day   - Out of bed for meals xxxx times a day  - Out of bed for toileting  - Record patient progress and toleration of activity level   Outcome: Progressing     Problem: DISCHARGE PLANNING  Goal: Discharge to home or other facility with appropriate resources  Description: INTERVENTIONS:  - Identify barriers to discharge w/patient and caregiver  -  Arrange for needed discharge resources and transportation as appropriate  - Identify discharge learning needs (meds, wound care, etc.)  - Arrange for interpretive services to assist at discharge as needed  - Refer to Case Management Department for coordinating discharge planning if the patient needs post-hospital services based on physician/advanced practitioner order or complex needs related to functional status, cognitive ability, or social support system  Outcome: Progressing     Problem: Knowledge Deficit  Goal: Patient/family/caregiver demonstrates understanding of disease process, treatment plan, medications, and discharge instructions  Description: Complete learning assessment and assess knowledge base.  Interventions:  - Provide teaching at level of understanding  - Provide teaching via preferred learning methods  Outcome: Progressing

## 2025-03-10 NOTE — ED PROVIDER NOTES
Time reflects when diagnosis was documented in both MDM as applicable and the Disposition within this note       Time User Action Codes Description Comment    3/10/2025  1:03 PM Lion Farooq Add [T14.8XXA,  L08.9] Infected hematoma           ED Disposition       ED Disposition   Admit    Condition   Stable    Date/Time   Mon Mar 10, 2025  1:03 PM    Comment   Case was discussed with ANTON and the patient's admission status was agreed to be Admission Status: inpatient status to the service of Dr. Griffin .               Assessment & Plan       Medical Decision Making  40-year-old female presenting with worsening infected hematoma.  Obtain sepsis evaluation, administer IV antibiotics and obtain CT scan per Ortho recommendations.  Discussed case with Anton will admit.    Amount and/or Complexity of Data Reviewed  Labs: ordered.  Radiology: ordered.    Risk  Prescription drug management.  Decision regarding hospitalization.             Medications   vancomycin (VANCOCIN) 1,750 mg in sodium chloride 0.9 % 500 mL IVPB (1,750 mg Intravenous New Bag 3/10/25 1314)   cefepime (MAXIPIME) IVPB (premix in dextrose) 2,000 mg 50 mL (0 mg Intravenous Stopped 3/10/25 1305)   iohexol (OMNIPAQUE) 350 MG/ML injection (MULTI-DOSE) 100 mL (100 mL Intravenous Given 3/10/25 1350)       ED Risk Strat Scores                            SBIRT 22yo+      Flowsheet Row Most Recent Value   Initial Alcohol Screen: US AUDIT-C     1. How often do you have a drink containing alcohol? 0 Filed at: 03/10/2025 1247   2. How many drinks containing alcohol do you have on a typical day you are drinking?  0 Filed at: 03/10/2025 1247   3a. Male UNDER 65: How often do you have five or more drinks on one occasion? 0 Filed at: 03/10/2025 1247   3b. FEMALE Any Age, or MALE 65+: How often do you have 4 or more drinks on one occassion? 0 Filed at: 03/10/2025 1247   Audit-C Score 0 Filed at: 03/10/2025 1247   MOISÉS: How many times in the past year have you...     Used an illegal drug or used a prescription medication for non-medical reasons? Never Filed at: 03/10/2025 9830                            History of Present Illness       Chief Complaint   Patient presents with    Leg Injury     Kicked by horse 1 month ago to RLE, started on abx 1 week ago without improvement.        Past Medical History:   Diagnosis Date    Anemia     hx of anemia - per pt was told with trying to donate blood    Anesthesia     2/12/24 per pt - reports with epidural use - pt experienced big BP drop    Anxiety     Blood type, Rh negative     Resolved 2/18/2016     COVID-19     9/30/24 per pt symptoms start 9/16- dx with COVID 9/19 - did see MD for MC 9/24 and cleared ( MD aware of COVID)    Kidney stone     Knee pain, left     Knee pain, right     Migraine     Obesity     PONV (postoperative nausea and vomiting)       Past Surgical History:   Procedure Laterality Date    EAR SURGERY      Last impression 8/5/2015     MYRINGOTOMY      AL ARTHRP KNEE CONDYLE&PLATEAU MEDIAL/LAT CMPRT Right 3/7/2024    Procedure: ARTHROPLASTY KNEE UNICOMPARTMENTAL;  Surgeon: León Gray DO;  Location: [facility];  Service: Orthopedics    AL ARTHRP KNEE CONDYLE&PLATEAU MEDIAL/LAT CMPRT Left 10/17/2024    Procedure: ARTHROPLASTY KNEE UNICOMPARTMENTAL;  Surgeon: León Gray DO;  Location: [facility];  Service: Orthopedics    TONSILLECTOMY      WISDOM TOOTH EXTRACTION        Family History   Problem Relation Age of Onset    Anesthesia problems Mother     Goiter Mother     Breast cancer Mother 62    Mental illness Mother     Hypertension Mother     Thyroid disease Mother     Anxiety disorder Mother     Polycythemia Father     Hearing loss Father         wears hearing aids    Colon cancer Maternal Grandmother     Dementia Maternal Grandmother     Hypertension Maternal Grandmother     Diabetes Maternal Grandfather     Hypertension Maternal Grandfather     Colon cancer Maternal Grandfather     Heart attack  Maternal Grandfather     Uterine cancer Paternal Grandmother     Hypertension Paternal Grandmother     Cancer Paternal Grandmother         uterine/ovarian cancer    Ovarian cancer Paternal Grandmother     Diabetes Paternal Grandfather     Dementia Paternal Grandfather     Glaucoma Paternal Grandfather     Heart attack Paternal Grandfather     Hyperthyroidism Maternal Aunt     Alcohol abuse Neg Hx     Substance Abuse Neg Hx       Social History     Tobacco Use    Smoking status: Never    Smokeless tobacco: Never    Tobacco comments:     Never a smoker or use of any tobacco products per pt    Vaping Use    Vaping status: Never Used   Substance Use Topics    Alcohol use: Not Currently     Alcohol/week: 2.0 standard drinks of alcohol     Types: 2 Glasses of wine per week     Comment: Socially-- avg use couple times a month    Drug use: No     Comment: Denies any drug use per pt      E-Cigarette/Vaping    E-Cigarette Use Never User     Comments Denies any use per pt       E-Cigarette/Vaping Substances    Nicotine No     THC No     CBD No     Flavoring No     Other No     Unknown No       I have reviewed and agree with the history as documented.     40-year-old female presents for evaluation of right lower extremity redness and pain, suspected infected hematoma.  Patient sent in from orthopedics office.  Got kicked by her horse 1 month ago.  Redness began shortly after and patient completed a course of Keflex.  Patient denies any other systemic signs of illness.  Intermittent pain.        Review of Systems   Constitutional:  Negative for fever.   Skin:  Positive for color change.           Objective       ED Triage Vitals [03/10/25 1236]   Temperature Pulse Blood Pressure Respirations SpO2 Patient Position - Orthostatic VS   98.8 °F (37.1 °C) 103 (!) 199/121 20 97 % Sitting      Temp Source Heart Rate Source BP Location FiO2 (%) Pain Score    Oral Monitor Right arm -- No Pain      Vitals      Date and Time Temp Pulse SpO2  Resp BP Pain Score FACES Pain Rating User   03/10/25 1407 96.6 °F (35.9 °C) -- -- -- 170/109 -- -- DII   03/10/25 1312 -- 90 98 % 13 180/103 -- -- JEC   03/10/25 1242 -- 97 97 % -- 199/121 -- -- [facility]   03/10/25 1236 98.8 °F (37.1 °C) 103 97 % 20 199/121 No Pain -- [facility]            Physical Exam  Vitals and nursing note reviewed.   Constitutional:       Appearance: She is well-developed.   HENT:      Head: Normocephalic and atraumatic.      Right Ear: External ear normal.      Left Ear: External ear normal.      Nose: Nose normal.   Eyes:      General: No scleral icterus.  Cardiovascular:      Rate and Rhythm: Normal rate.   Pulmonary:      Effort: Pulmonary effort is normal. No respiratory distress.   Abdominal:      General: There is no distension.   Musculoskeletal:         General: No deformity. Normal range of motion.      Cervical back: Normal range of motion.      Comments: Large hematoma with overlying erythema of the right anterior shin.  Picture below.  Intact distal pulses.  Normal range of motion   Skin:     Findings: Erythema present. No rash.   Neurological:      General: No focal deficit present.      Mental Status: She is alert and oriented to person, place, and time.   Psychiatric:         Mood and Affect: Mood normal.           Results Reviewed       Procedure Component Value Units Date/Time    Procalcitonin [[id number]]  (Normal) Collected: 03/10/25 1244    Lab Status: Final result Specimen: Blood from Arm, Right Updated: 03/10/25 1317     Procalcitonin <0.05 ng/ml     POCT pregnancy, urine [[id number]]  (Normal) Collected: 03/10/25 1315    Lab Status: Final result Updated: 03/10/25 1315     EXT Preg Test, Ur Negative     Control Valid    Protime-INR [[id number]]  (Normal) Collected: 03/10/25 1244    Lab Status: Final result Specimen: Blood from Arm, Right Updated: 03/10/25 1310     Protime 12.7 seconds      INR 0.91    Narrative:      INR Therapeutic Range    Indication                                              INR Range      Atrial Fibrillation                                               2.0-3.0  Hypercoagulable State                                    2.0.2.3  Left Ventricular Asist Device                            2.0-3.0  Mechanical Heart Valve                                  -    Aortic(with afib, MI, embolism, HF, LA enlargement,    and/or coagulopathy)                                     2.0-3.0 (2.5-3.5)     Mitral                                                             2.5-3.5  Prosthetic/Bioprosthetic Heart Valve               2.0-3.0  Venous thromboembolism (VTE: VT, PE        2.0-3.0    APTT [[id number]]  (Normal) Collected: 03/10/25 1244    Lab Status: Final result Specimen: Blood from Arm, Right Updated: 03/10/25 1310     PTT 27 seconds     Comprehensive metabolic panel [[id number]]  (Abnormal) Collected: 03/10/25 1244    Lab Status: Final result Specimen: Blood from Arm, Right Updated: 03/10/25 1308     Sodium 137 mmol/L      Potassium 4.1 mmol/L      Chloride 103 mmol/L      CO2 29 mmol/L      ANION GAP 5 mmol/L      BUN 16 mg/dL      Creatinine 0.76 mg/dL      Glucose 74 mg/dL      Calcium 9.5 mg/dL      AST 11 U/L      ALT 11 U/L      Alkaline Phosphatase 53 U/L      Total Protein 7.6 g/dL      Albumin 4.3 g/dL      Total Bilirubin 0.29 mg/dL      eGFR 97 ml/min/1.73sq m     Narrative:      National Kid[employer] guidelines for Chronic Kidney Disease (CKD):     Stage 1 with normal or high GFR (GFR > 90 mL/min/1.73 square meters)    Stage 2 Mild CKD (GFR = 60-89 mL/min/1.73 square meters)    Stage 3A Moderate CKD (GFR = 45-59 mL/min/1.73 square meters)    Stage 3B Moderate CKD (GFR = 30-44 mL/min/1.73 square meters)    Stage 4 Severe CKD (GFR = 15-29 mL/min/1.73 square meters)    Stage 5 End Stage CKD (GFR <15 mL/min/1.73 square meters)  Note: GFR calculation is accurate only with a steady state creatinine    C-reactive protein [[id number]]  (Abnormal) Collected: 03/10/25 1244     Lab Status: Final result Specimen: Blood from Arm, Right Updated: 03/10/25 1308     CRP 32.6 mg/L     Lactic acid [[id number]]  (Normal) Collected: 03/10/25 1244    Lab Status: Final result Specimen: Blood from Arm, Right Updated: 03/10/25 1307     LACTIC ACID 0.7 mmol/L     Narrative:      Result may be elevated if tourniquet was used during collection.    Sedimentation rate, automated [[id number]]  (Abnormal) Collected: 03/10/25 1244    Lab Status: Final result Specimen: Blood from Arm, Right Updated: 03/10/25 1303     Sed Rate 28 mm/hour     CBC and differential [[id number]] Collected: 03/10/25 1244    Lab Status: Final result Specimen: Blood from Arm, Right Updated: 03/10/25 1252     WBC 8.33 Thousand/uL      RBC 5.02 Million/uL      Hemoglobin 14.3 g/dL      Hematocrit 43.2 %      MCV 86 fL      MCH 28.5 pg      MCHC 33.1 g/dL      RDW 13.5 %      MPV 9.2 fL      Platelets 353 Thousands/uL      nRBC 0 /100 WBCs      Segmented % 73 %      Immature Grans % 1 %      Lymphocytes % 18 %      Monocytes % 5 %      Eosinophils Relative 2 %      Basophils Relative 1 %      Absolute Neutrophils 6.13 Thousands/µL      Absolute Immature Grans 0.05 Thousand/uL      Absolute Lymphocytes 1.49 Thousands/µL      Absolute Monocytes 0.40 Thousand/µL      Eosinophils Absolute 0.20 Thousand/µL      Basophils Absolute 0.06 Thousands/µL     Blood culture #1 [[id number]] Collected: 03/10/25 1244    Lab Status: In process Specimen: Blood from Arm, Right Updated: 03/10/25 1252    Blood culture #2 [[id number]] Collected: 03/10/25 1244    Lab Status: In process Specimen: Blood from Arm, Left Updated: 03/10/25 1250            CT lower extremity w contrast right    (Results Pending)       Procedures    ED Medication and Procedure Management   Prior to Admission Medications   Prescriptions Last Dose Informant Patient Reported? Taking?   ALPRAZolam (XANAX) 0.25 mg tablet  Self No No   Sig: Take 1 tablet (0.25 mg total) by mouth 4 (four) times a  day as needed for anxiety   Cholecalciferol (VITAMIN D3) 1,000 units tablet  Self No No   Sig: Take 1 tablet (1,000 Units total) by mouth daily   Levonorgestrel (MIRENA) 20 MCG/DAY IUD   Yes No   Si each by Intrauterine Device route Once every 8 years   Multiple Vitamins-Minerals (multivitamin with minerals) tablet  Self No No   Sig: Take 1 tablet by mouth daily   Semaglutide-Weight Management (WEGOVY) 1.7 MG/0.75ML   No No   Sig: Inject 0.75 mL (1.7 mg total) under the skin once a week   acetaminophen (TYLENOL) 500 mg tablet  Self No No   Sig: Take 2 tablets (1,000 mg total) by mouth every 8 (eight) hours   ascorbic acid (VITAMIN C) 500 MG tablet  Self No No   Sig: Take 1 tablet (500 mg total) by mouth 2 (two) times a day   cephalexin (KEFLEX) 500 mg capsule   No No   Sig: Take 1 capsule (500 mg total) by mouth every 8 (eight) hours for 10 days   diclofenac (VOLTAREN) 75 mg EC tablet  Self No No   Sig: Take 1 tablet (75 mg total) by mouth 2 (two) times a day   diclofenac (VOLTAREN) 75 mg EC tablet   No No   Sig: Take 1 tablet (75 mg total) by mouth 2 (two) times a day   eletriptan (RELPAX) 40 MG tablet   No No   Sig: Take 1 tablet (40 mg total) by mouth once as needed for migraine for up to 12 doses may repeat in 2 hours if necessary   folic acid (FOLVITE) 1 mg tablet  Self No No   Sig: Take 1 tablet (1 mg total) by mouth daily   mometasone (ELOCON) 0.1 % cream  Self No No   Sig: Apply topically daily   Patient not taking: Reported on 12/3/2024   ondansetron (ZOFRAN-ODT) 4 mg disintegrating tablet  Self No No   Sig: Take 1 tablet (4 mg total) by mouth every 6 (six) hours as needed for nausea or vomiting   Patient not taking: Reported on 2025   ondansetron (ZOFRAN-ODT) 4 mg disintegrating tablet   No No   Sig: Take 1 tablet (4 mg total) by mouth every 6 (six) hours as needed for nausea or vomiting   Patient not taking: Reported on 3/10/2025   venlafaxine (EFFEXOR-XR) 150 mg 24 hr capsule  Self No No   Sig:  Take 1 capsule (150 mg total) by mouth daily with breakfast      Facility-Administered Medications: None     Current Discharge Medication List        CONTINUE these medications which have NOT CHANGED    Details   acetaminophen (TYLENOL) 500 mg tablet Take 2 tablets (1,000 mg total) by mouth every 8 (eight) hours  Qty: 60 tablet, Refills: 0    Associated Diagnoses: Primary osteoarthritis of left knee      ALPRAZolam (XANAX) 0.25 mg tablet Take 1 tablet (0.25 mg total) by mouth 4 (four) times a day as needed for anxiety  Qty: 30 tablet, Refills: 0    Associated Diagnoses: Anxiety      ascorbic acid (VITAMIN C) 500 MG tablet Take 1 tablet (500 mg total) by mouth 2 (two) times a day  Qty: 60 tablet, Refills: 1    Associated Diagnoses: Primary osteoarthritis of left knee      cephalexin (KEFLEX) 500 mg capsule Take 1 capsule (500 mg total) by mouth every 8 (eight) hours for 10 days  Qty: 30 capsule, Refills: 0    Associated Diagnoses: Contusion of right lower extremity, initial encounter; Hematoma; Cellulitis of right lower extremity      Cholecalciferol (VITAMIN D3) 1,000 units tablet Take 1 tablet (1,000 Units total) by mouth daily  Qty: 60 tablet, Refills: 1    Associated Diagnoses: Primary osteoarthritis of left knee      !! diclofenac (VOLTAREN) 75 mg EC tablet Take 1 tablet (75 mg total) by mouth 2 (two) times a day  Qty: 60 tablet, Refills: 1    Associated Diagnoses: Primary osteoarthritis of left knee      !! diclofenac (VOLTAREN) 75 mg EC tablet Take 1 tablet (75 mg total) by mouth 2 (two) times a day  Qty: 60 tablet, Refills: 5    Associated Diagnoses: Primary osteoarthritis of right knee; Primary osteoarthritis of left knee      eletriptan (RELPAX) 40 MG tablet Take 1 tablet (40 mg total) by mouth once as needed for migraine for up to 12 doses may repeat in 2 hours if necessary  Qty: 12 tablet, Refills: 0    Associated Diagnoses: Migraine without status migrainosus, not intractable, unspecified migraine type       folic acid (FOLVITE) 1 mg tablet Take 1 tablet (1 mg total) by mouth daily  Qty: 30 tablet, Refills: 1    Associated Diagnoses: Primary osteoarthritis of left knee      Levonorgestrel (MIRENA) 20 MCG/DAY IUD 1 each by Intrauterine Device route Once every 8 years      mometasone (ELOCON) 0.1 % cream Apply topically daily  Qty: 45 g, Refills: 3    Associated Diagnoses: Allergic contact dermatitis, unspecified trigger      Multiple Vitamins-Minerals (multivitamin with minerals) tablet Take 1 tablet by mouth daily    Associated Diagnoses: Primary osteoarthritis of right knee      !! ondansetron (ZOFRAN-ODT) 4 mg disintegrating tablet Take 1 tablet (4 mg total) by mouth every 6 (six) hours as needed for nausea or vomiting  Qty: 20 tablet, Refills: 0    Associated Diagnoses: Primary osteoarthritis of left knee      !! ondansetron (ZOFRAN-ODT) 4 mg disintegrating tablet Take 1 tablet (4 mg total) by mouth every 6 (six) hours as needed for nausea or vomiting  Qty: 20 tablet, Refills: 5    Associated Diagnoses: Primary osteoarthritis of right knee      Semaglutide-Weight Management (WEGOVY) 1.7 MG/0.75ML Inject 0.75 mL (1.7 mg total) under the skin once a week  Qty: 3 mL, Refills: 0    Associated Diagnoses: Morbid obesity with BMI of 40.0-44.9, adult (HCC)      venlafaxine (EFFEXOR-XR) 150 mg 24 hr capsule Take 1 capsule (150 mg total) by mouth daily with breakfast  Qty: 90 capsule, Refills: 1    Associated Diagnoses: Migraine without status migrainosus, not intractable, unspecified migraine type       !! - Potential duplicate medications found. Please discuss with provider.        No discharge procedures on file.  ED SEPSIS DOCUMENTATION   Time reflects when diagnosis was documented in both MDM as applicable and the Disposition within this note       Time User Action Codes Description Comment    3/10/2025  1:03 PM Farooq Lion Add [T14.8XXA,  L08.9] Infected hematoma                  Farooq Lion DO  03/10/25  1413

## 2025-03-10 NOTE — PROGRESS NOTES
Kaitlynn Madera is a 40 y.o. female who is currently ordered Vancomycin IV with management by the Pharmacy Consult service.  Relevant clinical data and objective / subjective history reviewed.  Vancomycin Assessment:  Indication and Goal AUC/Trough: Soft tissue (goal -600, trough >10), -600, trough >10  Clinical Status: New start  Micro:   Blood culture x2: pending  Renal Function:  SCr: 0.76 mg/dL  CrCl: 107.1 mL/min  Renal replacement: Not on dialysis  Days of Therapy: 1  Current Dose: 1750mg IV once  Vancomycin Plan:  New Dosinmg IV Q12H  Estimated AUC: 495 mcg*hr/mL  Estimated Trough: 14.1 mcg/mL  Next Level: With morning labs at approximately 0500 on 3/12/25  Renal Function Monitoring: Daily BMP and UOP  Pharmacy will continue to follow closely for s/sx of nephrotoxicity, infusion reactions and appropriateness of therapy.  BMP and CBC will be ordered per protocol. We will continue to follow the patient’s culture results and clinical progress daily.    Manuela Patterson, Pharmacist

## 2025-03-10 NOTE — ASSESSMENT & PLAN NOTE
-190 on admission   Patient is not on anti-hypertensives at baseline   Suspect anxiety contributing > outpatient visits show   PRN labetalol   Hold off on standing anti-hypertensives

## 2025-03-10 NOTE — PROGRESS NOTES
Knee Follow up Office Note    Assessment:     1. Infected hematoma    2. Aftercare following right knee joint replacement surgery          Assessment & Plan  Infected hematoma  41 y/o female with a history of a Right medial UKA from 3/7/24.  She was kicked by her horse about 1 month ago in the medial anterior tibia which has developed into an infected hematoma with cellulitis.  Xrays of the right knee and tib/fib were reviewed showing no acute fractures or gross loosening. We discussed that since she has failed 6 days of oral antibiotics, she will require IV antibiotics.  Discussed that she may require an I&D of the hematoma of she does not improve or continues to worsen despite the IV antibiotics.  We recommend a CT scan of the RLE as well as blood work including CRP, sed rate, and CBC.  Patient is agreeable to going to  ED for admission, IV abx, and further workup.       Aftercare following right knee joint replacement surgery  41 y/o female with a history of a Right medial UKA from 3/7/24.          Subjective:     Patient ID: Kaitlynn Madera is a 40 y.o. female.  Chief Complaint:  HPI:  40 y.o. female presents to the office for a follow up evaluation of her right knee and right leg.  She is 1 year s/p Right medial UKA from 3/7/24.  She states that about 1 month ago she spooked her horse and she was kicked in the right lower leg/shin.  She initially developed a large bruise in the anteromedial lower leg which resolved.  She then started developing continued swelling of the lower extremity with redness and increased pain. She was seen at Urgent Care  on 3/4 where she had tib/fib xrays performed and was placed on Keflex for cellulitis.  She states that the redness and pain has been worsening and not improving on the oral antibiotics.    Allergy:  Allergies   Allergen Reactions    Bactrim [Sulfamethoxazole-Trimethoprim] Anaphylaxis and Edema     Annotation - [id number]: painful to swallow     Medications:  all  current active meds have been reviewed  Past Medical History:  Past Medical History:   Diagnosis Date    Anemia     hx of anemia - per pt was told with trying to donate blood    Anesthesia     2/12/24 per pt - reports with epidural use - pt experienced big BP drop    Anxiety     Blood type, Rh negative     Resolved 2/18/2016     COVID-19     9/30/24 per pt symptoms start 9/16- dx with COVID 9/19 - did see MD for MC 9/24 and cleared ( MD aware of COVID)    Kidney stone     Knee pain, left     Knee pain, right     Migraine     Obesity     PONV (postoperative nausea and vomiting)      Past Surgical History:  Past Surgical History:   Procedure Laterality Date    EAR SURGERY      Last impression 8/5/2015     MYRINGOTOMY      WI ARTHRP KNEE CONDYLE&PLATEAU MEDIAL/LAT CMPRT Right 3/7/2024    Procedure: ARTHROPLASTY KNEE UNICOMPARTMENTAL;  Surgeon: León Gray DO;  Location: [facility];  Service: Orthopedics    WI ARTHRP KNEE CONDYLE&PLATEAU MEDIAL/LAT CMPRT Left 10/17/2024    Procedure: ARTHROPLASTY KNEE UNICOMPARTMENTAL;  Surgeon: León Gray DO;  Location: [facility];  Service: Orthopedics    TONSILLECTOMY      WISDOM TOOTH EXTRACTION       Family History:  Family History   Problem Relation Age of Onset    Anesthesia problems Mother     Goiter Mother     Breast cancer Mother 62    Mental illness Mother     Hypertension Mother     Thyroid disease Mother     Anxiety disorder Mother     Polycythemia Father     Hearing loss Father         wears hearing aids    Colon cancer Maternal Grandmother     Dementia Maternal Grandmother     Hypertension Maternal Grandmother     Diabetes Maternal Grandfather     Hypertension Maternal Grandfather     Colon cancer Maternal Grandfather     Heart attack Maternal Grandfather     Uterine cancer Paternal Grandmother     Hypertension Paternal Grandmother     Cancer Paternal Grandmother         uterine/ovarian cancer    Ovarian cancer Paternal Grandmother     Diabetes  Paternal Grandfather     Dementia Paternal Grandfather     Glaucoma Paternal Grandfather     Heart attack Paternal Grandfather     Hyperthyroidism Maternal Aunt     Alcohol abuse Neg Hx     Substance Abuse Neg Hx      Social History:  Social History     Substance and Sexual Activity   Alcohol Use Not Currently    Alcohol/week: 2.0 standard drinks of alcohol    Types: 2 Glasses of wine per week    Comment: Socially-- avg use couple times a month     Social History     Substance and Sexual Activity   Drug Use No    Comment: Denies any drug use per pt     Social History     Tobacco Use   Smoking Status Never   Smokeless Tobacco Never   Tobacco Comments    Never a smoker or use of any tobacco products per pt          ROS:  General: Per HPI  Skin: Negative, except if noted below  HEENT: Negative  Respiratory: Negative  Cardiovascular: Negative  Gastrointestinal: Negative  Urinary: Negative  Vascular: Negative  Musculoskeletal: Positive per HPI   Neurologic: Positive per HPI  Endocrine: Negative    Objective:  BP Readings from Last 1 Encounters:   03/10/25 (!) 170/109      Wt Readings from Last 1 Encounters:   03/04/25 100 kg (220 lb 9.6 oz)        Respiratory:   non-labored respirations    Lymphatics:  no palpable lymph nodes    Gait:   Steady    Neurologic:   Alert and oriented times 3  Patient with normal sensation except as noted below  Deep tendon reflexes 2+ except as noted in MSK exam    Bilateral Lower Extremity:  Right Knee:      Inspection:  well healed incision +swelling and erythema over the anteromedial aspect of the lower leg with erythema and warmth    Overall limb alignment neutral    Effusion: minimal    ROM 3-120 without pain    Extensor Lag: none    Palpation: no Joint line tenderness to palpation. +TTP over left LE in area of palpable hematoma. +warmth to palpation    AP Stability at 90 deg stable    M/L stability in full extension stable    M/L stability in midflexion stable    Motor: 5/5  "Q/HS/TA/GS/P    Pulses: 2+ DP / 2+ PT    SILT DP/SP/S/S/TN    Imaging:  Xray right knee: s/p cemented medial UKA, components in good position. No fracture or dislocation.    Xray right tib/fib: No acute fracture noted    BMI:   Estimated body mass index is 40.02 kg/m² as calculated from the following:    Height as of 3/4/25: 5' 2.25\" (1.581 m).    Weight as of 3/4/25: 100 kg (220 lb 9.6 oz).  BSA:   Estimated body surface area is 2 meters squared as calculated from the following:    Height as of 3/4/25: 5' 2.25\" (1.581 m).    Weight as of 3/4/25: 100 kg (220 lb 9.6 oz).           Scribe Attestation      I,:  Emi García PA-C am acting as a scribe while in the presence of the attending physician.:       I,:  León Gray DO personally performed the services described in this documentation    as scribed in my presence.:              "

## 2025-03-10 NOTE — ASSESSMENT & PLAN NOTE
Patient was kicked by horse anterior right shin 2/11/2025 initially with bruising which then faded however she noticed new erythema and tenderness about 1 week ago.  Placed on Keflex by urgent care 3/4. No improvement, saw outpatient ortho 3/10 who recommended ED evaluation.  Not meeting SIRS criteria  CT right lower extremity: 6.6 cm collection in the pretibial soft tissues of the anteromedial mid calf, likely hematoma. Superinfection cannot be excluded by imaging. No soft tissue gas. Mild circumferential subcutaneous fat stranding and skin thickening extending from just below the knee through the ankle, which can be seen with cellulitis.  ESR 28  CRP: 32.6  ABX: Initiated on IV cefepime/vancomycin in the ED, continue for now, no wound for culture at this time   Orthopedic consult, appreciate recommendations

## 2025-03-11 LAB
ANION GAP SERPL CALCULATED.3IONS-SCNC: 7 MMOL/L (ref 4–13)
ATRIAL RATE: 94 BPM
BASOPHILS # BLD AUTO: 0.08 THOUSANDS/ÂΜL (ref 0–0.1)
BASOPHILS NFR BLD AUTO: 1 % (ref 0–1)
BUN SERPL-MCNC: 11 MG/DL (ref 5–25)
CALCIUM SERPL-MCNC: 8.5 MG/DL (ref 8.4–10.2)
CHLORIDE SERPL-SCNC: 104 MMOL/L (ref 96–108)
CO2 SERPL-SCNC: 26 MMOL/L (ref 21–32)
CREAT SERPL-MCNC: 0.62 MG/DL (ref 0.6–1.3)
EOSINOPHIL # BLD AUTO: 0.35 THOUSAND/ÂΜL (ref 0–0.61)
EOSINOPHIL NFR BLD AUTO: 5 % (ref 0–6)
ERYTHROCYTE [DISTWIDTH] IN BLOOD BY AUTOMATED COUNT: 13.8 % (ref 11.6–15.1)
GFR SERPL CREATININE-BSD FRML MDRD: 111 ML/MIN/1.73SQ M
GLUCOSE SERPL-MCNC: 78 MG/DL (ref 65–140)
HCT VFR BLD AUTO: 42 % (ref 34.8–46.1)
HGB BLD-MCNC: 13.5 G/DL (ref 11.5–15.4)
IMM GRANULOCYTES # BLD AUTO: 0.04 THOUSAND/UL (ref 0–0.2)
IMM GRANULOCYTES NFR BLD AUTO: 1 % (ref 0–2)
LYMPHOCYTES # BLD AUTO: 1.8 THOUSANDS/ÂΜL (ref 0.6–4.47)
LYMPHOCYTES NFR BLD AUTO: 25 % (ref 14–44)
MCH RBC QN AUTO: 28.7 PG (ref 26.8–34.3)
MCHC RBC AUTO-ENTMCNC: 32.1 G/DL (ref 31.4–37.4)
MCV RBC AUTO: 89 FL (ref 82–98)
MONOCYTES # BLD AUTO: 0.47 THOUSAND/ÂΜL (ref 0.17–1.22)
MONOCYTES NFR BLD AUTO: 7 % (ref 4–12)
NEUTROPHILS # BLD AUTO: 4.5 THOUSANDS/ÂΜL (ref 1.85–7.62)
NEUTS SEG NFR BLD AUTO: 61 % (ref 43–75)
NRBC BLD AUTO-RTO: 0 /100 WBCS
P AXIS: 61 DEGREES
PLATELET # BLD AUTO: 309 THOUSANDS/UL (ref 149–390)
PMV BLD AUTO: 9.5 FL (ref 8.9–12.7)
POTASSIUM SERPL-SCNC: 4.3 MMOL/L (ref 3.5–5.3)
PR INTERVAL: 138 MS
QRS AXIS: 97 DEGREES
QRSD INTERVAL: 94 MS
QT INTERVAL: 368 MS
QTC INTERVAL: 460 MS
RBC # BLD AUTO: 4.71 MILLION/UL (ref 3.81–5.12)
SODIUM SERPL-SCNC: 137 MMOL/L (ref 135–147)
T WAVE AXIS: 45 DEGREES
VENTRICULAR RATE: 94 BPM
WBC # BLD AUTO: 7.24 THOUSAND/UL (ref 4.31–10.16)

## 2025-03-11 PROCEDURE — 80048 BASIC METABOLIC PNL TOTAL CA: CPT

## 2025-03-11 PROCEDURE — 85025 COMPLETE CBC W/AUTO DIFF WBC: CPT

## 2025-03-11 PROCEDURE — 93971 EXTREMITY STUDY: CPT | Performed by: STUDENT IN AN ORGANIZED HEALTH CARE EDUCATION/TRAINING PROGRAM

## 2025-03-11 PROCEDURE — 93010 ELECTROCARDIOGRAM REPORT: CPT | Performed by: INTERNAL MEDICINE

## 2025-03-11 PROCEDURE — 99254 IP/OBS CNSLTJ NEW/EST MOD 60: CPT | Performed by: STUDENT IN AN ORGANIZED HEALTH CARE EDUCATION/TRAINING PROGRAM

## 2025-03-11 RX ORDER — DIPHENHYDRAMINE HYDROCHLORIDE 50 MG/ML
25 INJECTION, SOLUTION INTRAMUSCULAR; INTRAVENOUS ONCE
Status: COMPLETED | OUTPATIENT
Start: 2025-03-11 | End: 2025-03-11

## 2025-03-11 RX ORDER — MAGNESIUM SULFATE HEPTAHYDRATE 40 MG/ML
2 INJECTION, SOLUTION INTRAVENOUS ONCE
Status: COMPLETED | OUTPATIENT
Start: 2025-03-11 | End: 2025-03-11

## 2025-03-11 RX ORDER — KETOROLAC TROMETHAMINE 30 MG/ML
15 INJECTION, SOLUTION INTRAMUSCULAR; INTRAVENOUS ONCE
Status: COMPLETED | OUTPATIENT
Start: 2025-03-11 | End: 2025-03-11

## 2025-03-11 RX ORDER — METOCLOPRAMIDE HYDROCHLORIDE 5 MG/ML
10 INJECTION INTRAMUSCULAR; INTRAVENOUS ONCE
Status: COMPLETED | OUTPATIENT
Start: 2025-03-11 | End: 2025-03-11

## 2025-03-11 RX ADMIN — CEFEPIME HYDROCHLORIDE 2000 MG: 2 INJECTION, SOLUTION INTRAVENOUS at 12:19

## 2025-03-11 RX ADMIN — VANCOMYCIN HYDROCHLORIDE 1000 MG: 1 INJECTION, SOLUTION INTRAVENOUS at 17:46

## 2025-03-11 RX ADMIN — VANCOMYCIN HYDROCHLORIDE 1000 MG: 1 INJECTION, SOLUTION INTRAVENOUS at 05:00

## 2025-03-11 RX ADMIN — KETOROLAC TROMETHAMINE 15 MG: 30 INJECTION, SOLUTION INTRAMUSCULAR; INTRAVENOUS at 09:42

## 2025-03-11 RX ADMIN — DIPHENHYDRAMINE HYDROCHLORIDE 25 MG: 50 INJECTION, SOLUTION INTRAMUSCULAR; INTRAVENOUS at 09:42

## 2025-03-11 RX ADMIN — METOCLOPRAMIDE 10 MG: 5 INJECTION, SOLUTION INTRAMUSCULAR; INTRAVENOUS at 09:42

## 2025-03-11 RX ADMIN — ACETAMINOPHEN 650 MG: 325 TABLET, FILM COATED ORAL at 15:01

## 2025-03-11 RX ADMIN — VENLAFAXINE HYDROCHLORIDE 150 MG: 150 CAPSULE, EXTENDED RELEASE ORAL at 09:38

## 2025-03-11 RX ADMIN — MAGNESIUM SULFATE HEPTAHYDRATE 2 G: 40 INJECTION, SOLUTION INTRAVENOUS at 09:43

## 2025-03-11 NOTE — ASSESSMENT & PLAN NOTE
Duplex study shows no acute or chronic DVT  CT of the right lower extremity shows subcutaneous fat stranding and skin thickening overlying the pretibial soft tissue with extension to the ankle  Previous Unicompartmental knee arthroplasty with Dr. Gray on 3/7/25  Overlying cellulitis overlying anterior shin on examination  WBAT RLE  Patient currently on IV cefepime and vanco  Will continue to follow for improvement or worsening of symptoms, possible I&D if worsening symptoms despite IV abx  Will continue to follow and monitor

## 2025-03-11 NOTE — ASSESSMENT & PLAN NOTE
-190 on admission   Patient is not on anti-hypertensives at baseline   Suspect anxiety/ white coat contributing > outpatient visits show   Patient reports similar Bps during prior admissions, she was placed on multiple medications which then caused hypotension   PRN labetalol   Hold off on standing anti-hypertensives

## 2025-03-11 NOTE — UTILIZATION REVIEW
NOTIFICATION OF INPATIENT ADMISSION   AUTHORIZATION REQUEST   SERVICING FACILITY:   [place] [address]  Tax ID: [id number]  NPI: [id number] ATTENDING PROVIDER:  Attending Name and NPI#: Lulú Griffin Md [[id number]]  Address: [address] [place] [address]  Phone: [phone]   ADMISSION INFORMATION:  Place of Service: Inpatient [facility]  Place of Service Code: 21  Inpatient Admission Date/Time: 3/10/25  1:04 PM  Discharge Date/Time: No discharge date for patient encounter.  Admitting Diagnosis Code/Description:  Leg injury [S89.90XA]  Infected hematoma [T14.8XXA, L08.9]     UTILIZATION REVIEW CONTACT:  Dolores Wolfe Utilization   Network Utilization Review Department  Phone: [phone]  Fax: [phone]  Email: [email or web address]  Contact for approvals/pending authorizations, clinical reviews, and discharge.     PHYSICIAN ADVISORY SERVICES:  Medical Necessity Denial & Jqbs-qz-Jsbj Review  Phone: [phone]  Fax: [phone]  Email: [email or web address]     DISCHARGE SUPPORT TEAM:  For Patients Discharge Needs & Updates  Phone: [phone] opt. 2 Fax: [phone]  Email: [email or web address]

## 2025-03-11 NOTE — PROGRESS NOTES
Progress Note - Hospitalist   Name: Kaitlynn Madera 40 y.o. female I MRN: [id number]  Unit/Bed#: [facility] I Date of Admission: 3/10/2025   Date of Service: 3/11/2025 I Hospital Day: 1    Assessment & Plan  Infection of wound hematoma  Patient was kicked by horse anterior right shin 2/11/2025 initially with bruising which then faded however she noticed new erythema and tenderness about 1 week ago.  Placed on Keflex by urgent care 3/4. No improvement, saw outpatient ortho 3/10 who recommended ED evaluation.  Not meeting SIRS criteria  CT right lower extremity: 6.6 cm collection in the pretibial soft tissues of the anteromedial mid calf, likely hematoma. Superinfection cannot be excluded by imaging. No soft tissue gas. Mild circumferential subcutaneous fat stranding and skin thickening extending from just below the knee through the ankle, which can be seen with cellulitis.  ESR 28  CRP: 32.6  ABX: IV cefepime/vancomycin, continue for now (day 2)  Orthopedic surgery consulted  Continue conservative tx with IV abx  If worsening or failure to improve will consider I&D  Elevated blood pressure reading  -190 on admission   Patient is not on anti-hypertensives at baseline   Suspect anxiety/ white coat contributing > outpatient visits show   Patient reports similar Bps during prior admissions, she was placed on multiple medications which then caused hypotension   PRN labetalol   Hold off on standing anti-hypertensives   Anxiety  Continue Effexor  Continue home xanax PRN patient reports last taking about 2 weeks ago  Migraine without aura and without status migrainosus, not intractable  Hx of migraines   3/11: mild HA consistent with chronic pattern --> trial Migraine cocktail + mag this am,    VTE Pharmacologic Prophylaxis: VTE Score: 3 Moderate Risk (Score 3-4) - Pharmacological DVT Prophylaxis Ordered: heparin.    Mobility:   Basic Mobility Inpatient Raw Score: 24  -[facility] Goal: 8: Walk 250 feet or  more  JH-HLM Achieved: 8: Walk 250 feet ot more  JH-HLM Goal achieved. Continue to encourage appropriate mobility.    Patient Centered Rounds: I performed bedside rounds with nursing staff today.   Discussions with Specialists or Other Care Team Provider: ortho    Education and Discussions with Family / Patient: Updated  () via phone.    Current Length of Stay: 1 day(s)  Current Patient Status: Inpatient   Certification Statement: The patient will continue to require additional inpatient hospital stay due to IV medications   Discharge Plan: Anticipate discharge in 24-48 hrs to home.    Code Status: Level 1 - Full Code    Subjective   Patient overall well, migraine which started this am. RLE unchanged from yesterday, tender to palpation. No respiratory or GI complaints.     Objective :  Temp:  [96.6 °F (35.9 °C)-98.8 °F (37.1 °C)] 96.6 °F (35.9 °C)  HR:  [] 75  BP: (160-199)/(103-121) 160/107  Resp:  [13-20] 16  SpO2:  [95 %-98 %] 95 %  O2 Device: None (Room air)    Body mass index is 40.02 kg/m².     Input and Output Summary (last 24 hours):     Intake/Output Summary (Last 24 hours) at 3/11/2025 1016  Last data filed at 3/11/2025 0900  Gross per 24 hour   Intake 500 ml   Output --   Net 500 ml       Physical Exam  Vitals and nursing note reviewed.   Constitutional:       General: She is not in acute distress.     Appearance: She is well-developed.   HENT:      Head: Normocephalic and atraumatic.   Eyes:      General:         Right eye: No discharge.         Left eye: No discharge.      Extraocular Movements: Extraocular movements intact.      Conjunctiva/sclera: Conjunctivae normal.   Cardiovascular:      Rate and Rhythm: Normal rate and regular rhythm.      Heart sounds: No murmur heard.  Pulmonary:      Effort: Pulmonary effort is normal. No respiratory distress.      Breath sounds: Normal breath sounds. No wheezing, rhonchi or rales.   Abdominal:      General: Bowel sounds are normal.  There is no distension.      Palpations: Abdomen is soft.      Tenderness: There is no abdominal tenderness.   Musculoskeletal:      Cervical back: Neck supple.      Right lower leg: No edema.      Left lower leg: No edema.   Skin:     General: Skin is warm and dry.      Capillary Refill: Capillary refill takes less than 2 seconds.      Findings: Erythema and rash present.   Neurological:      General: No focal deficit present.      Mental Status: She is alert and oriented to person, place, and time. Mental status is at baseline.      Cranial Nerves: No cranial nerve deficit.   Psychiatric:         Mood and Affect: Mood normal.         Behavior: Behavior normal.          Lines/Drains:              Lab Results: I have reviewed the following results:   Results from last 7 days   Lab Units 03/11/25  0459   WBC Thousand/uL 7.24   HEMOGLOBIN g/dL 13.5   HEMATOCRIT % 42.0   PLATELETS Thousands/uL 309   SEGS PCT % 61   LYMPHO PCT % 25   MONO PCT % 7   EOS PCT % 5     Results from last 7 days   Lab Units 03/11/25  0459 03/10/25  1244   SODIUM mmol/L 137 137   POTASSIUM mmol/L 4.3 4.1   CHLORIDE mmol/L 104 103   CO2 mmol/L 26 29   BUN mg/dL 11 16   CREATININE mg/dL 0.62 0.76   ANION GAP mmol/L 7 5   CALCIUM mg/dL 8.5 9.5   ALBUMIN g/dL  --  4.3   TOTAL BILIRUBIN mg/dL  --  0.29   ALK PHOS U/L  --  53   ALT U/L  --  11   AST U/L  --  11*   GLUCOSE RANDOM mg/dL 78 74     Results from last 7 days   Lab Units 03/10/25  1244   INR  0.91             Results from last 7 days   Lab Units 03/10/25  1244   LACTIC ACID mmol/L 0.7   PROCALCITONIN ng/ml <0.05       Recent Cultures (last 7 days):   Results from last 7 days   Lab Units 03/10/25  1244   BLOOD CULTURE  Received in Microbiology Lab. Culture in Progress.  Received in Microbiology Lab. Culture in Progress.       Imaging Results Review: No pertinent imaging studies reviewed.  Other Study Results Review: No additional pertinent studies reviewed.    Last 24 Hours Medication List:      Current Facility-Administered Medications:     acetaminophen (TYLENOL) tablet 650 mg, Q6H PRN    ALPRAZolam (XANAX) tablet 0.25 mg, Daily PRN    cefepime (MAXIPIME) IVPB (premix in dextrose) 2,000 mg 50 mL, Q12H, Last Rate: 2,000 mg (03/10/25 7073)    heparin (porcine) subcutaneous injection 5,000 Units, Q8H NATE    ibuprofen (MOTRIN) tablet 400 mg, Q6H PRN    labetalol (NORMODYNE) injection 10 mg, Q12H PRN    magnesium sulfate 2 g/50 mL IVPB (premix) 2 g, Once, Last Rate: 2 g (03/11/25 0970)    vancomycin (VANCOCIN) IVPB (premix in dextrose) 1,000 mg 200 mL, Q12H, Last Rate: 1,000 mg (03/11/25 0500)    venlafaxine (EFFEXOR-XR) 24 hr capsule 150 mg, Daily With Breakfast    Administrative Statements   Today, Patient Was Seen By: Sobeida Diamond PA-C  I have spent a total time of 35 minutes in caring for this patient on the day of the visit/encounter including Diagnostic results, Documenting in the medical record, Reviewing/placing orders in the medical record (including tests, medications, and/or procedures), Obtaining or reviewing history  , and Communicating with other healthcare professionals .    **Please Note: This note may have been constructed using a voice recognition system.**

## 2025-03-11 NOTE — ASSESSMENT & PLAN NOTE
Hx of migraines   3/11: mild HA consistent with chronic pattern --> trial Migraine cocktail + mag this am,

## 2025-03-11 NOTE — CASE MANAGEMENT
Case Management Assessment & Discharge Planning Note    Patient name Kaitlynn Madera  Location /-01 MRN [id number]  : 1985 Date 3/11/2025       Current Admission Date: 3/10/2025  Current Admission Diagnosis:Infection of wound hematoma   Patient Active Problem List    Diagnosis Date Noted Date Diagnosed    Infection of wound hematoma 03/10/2025     Elevated blood pressure reading 03/10/2025     Anxiety 03/10/2025     PONV (postoperative nausea and vomiting) 10/17/2024     Obesity (BMI 30-39.9) 10/17/2024     Primary osteoarthritis of left knee 2024     Primary osteoarthritis of right knee 2024     Mixed hyperlipidemia 2023     IUD (intrauterine device) in place 2021     Radiculitis of right cervical region 2020     Depression with anxiety 2018     Motion sickness 2017     Migraine without aura and without status migrainosus, not intractable 2015       LOS (days): 1  Geometric Mean LOS (GMLOS) (days):   Days to GMLOS:     OBJECTIVE:    Risk of Unplanned Readmission Score: 6.62         Current admission status: Inpatient       Preferred Pharmacy:   [facility] [place] - [address]  [address]  [place] [address]  Phone: [phone] Fax: [phone]    [facility]pharmacy #[id number] [place] - [address]  [address]  [place] [address]  Phone: [phone] Fax: [phone]    [facility]  [place] - [address] [facility]  [address] [facility] [address],  First [address] [facility] [place] [address]  Phone: [phone] Fax: [phone]    [facility] [place] - [address]  [address]  [place] [address]  Phone: [phone] Fax: [phone]    Primary Care Provider: Shannon Murrieta PA-C    Primary Insurance: CAPITAL  Secondary Insurance:     ASSESSMENT:  Active Health Care Proxies    There are no active Health Care Proxies on  file.       Advance Directives  Does patient have a Health Care POA?: No  Was patient offered paperwork?: Yes (declined)  Does patient currently have a Health Care decision maker?: Yes, please see Health Care Proxy section  Does patient have Advance Directives?: No  Was patient offered paperwork?: Yes (declined)  Primary Contact: Steve castro, spouse              Patient Information  Admitted from:: Home  Mental Status: Alert  During Assessment patient was accompanied by: Not accompanied during assessment  Assessment information provided by:: Patient  Primary Caregiver: Self  Support Systems: Self, Spouse/significant other, Family members  County of Residence: [place]  What [facility] do you live in?: [place]  Home entry access options. Select all that apply.: Stairs  Number of steps to enter home.: 2  Type of Current Residence: 2 story home  Upon entering residence, is there a bedroom on the main floor (no further steps)?: No  A bedroom is located on the following floor levels of residence (select all that apply):: 2nd Floor  Upon entering residence, is there a bathroom on the main floor (no further steps)?: Yes  Number of steps to 2nd floor from main floor: One Flight  Living Arrangements: Lives w/ Spouse/significant other, Lives w/ Children    Activities of Daily Living Prior to Admission  Functional Status: Independent  Completes ADLs independently?: Yes  Ambulates independently?: Yes  Does patient use assisted devices?: No  Does patient currently own DME?: Yes  What DME does the patient currently own?: Straight Cane, Walker  Does patient have a history of Outpatient Therapy (PT/OT)?: Yes  Does the patient have a history of Short-Term Rehab?: No  Does patient have a history of HHC?: No  Does patient currently have HHC?: No         Patient Information Continued  Income Source: Unemployed  Does patient have prescription coverage?: Yes  Can the patient afford their medications and any related supplies (such as  glucometers or test strips)?: Yes  Does patient receive dialysis treatments?: No  Does patient have a history of substance abuse?: No  Does patient have a history of Mental Health Diagnosis?: Yes (anxiety, depression)  Is patient receiving treatment for mental health?: No. Patient declined treatment information.  Has patient received inpatient treatment related to mental health in the last 2 years?: No         Means of Transportation  Means of Transport to [facility]:: Drives Self          DISCHARGE DETAILS:    Discharge planning discussed with:: Patient  Freedom of Choice: Yes     CM contacted family/caregiver?: No- see comments (Pt is alert and oriented)  Were Treatment Team discharge recommendations reviewed with patient/caregiver?: Yes  Did patient/caregiver verbalize understanding of patient care needs?: Yes  Were patient/caregiver advised of the risks associated with not following Treatment Team discharge recommendations?: Yes         Requested Home Health Care         Is the patient interested in HHC at discharge?: No                   Treatment Team Recommendation: Home  Discharge Destination Plan:: Home  Transport at Discharge : Family                                      Additional Comments: CM met with pt to discuss role of CM and any needs prior to discharge. Pt resides w/ spouse in 2SH w/ 2STE and 2nd flr setup. Indp PTA. Owns SPC, RW. Pt is unemployed and drives. Hx OP PT and anxiety & depression. No IP psych stays. No hx STR/HH/DA. Pt prefers to use [facility] pharmacy in [place]. Spouse will transport at discharge.

## 2025-03-11 NOTE — ASSESSMENT & PLAN NOTE
Patient was kicked by horse anterior right shin 2/11/2025 initially with bruising which then faded however she noticed new erythema and tenderness about 1 week ago.  Placed on Keflex by urgent care 3/4. No improvement, saw outpatient ortho 3/10 who recommended ED evaluation.  Not meeting SIRS criteria  CT right lower extremity: 6.6 cm collection in the pretibial soft tissues of the anteromedial mid calf, likely hematoma. Superinfection cannot be excluded by imaging. No soft tissue gas. Mild circumferential subcutaneous fat stranding and skin thickening extending from just below the knee through the ankle, which can be seen with cellulitis.  ESR 28  CRP: 32.6  ABX: IV cefepime/vancomycin, continue for now (day 2)  Orthopedic surgery consulted  Continue conservative tx with IV abx  If worsening or failure to improve will consider I&D

## 2025-03-11 NOTE — PROGRESS NOTES
Kaitlynn Madera is a 40 y.o. female who is currently ordered Vancomycin IV with management by the Pharmacy Consult service.  Relevant clinical data and objective / subjective history reviewed.  Vancomycin Assessment:  Indication and Goal AUC/Trough: Soft tissue (goal -600, trough >10), -600, trough >10  Clinical Status: stable  Micro:     Renal Function:  SCr: 0.98 mg/dL  CrCl: 131.4 mL/min  Renal replacement: Not on dialysis  Days of Therapy: 3  Current Dose: 1000 mg Q12h  Vancomycin Plan:  New Dosing: continue the same  Estimated AUC: 418 mcg*hr/mL  Estimated Trough: 11.1 mcg/mL  Next Level: random trough at 0500 am on 03/12  Renal Function Monitoring: Daily BMP and UOP  Pharmacy will continue to follow closely for s/sx of nephrotoxicity, infusion reactions and appropriateness of therapy.  BMP and CBC will be ordered per protocol. We will continue to follow the patient’s culture results and clinical progress daily.    Ivonne Olivares, Pharmacist

## 2025-03-11 NOTE — PLAN OF CARE
Problem: PAIN - ADULT  Goal: Verbalizes/displays adequate comfort level or baseline comfort level  Description: Interventions:  - Encourage patient to monitor pain and request assistance  - Assess pain using appropriate pain scale  - Administer analgesics based on type and severity of pain and evaluate response  - Implement non-pharmacological measures as appropriate and evaluate response  - Consider cultural and social influences on pain and pain management  - Notify physician/advanced practitioner if interventions unsuccessful or patient reports new pain  Outcome: Progressing     Problem: INFECTION - ADULT  Goal: Absence or prevention of progression during hospitalization  Description: INTERVENTIONS:  - Assess and monitor for signs and symptoms of infection  - Monitor lab/diagnostic results  - Monitor all insertion sites, i.e. indwelling lines, tubes, and drains  - Monitor endotracheal if appropriate and nasal secretions for changes in amount and color  - [place] appropriate cooling/warming therapies per order  - Administer medications as ordered  - Instruct and encourage patient and family to use good hand hygiene technique  - Identify and instruct in appropriate isolation precautions for identified infection/condition  Outcome: Progressing  Goal: Absence of fever/infection during neutropenic period  Description: INTERVENTIONS:  - Monitor WBC    Outcome: Progressing     Problem: SAFETY ADULT  Goal: Patient will remain free of falls  Description: INTERVENTIONS:  - Educate patient/family on patient safety including physical limitations  - Instruct patient to call for assistance with activity   - Consult OT/PT to assist with strengthening/mobility   - Keep Call bell within reach  - Keep bed low and locked with side rails adjusted as appropriate  - Keep care items and personal belongings within reach  - Initiate and maintain comfort rounds  - Make Fall Risk Sign visible to staff  - Offer Toileting every 2 Hours,  in advance of need  - Initiate/Maintain alarm  - Obtain necessary fall risk management equipment  - Apply yellow socks and bracelet for high fall risk patients  - Consider moving patient to room near nurses station  Outcome: Progressing  Goal: Maintain or return to baseline ADL function  Description: INTERVENTIONS:  -  Assess patient's ability to carry out ADLs; assess patient's baseline for ADL function and identify physical deficits which impact ability to perform ADLs (bathing, care of mouth/teeth, toileting, grooming, dressing, etc.)  - Assess/evaluate cause of self-care deficits   - Assess range of motion  - Assess patient's mobility; develop plan if impaired  - Assess patient's need for assistive devices and provide as appropriate  - Encourage maximum independence but intervene and supervise when necessary  - Involve family in performance of ADLs  - Assess for home care needs following discharge   - Consider OT consult to assist with ADL evaluation and planning for discharge  - Provide patient education as appropriate  Outcome: Progressing  Goal: Maintains/Returns to pre admission functional level  Description: INTERVENTIONS:  - Perform AM-PAC 6 Click Basic Mobility/ Daily Activity assessment daily.  - Set and communicate daily mobility goal to care team and patient/family/caregiver.   - Collaborate with rehabilitation services on mobility goals if consulted  - Perform Range of Motion 4 times a day.  - Reposition patient every 2 hours.  - Dangle patient 3 times a day  - Stand patient 3 times a day  - Ambulate patient 3 times a day  - Out of bed to chair 3 times a day   - Out of bed for meals 3 times a day  - Out of bed for toileting  - Record patient progress and toleration of activity level   Outcome: Progressing     Problem: DISCHARGE PLANNING  Goal: Discharge to home or other facility with appropriate resources  Description: INTERVENTIONS:  - Identify barriers to discharge w/patient and caregiver  - Arrange  for needed discharge resources and transportation as appropriate  - Identify discharge learning needs (meds, wound care, etc.)  - Arrange for interpretive services to assist at discharge as needed  - Refer to Case Management Department for coordinating discharge planning if the patient needs post-hospital services based on physician/advanced practitioner order or complex needs related to functional status, cognitive ability, or social support system  Outcome: Progressing     Problem: Knowledge Deficit  Goal: Patient/family/caregiver demonstrates understanding of disease process, treatment plan, medications, and discharge instructions  Description: Complete learning assessment and assess knowledge base.  Interventions:  - Provide teaching at level of understanding  - Provide teaching via preferred learning methods  Outcome: Progressing

## 2025-03-11 NOTE — UTILIZATION REVIEW
Initial Clinical Review    Admission: Date/Time/Statement:   Admission Orders (From admission, onward)       Ordered        03/10/25 1304  INPATIENT ADMISSION  Once                     Orders Placed This Encounter   Procedures    INPATIENT ADMISSION     Standing Status:   Standing     Number of Occurrences:   1     Level of Care:   Med Surg [16]     Estimated length of stay:   More than 2 Midnights     Certification:   I certify that inpatient services are medically necessary for this patient for a duration of greater than two midnights. See H&P and MD Progress Notes for additional information about the patient's course of treatment.     ED Arrival Information       Expected   -    Arrival   3/10/2025 12:11    Acuity   Urgent              Means of arrival   Walk-In    Escorted by   Self    Service   Hospitalist    Admission type   Emergency              Arrival complaint   Right Leg infection        Chief Complaint   Patient presents with    Leg Injury     Kicked by horse 1 month ago to E, started on abx 1 week ago without improvement.      Initial Presentation:   39 y/o female with PMHx bilateral osteoarthritis of the knee status post bilateral TKA's - right tKA 3/7/24 + left TKA 10/17/24 - now presents to [facility] ED on 3/10/25 2nd worsening right lower extremity pain and tenderness, edema and warmth over right anterior tibia after trauma on 2/11/25 when she was kicked in her shin by her horse.  Reports having increased erythema, warmth + tenderness, [facility] and started on outpatient Keflex without improvement.  Seen by Dr Gray in Orthopedic office today + directed to ED for Inpatient admission 2nd Infected hematoma right knee with failed outpatient treatment.  In ED - Temp 98.8    /121  Exam: anxious.  Right anterior tibia with area of swelling and surrounding erythema, tender, warm to touch.   CT lower extremity consistent with suspected infection of hematoma.  Labs: CRP 32.6    Sed rate 28  ED Tx: IV Cefepime, IV Vancomycin  Admitted Inpatient 3/10/25 at 1304 -    Infection of wound hematoma RLE  Patient was kicked by horse anterior right shin 2/11/2025 initially with bruising which then faded however she noticed new erythema and tenderness about 1 week ago.  Placed on Keflex by urgent care 3/4. No improvement, saw outpatient ortho 3/10 who recommended ED.   Not meeting SIRS criteria  CT right lower extremity: 6.6 cm collection in the pretibial soft tissues of the anteromedial mid calf, likely hematoma. Superinfection cannot be excluded by imaging. No soft tissue gas. Mild circumferential subcutaneous fat stranding and skin thickening extending from just below the knee through the ankle, which can be seen with cellulitis.  ESR 28  CRP: 32.6  ABX: Initiated on IV cefepime/vancomycin in the ED, continue for now, no wound for culture at this time   Orthopedic consult, appreciate recommendations     Anticipated Length of Stay: Patient will be admitted on an inpatient basis with an anticipated length of stay of greater than 2 midnights secondary to IV medications     3/11/25 INPATIENT DAY 2:  Infection of RLE wound hematoma  RLE unchanged from yesterday, tender to palpation.   MD Certification Statement: Requires additional inpatient hospital stay due to IV ABx      CT right lower extremity: 6.6 cm collection in the pretibial soft tissues of the anteromedial mid calf, likely hematoma. Superinfection cannot be excluded by imaging. No soft tissue gas. Mild circumferential subcutaneous fat stranding and skin thickening extending from just below the knee through the ankle, which can be seen with cellulitis.  ESR 28  CRP: 32.6  ABX: IV cefepime/vancomycin, continue for now (day 2)  Orthopedic surgery consulted  Continue conservative tx with IV abx  If worsening or failure to improve will consider I&D    3/11/25 ORTHO CONSULT:  Infection of RLE wound hematoma with failed outpatient ABx  treatment  Duplex study shows no acute or chronic DVT  CT of the right lower extremity shows subcutaneous fat stranding and skin thickening overlying the pretibial soft tissue with extension to the ankle  Previous Unicompartmental knee arthroplasty with Dr. Gray on 3/7/25  Overlying cellulitis overlying anterior shin on examination  WBAT RLE  Currently on IV cefepime and vanco  Will continue to follow for improvement or worsening of symptoms, possible I&D if worsening symptoms despite IV abx     ED Treatment-Medication Administration from 03/10/2025 1211 to 03/10/2025 1400         Date/Time Order Dose Route Action     03/10/2025 1248 cefepime (MAXIPIME) IVPB (premix in dextrose) 2,000 mg 50 mL 2,000 mg Intravenous New Bag     03/10/2025 1314 vancomycin (VANCOCIN) 1,750 mg in sodium chloride 0.9 % 500 mL IVPB 1,750 mg Intravenous New Bag     03/10/2025 1350 iohexol (OMNIPAQUE) 350 MG/ML injection (MULTI-DOSE) 100 mL 100 mL Intravenous Given     Scheduled Medications:  cefepime, 2,000 mg, Intravenous, Q12H  diphenhydrAMINE, 25 mg, Intravenous, Once  heparin (porcine), 5,000 Units, Subcutaneous, Q8H NATE  ketorolac, 15 mg, Intravenous, Once  magnesium sulfate, 2 g, Intravenous, Once  metoclopramide, 10 mg, Intravenous, Once  vancomycin, 15 mg/kg (Adjusted), Intravenous, Q12H  venlafaxine, 150 mg, Oral, Daily With Breakfast    Continuous IV Infusions:  NONE    PRN Meds:  acetaminophen, 650 mg, Oral, Q6H PRN  ALPRAZolam, 0.25 mg, Oral, Daily PRN  ibuprofen, 400 mg, Oral, Q6H PRN  labetalol, 10 mg, Intravenous, Q12H PRN    ED Triage Vitals [03/10/25 1236]   Temperature Pulse Respirations Blood Pressure SpO2 Pain Score   98.8 °F (37.1 °C) 103 20 (!) 199/121 97 % No Pain     Vital Signs (last 3 days)       Date/Time Temp Pulse Resp BP MAP (mmHg) SpO2 O2 Device Patient Position - Orthostatic VS Pain    03/10/25 23:22:59 -- 75 16 160/107 125 95 % None (Room air) -- No Pain    03/10/25 16:28:24 96.6 °F (35.9 °C) 93 16 168/112  131 96 % -- -- --    03/10/25 1537 -- -- -- -- -- 95 % None (Room air) -- --    03/10/25 1423 -- -- -- -- -- -- -- -- 2    03/10/25 14:07:48 96.6 °F (35.9 °C) -- -- 170/109 129 -- -- -- --    03/10/25 1312 -- 90 13 180/103 135 98 % -- -- --    03/10/25 1242 -- 97 -- 199/121 153 97 % -- -- --    03/10/25 1236 98.8 °F (37.1 °C) 103 20 199/121 -- 97 % None (Room air) Sitting No Pain     I/O 03/10  0701 03/11 0701    Intake   (mL/kg) 360   (--) 380   (--)    P.O. 360 380    Net +360 +380    Unmeasured Urine Occurrence 3  x 2  x    Unmeasured Stool Occurrence 1  x --      Pertinent Labs/Diagnostic Test Results:   CT lower extremity w contrast right   Final Interpretation  (03/10 1425)      6.6 cm collection in the pretibial soft tissues of the anteromedial mid calf, likely hematoma. Superinfection cannot be excluded by imaging. No soft tissue gas.      Mild circumferential subcutaneous fat stranding and skin thickening extending from just below the knee through the ankle, which can be seen with cellulitis.      VAS VENOUS DUPLEX -LOWER LIMB UNILATERAL    (Results Pending)       Results from last 7 days   Lab Units 03/11/25  0459 03/10/25  1244   WBC Thousand/uL 7.24 8.33   HEMOGLOBIN g/dL 13.5 14.3   HEMATOCRIT % 42.0 43.2   PLATELETS Thousands/uL 309 353   TOTAL NEUT ABS Thousands/µL 4.50 6.13       Results from last 7 days   Lab Units 03/11/25  0459 03/10/25  1244   SODIUM mmol/L 137 137   POTASSIUM mmol/L 4.3 4.1   CHLORIDE mmol/L 104 103   CO2 mmol/L 26 29   ANION GAP mmol/L 7 5   BUN mg/dL 11 16   CREATININE mg/dL 0.62 0.76   EGFR ml/min/1.73sq m 111 97   CALCIUM mg/dL 8.5 9.5     Results from last 7 days   Lab Units 03/10/25  1244   AST U/L 11*   ALT U/L 11   ALK PHOS U/L 53   TOTAL PROTEIN g/dL 7.6   ALBUMIN g/dL 4.3   TOTAL BILIRUBIN mg/dL 0.29       Results from last 7 days   Lab Units 03/11/25  0459 03/10/25  1244   GLUCOSE RANDOM mg/dL 78 74     Results from last 7 days   Lab Units 03/10/25  1244   PROTIME  seconds 12.7   INR  0.91   PTT seconds 27     Results from last 7 days   Lab Units 03/10/25  1244   PROCALCITONIN ng/ml <0.05     Results from last 7 days   Lab Units 03/10/25  1244   LACTIC ACID mmol/L 0.7     Results from last 7 days   Lab Units 03/10/25  1244   CRP mg/L 32.6*   SED RATE mm/hour 28*     Results from last 7 days   Lab Units 03/10/25  1244   BLOOD CULTURE  Received in Microbiology Lab. Culture in Progress.  Received in Microbiology Lab. Culture in Progress.     Past Medical History:   Diagnosis Date    Anemia     hx of anemia - per pt was told with trying to donate blood    Anesthesia     2/12/24 per pt - reports with epidural use - pt experienced big BP drop    Anxiety     Blood type, Rh negative     Resolved 2/18/2016     COVID-19     9/30/24 per pt symptoms start 9/16- dx with COVID 9/19 - did see MD for [facility] 9/24 and cleared ( MD aware of COVID)    Kidney stone     Knee pain, left     Knee pain, right     Migraine     Obesity     PONV (postoperative nausea and vomiting)      Admitting Diagnosis: Leg injury [S89.90XA]  Infected hematoma [T14.8XXA, L08.9]    Age/Sex: 40 y.o. female    Network Utilization Review Department  ATTENTION: Please call with any questions or concerns to [phone] and carefully listen to the prompts so that you are directed to the right person. All voicemails are confidential.   For Discharge needs, contact Care Management DC Support Team at [phone] opt. 2  Send all requests for admission clinical reviews, approved or denied determinations and any other requests to dedicated fax number below belonging to the campus where the patient is receiving treatment. List of dedicated fax numbers for the Facilities:  FACILITY NAME UR FAX NUMBER   ADMISSION DENIALS (Administrative/Medical Necessity) [phone]   DISCHARGE SUPPORT TEAM (NETWORK) [phone]   PARENT CHILD HEALTH (Maternity/NICU/Pediatrics) [phone]   [facility] [phone]    [facility] [phone]   [facility] [phone]   [facility] [phone]   [facility] [phone]   [facility] [phone]   [facility] [phone]   [facility] [phone]   [facility] [phone]   [facility] [phone]   [facility] [phone]   [facility] [phone]

## 2025-03-11 NOTE — PLAN OF CARE
Problem: PAIN - ADULT  Goal: Verbalizes/displays adequate comfort level or baseline comfort level  Description: Interventions:  - Encourage patient to monitor pain and request assistance  - Assess pain using appropriate pain scale  - Administer analgesics based on type and severity of pain and evaluate response  - Implement non-pharmacological measures as appropriate and evaluate response  - Consider cultural and social influences on pain and pain management  - Notify physician/advanced practitioner if interventions unsuccessful or patient reports new pain  Outcome: Progressing     Problem: INFECTION - ADULT  Goal: Absence or prevention of progression during hospitalization  Description: INTERVENTIONS:  - Assess and monitor for signs and symptoms of infection  - Monitor lab/diagnostic results  - Monitor all insertion sites, i.e. indwelling lines, tubes, and drains  - Monitor endotracheal if appropriate and nasal secretions for changes in amount and color  - [place] appropriate cooling/warming therapies per order  - Administer medications as ordered  - Instruct and encourage patient and family to use good hand hygiene technique  - Identify and instruct in appropriate isolation precautions for identified infection/condition  Outcome: Progressing  Goal: Absence of fever/infection during neutropenic period  Description: INTERVENTIONS:  - Monitor WBC    Outcome: Progressing     Problem: SAFETY ADULT  Goal: Patient will remain free of falls  Description: INTERVENTIONS:  - Educate patient/family on patient safety including physical limitations  - Instruct patient to call for assistance with activity   - Consult OT/PT to assist with strengthening/mobility   - Keep Call bell within reach  - Keep bed low and locked with side rails adjusted as appropriate  - Keep care items and personal belongings within reach  - Initiate and maintain comfort rounds  - Make Fall Risk Sign visible to staff  - Offer Toileting every x Hours,  in advance of need  - Initiate/Maintain xalarm  - Obtain necessary fall risk management equipment: xx  - Apply yellow socks and bracelet for high fall risk patients  - Consider moving patient to room near nurses station  Outcome: Progressing  Goal: Maintain or return to baseline ADL function  Description: INTERVENTIONS:  -  Assess patient's ability to carry out ADLs; assess patient's baseline for ADL function and identify physical deficits which impact ability to perform ADLs (bathing, care of mouth/teeth, toileting, grooming, dressing, etc.)  - Assess/evaluate cause of self-care deficits   - Assess range of motion  - Assess patient's mobility; develop plan if impaired  - Assess patient's need for assistive devices and provide as appropriate  - Encourage maximum independence but intervene and supervise when necessary  - Involve family in performance of ADLs  - Assess for home care needs following discharge   - Consider OT consult to assist with ADL evaluation and planning for discharge  - Provide patient education as appropriate  Outcome: Progressing  Goal: Maintains/Returns to pre admission functional level  Description: INTERVENTIONS:  - Perform AM-PAC 6 Click Basic Mobility/ Daily Activity assessment daily.  - Set and communicate daily mobility goal to care team and patient/family/caregiver.   - Collaborate with rehabilitation services on mobility goals if consulted  - Perform Range of Motion x times a day.  - Reposition patient every x hours.  - Dangle patient xx times a day  - Stand patient x times a day  - Ambulate patient x times a day  - Out of bed to chair x times a day   - Out of bed for meals xx times a day  - Out of bed for toileting  - Record patient progress and toleration of activity level   Outcome: Progressing     Problem: DISCHARGE PLANNING  Goal: Discharge to home or other facility with appropriate resources  Description: INTERVENTIONS:  - Identify barriers to discharge w/patient and caregiver  -  Arrange for needed discharge resources and transportation as appropriate  - Identify discharge learning needs (meds, wound care, etc.)  - Arrange for interpretive services to assist at discharge as needed  - Refer to Case Management Department for coordinating discharge planning if the patient needs post-hospital services based on physician/advanced practitioner order or complex needs related to functional status, cognitive ability, or social support system  Outcome: Progressing     Problem: Knowledge Deficit  Goal: Patient/family/caregiver demonstrates understanding of disease process, treatment plan, medications, and discharge instructions  Description: Complete learning assessment and assess knowledge base.  Interventions:  - Provide teaching at level of understanding  - Provide teaching via preferred learning methods  Outcome: Progressing

## 2025-03-11 NOTE — CONSULTS
Consultation - Orthopedics   Name: Kaitlynn Madera 40 y.o. female I MRN: [id number]  Unit/Bed#: -01 I Date of Admission: 3/10/2025   Date of Service: 3/11/2025 I Hospital Day: 1   Inpatient consult to Orthopedic Surgery  Consult performed by: Annemarie Gotti PA-C  Consult ordered by: Sobeida Diamond PA-C        Physician Requesting Evaluation: Lulú Griffin MD   Reason for Evaluation / Principal Problem: hematoma    Assessment & Plan  Infection of wound hematoma  Duplex study shows no acute or chronic DVT  CT of the right lower extremity shows subcutaneous fat stranding and skin thickening overlying the pretibial soft tissue with extension to the ankle  Previous Unicompartmental knee arthroplasty with Dr. Gray on 3/7/25  Overlying cellulitis overlying anterior shin on examination  WBAT RLE  Patient currently on IV cefepime and vanco  Will continue to follow for improvement or worsening of symptoms, possible I&D if worsening symptoms despite IV abx  Will continue to follow and monitor      History of Present Illness   HPI: Kaitlynn Madera is a 40 y.o. year old female who presents will erythema and pain overlying the anterior shin, states she was kicked in the shin by a horse on February 11th and noticed once the ecchymosis improved that she developed erythema and warmth. She was seen and was given PO abx as outpatient for a week without improvement and notices erythema increasing in size. Patient was seen by Dr. Gray yesterday in office who sent patient to ED for IV antibiotics, patient had unicompartmental knee replacement 3/7.    Review of Systems significant for findings described in the HPI.  Historical Information   Past Medical History:   Diagnosis Date    Anemia     hx of anemia - per pt was told with trying to donate blood    Anesthesia     2/12/24 per pt - reports with epidural use - pt experienced big BP drop    Anxiety     Blood type, Rh negative     Resolved 2/18/2016      COVID-19     9/30/24 per pt symptoms start 9/16- dx with COVID 9/19 - did see MD for MC 9/24 and cleared ( MD aware of COVID)    Kidney stone     Knee pain, left     Knee pain, right     Migraine     Obesity     PONV (postoperative nausea and vomiting)      Past Surgical History:   Procedure Laterality Date    EAR SURGERY      Last impression 8/5/2015     MYRINGOTOMY      [place] ARTHRP KNEE CONDYLE&PLATEAU MEDIAL/LAT CMPRT Right 3/7/2024    Procedure: ARTHROPLASTY KNEE UNICOMPARTMENTAL;  Surgeon: León Gray DO;  Location: [facility];  Service: Orthopedics    [place] ARTHRP KNEE CONDYLE&PLATEAU MEDIAL/LAT CMPRT Left 10/17/2024    Procedure: ARTHROPLASTY KNEE UNICOMPARTMENTAL;  Surgeon: León Gray DO;  Location: [facility];  Service: Orthopedics    TONSILLECTOMY      WISDOM TOOTH EXTRACTION       Social History     Tobacco Use    Smoking status: Never    Smokeless tobacco: Never    Tobacco comments:     Never a smoker or use of any tobacco products per pt    Vaping Use    Vaping status: Never Used   Substance and Sexual Activity    Alcohol use: Not Currently     Alcohol/week: 2.0 standard drinks of alcohol     Types: 2 Glasses of wine per week     Comment: Socially-- avg use couple times a month    Drug use: No     Comment: Denies any drug use per pt    Sexual activity: Yes     Partners: Male     Birth control/protection: I.U.D.     E-Cigarette/Vaping    E-Cigarette Use Never User     Comments Denies any use per pt      E-Cigarette/Vaping Substances    Nicotine No     THC No     CBD No     Flavoring No     Other No     Unknown No          Objective :  Temp:  [96.6 °F (35.9 °C)-98.8 °F (37.1 °C)] 96.6 °F (35.9 °C)  HR:  [] 75  BP: (160-199)/(103-121) 160/107  Resp:  [13-20] 16  SpO2:  [95 %-98 %] 95 %  O2 Device: None (Room air)  Physical ExamOrtho Exam   Musculoskeletal: Right lower extremity  Skin intact, erythema and excess warmth over the anterior shin  Motor intact anterior interosseous  "nerve/posterior interosseous nerve/median/radial/ulnar nerve distributions  Motor intact to +FHL/EHL, +ankle dorsi/plantar flexion  2+ DP pulse  No calf swelling or tenderness to palpation  Thigh and calf soft and compressible    Bilateral clavicles without step off. Bilateral shoulders, elbows, wrists and hands are without pain with range of motion, motor and sensation intact 2+ radial pulse    Bilateral hips, knees, ankle and feet are NTTP, motor and sensation intact.      Lab Results: I have reviewed the following results:   Recent Labs     03/10/25  1244 03/11/25  0459   WBC 8.33 7.24   HGB 14.3 13.5   HCT 43.2 42.0    309   BUN 16 11   CREATININE 0.76 0.62   PTT 27  --    INR 0.91  --    ESR 28*  --    CRP 32.6*  --      Blood Culture:   Lab Results   Component Value Date    BLOODCX Received in Microbiology Lab. Culture in Progress. 03/10/2025    BLOODCX Received in Microbiology Lab. Culture in Progress. 03/10/2025     Wound Culture: No results found for: \"WOUNDCULT\"      "

## 2025-03-12 LAB
ANION GAP SERPL CALCULATED.3IONS-SCNC: 7 MMOL/L (ref 4–13)
BASOPHILS # BLD AUTO: 0.07 THOUSANDS/ÂΜL (ref 0–0.1)
BASOPHILS NFR BLD AUTO: 1 % (ref 0–1)
BUN SERPL-MCNC: 13 MG/DL (ref 5–25)
CALCIUM SERPL-MCNC: 8.5 MG/DL (ref 8.4–10.2)
CHLORIDE SERPL-SCNC: 106 MMOL/L (ref 96–108)
CO2 SERPL-SCNC: 25 MMOL/L (ref 21–32)
CREAT SERPL-MCNC: 0.71 MG/DL (ref 0.6–1.3)
EOSINOPHIL # BLD AUTO: 0.41 THOUSAND/ÂΜL (ref 0–0.61)
EOSINOPHIL NFR BLD AUTO: 5 % (ref 0–6)
ERYTHROCYTE [DISTWIDTH] IN BLOOD BY AUTOMATED COUNT: 13.9 % (ref 11.6–15.1)
GFR SERPL CREATININE-BSD FRML MDRD: 105 ML/MIN/1.73SQ M
GLUCOSE SERPL-MCNC: 94 MG/DL (ref 65–140)
HCT VFR BLD AUTO: 40.6 % (ref 34.8–46.1)
HGB BLD-MCNC: 13.4 G/DL (ref 11.5–15.4)
IMM GRANULOCYTES # BLD AUTO: 0.03 THOUSAND/UL (ref 0–0.2)
IMM GRANULOCYTES NFR BLD AUTO: 0 % (ref 0–2)
LYMPHOCYTES # BLD AUTO: 1.73 THOUSANDS/ÂΜL (ref 0.6–4.47)
LYMPHOCYTES NFR BLD AUTO: 22 % (ref 14–44)
MCH RBC QN AUTO: 29.1 PG (ref 26.8–34.3)
MCHC RBC AUTO-ENTMCNC: 33 G/DL (ref 31.4–37.4)
MCV RBC AUTO: 88 FL (ref 82–98)
MONOCYTES # BLD AUTO: 0.52 THOUSAND/ÂΜL (ref 0.17–1.22)
MONOCYTES NFR BLD AUTO: 7 % (ref 4–12)
NEUTROPHILS # BLD AUTO: 5.01 THOUSANDS/ÂΜL (ref 1.85–7.62)
NEUTS SEG NFR BLD AUTO: 65 % (ref 43–75)
NRBC BLD AUTO-RTO: 0 /100 WBCS
PLATELET # BLD AUTO: 315 THOUSANDS/UL (ref 149–390)
PMV BLD AUTO: 9.2 FL (ref 8.9–12.7)
POTASSIUM SERPL-SCNC: 4.1 MMOL/L (ref 3.5–5.3)
RBC # BLD AUTO: 4.6 MILLION/UL (ref 3.81–5.12)
SODIUM SERPL-SCNC: 138 MMOL/L (ref 135–147)
VANCOMYCIN SERPL-MCNC: 9.3 UG/ML (ref 10–20)
WBC # BLD AUTO: 7.77 THOUSAND/UL (ref 4.31–10.16)

## 2025-03-12 PROCEDURE — 80202 ASSAY OF VANCOMYCIN: CPT | Performed by: INTERNAL MEDICINE

## 2025-03-12 PROCEDURE — 85025 COMPLETE CBC W/AUTO DIFF WBC: CPT | Performed by: INTERNAL MEDICINE

## 2025-03-12 PROCEDURE — 80048 BASIC METABOLIC PNL TOTAL CA: CPT | Performed by: INTERNAL MEDICINE

## 2025-03-12 PROCEDURE — 99232 SBSQ HOSP IP/OBS MODERATE 35: CPT | Performed by: PHYSICIAN ASSISTANT

## 2025-03-12 PROCEDURE — 99232 SBSQ HOSP IP/OBS MODERATE 35: CPT | Performed by: STUDENT IN AN ORGANIZED HEALTH CARE EDUCATION/TRAINING PROGRAM

## 2025-03-12 RX ORDER — LISINOPRIL 10 MG/1
10 TABLET ORAL DAILY
Status: DISCONTINUED | OUTPATIENT
Start: 2025-03-12 | End: 2025-03-13

## 2025-03-12 RX ORDER — HYDRALAZINE HYDROCHLORIDE 20 MG/ML
5 INJECTION INTRAMUSCULAR; INTRAVENOUS EVERY 6 HOURS PRN
Status: DISCONTINUED | OUTPATIENT
Start: 2025-03-12 | End: 2025-03-13 | Stop reason: HOSPADM

## 2025-03-12 RX ADMIN — ACETAMINOPHEN 650 MG: 325 TABLET, FILM COATED ORAL at 12:14

## 2025-03-12 RX ADMIN — CEFEPIME HYDROCHLORIDE 2000 MG: 2 INJECTION, SOLUTION INTRAVENOUS at 00:13

## 2025-03-12 RX ADMIN — CEFEPIME HYDROCHLORIDE 2000 MG: 2 INJECTION, SOLUTION INTRAVENOUS at 12:17

## 2025-03-12 RX ADMIN — HYDRALAZINE HYDROCHLORIDE 5 MG: 20 INJECTION INTRAMUSCULAR; INTRAVENOUS at 14:21

## 2025-03-12 RX ADMIN — LISINOPRIL 10 MG: 10 TABLET ORAL at 09:01

## 2025-03-12 RX ADMIN — VENLAFAXINE HYDROCHLORIDE 150 MG: 150 CAPSULE, EXTENDED RELEASE ORAL at 07:40

## 2025-03-12 RX ADMIN — HEPARIN SODIUM 5000 UNITS: 5000 INJECTION, SOLUTION INTRAVENOUS; SUBCUTANEOUS at 13:22

## 2025-03-12 RX ADMIN — VANCOMYCIN HYDROCHLORIDE 1000 MG: 1 INJECTION, SOLUTION INTRAVENOUS at 05:36

## 2025-03-12 RX ADMIN — VANCOMYCIN HYDROCHLORIDE 1000 MG: 1 INJECTION, SOLUTION INTRAVENOUS at 17:42

## 2025-03-12 NOTE — PROGRESS NOTES
Progress Note - Orthopedics   Name: Kaitlynn Madera 40 y.o. female I MRN: [id number]  Unit/Bed#: -01 I Date of Admission: 3/10/2025   Date of Service: 3/12/2025 I Hospital Day: 2     Assessment & Plan  Infection of wound hematoma  -RLE hematoma developed after being kicked by a horse over 1 month ago.  Symptoms of infected hematoma and cellulitis.  -History of Unicompartmental knee arthroplasty with Dr. Gray on 3/7/25  -CT of the right lower extremity shows subcutaneous fat stranding and skin thickening overlying the pretibial soft tissue with extension to the ankle  - Symptoms are improving on IV antibiotics, continue with IV cefepime and vanco for another 24 hours, then may d/c on oral antibiotics as long as symptoms continue to improve.  - WBAT RLE  - Will continue to follow and monitor    Orthopedics service will follow.  Please contact the SecureChat role for the Orthopedics service with any questions/concerns.    Subjective   40 y.o.female seen and examined at the bedside this morning.  No acute events, no new complaints. Pain well controlled, she feels that her symptoms are improving since starting IV antibiotics. Denies fevers, chills, CP, SOB, N/V, numbness or tingling. Patient reports no issues with urination or bowel movements.    Objective :  HR:  [74-98] 74  BP: (138-156)/(94) 138/94  Resp:  [16] 16  SpO2:  [91 %-97 %] 91 %  O2 Device: None (Room air)    Physical Exam  Musculoskeletal: Right lower extremity  Skin intact, erythema and warmth over the anterior shin  No palpable fluctuance on exam today  Motor intact anterior interosseous nerve/posterior interosseous nerve/median/radial/ulnar nerve distributions  Motor intact to +FHL/EHL, +ankle dorsi/plantar flexion  2+ DP pulse  No calf swelling or tenderness to palpation  Thigh and calf soft and compressible      Lab Results: I have reviewed the following results:  Recent Labs     03/10/25  1244 03/11/25  0459 03/12/25  0404   WBC 8.33 7.24  "7.77   HGB 14.3 13.5 13.4   HCT 43.2 42.0 40.6    309 315   BUN 16 11 13   CREATININE 0.76 0.62 0.71   PTT 27  --   --    INR 0.91  --   --    ESR 28*  --   --    CRP 32.6*  --   --      Blood Culture:    Lab Results   Component Value Date    BLOODCX No Growth at 24 hrs. 03/10/2025    BLOODCX No Growth at 24 hrs. 03/10/2025     Wound Culture: No results found for: \"WOUNDCULT\"    Imaging Results Review: No pertinent imaging studies reviewed.  Other Study Results Review: No additional pertinent studies reviewed.      Emi García PA-C    "

## 2025-03-12 NOTE — PROGRESS NOTES
Kaitlynn Madera is a 40 y.o. female who is currently ordered Vancomycin IV with management by the Pharmacy Consult service.  Relevant clinical data and objective / subjective history reviewed.  Vancomycin Assessment:  Indication and Goal AUC/Trough: Soft tissue (goal -600, trough >10), -600, trough >10  Clinical Status: stable  Micro:     Renal Function:  SCr: 0.71 mg/dL  CrCl: >100 mL/min  Renal replacement: Not on dialysis  Days of Therapy: 3  Current Dose: 1000 mg Q12h  Vancomycin Plan:  New Dosing: no change  Estimated AUC: 413 mcg*hr/mL  Estimated Trough: 11.2 mcg/mL  Next Level: 0600 on 3/19  Renal Function Monitoring: Daily BMP and UOP  Pharmacy will continue to follow closely for s/sx of nephrotoxicity, infusion reactions and appropriateness of therapy.  BMP and CBC will be ordered per protocol. We will continue to follow the patient’s culture results and clinical progress daily.    Urbano Bueno, Pharmacist

## 2025-03-12 NOTE — ASSESSMENT & PLAN NOTE
Patient was kicked by horse anterior right shin 2/11/2025 initially with bruising which then faded however she noticed new erythema and tenderness about 1 week ago.  Placed on Keflex by urgent care 3/4. No improvement, saw outpatient ortho 3/10 who recommended ED evaluation.  Not meeting SIRS criteria  CT right lower extremity: 6.6 cm collection in the pretibial soft tissues of the anteromedial mid calf, likely hematoma. Superinfection cannot be excluded by imaging. No soft tissue gas. Mild circumferential subcutaneous fat stranding and skin thickening extending from just below the knee through the ankle, which can be seen with cellulitis.  ESR 28  CRP: 32.6  ABX: IV cefepime/vancomycin, continue for now given clinical improvement (day 3)  Orthopedic surgery consulted  Continue conservative tx with IV abx  If worsening or failure to improve will consider I&D

## 2025-03-12 NOTE — ASSESSMENT & PLAN NOTE
-RLE hematoma developed after being kicked by a horse over 1 month ago.  Symptoms of infected hematoma and cellulitis.  -History of Unicompartmental knee arthroplasty with Dr. Gray on 3/7/25  -CT of the right lower extremity shows subcutaneous fat stranding and skin thickening overlying the pretibial soft tissue with extension to the ankle  - Symptoms are improving on IV antibiotics, continue with IV cefepime and vanco for another 24 hours, then may d/c on oral antibiotics as long as symptoms continue to improve.  - WBAT RLE  - Will continue to follow and monitor

## 2025-03-12 NOTE — APP STUDENT NOTE
[facility] STUDENT  Inpatient Progress Note for TRAINING ONLY  Not Part of Legal Medical Record     Progress Note - Kaitlynn Madera 40 y.o. female MRN: [id number]  Unit/Bed#: -01 Encounter: [id number]    Assessment:    Principal Problem:  1. Infection of wound hematoma  Active Problems:  2. Migraine without aura and without status migrainosus, not intractable  3. Elevated blood pressure reading  4. Anxiety      Plan:    Patient was kicked by a horse in her anterior right shin 2/11/2025, initially was bruised that faded, but new redness and tenderness started 1 week ago. Placed on Keflex by urgent care 3/4 with no improvement. Saw outpatient ortho 3/10 who recommended ED evaluation.  Not meeting SIRS criteria.  CT right lower extremity shows 6.6 cm collection in pretibial soft tissues of anteromedial mid calf, likely hematoma. Superinfection can not be excluded from imaging. No soft tissue gas. Mild circumferential subcutaneous fat stranding and skin thickening extending from just below the knee through the ankle, seen with cellulitis.  ESR 28  CRP 32.6  ABX: IV cefepime/vancomycin. Continue for now (day 2).  Orthopedic surgery consulted  Continue conservative treatment w/ IV ABX  If worsening or failure to improve, consider I&D  Elevated Blood Pressure Reading  -190 on admission.  Patient not on anti-hypertensives at baseline.  Suspect anxiety/white coat syndrome contributing --> outpatient visits show   Patient reports similar Bps during previous admissions, was placed on medications that caused hypotension.  PRN labetalol  Hold off on anti-hypertensive medications  Anxiety  Continue on Effexor and home Xanax PRN anxiety  Migraine w/o aura and w/o status migrainosus, not intractable  Hx of migraines  3/11: mild HA consistent with chronic pattern --> migraine cocktail and Mg alleviated pain  3/12: Patient denies headache today.      VTE Pharmacologic Prophylaxis:   Pharmacologic: {VTE Prophylaxis  Meds:37671}  Mechanical VTE Prophylaxis in Place: {Yes or No:39572}    Patient Centered Rounds: {Patient Centered Rounds:44627}    Discussions with Specialists or Other Care Team Provider: ***    Education and Discussions with Family / Patient: ***    Time Spent for Care: {Time; Time 15 min - 1 hour:47078}.  More than 50% of total time spent on counseling and coordination of care as described above.    Current Length of Stay: 2 day(s)    Current Patient Status: Inpatient   Certification Statement: {Certification Statement:28093}    Discharge Plan: Continue to monitor status while taking IV ABX. Patient currently does not meet SIRS criteria.    Code Status: Level 1 - Full Code    Subjective:   EMORY is a 40-y.o. female on hospital day 2 admitted for infected hematoma after being kicked by a horse 1 month ago with erythema and swelling that started 1 week ago presenting for evaluation. Patient denies any issues overnight, stating her headache from yesterday a.m. has resolved. She states he leg pain and redness and improved since admission. She denies numbness, tingling, chest pain, shortness of breath, pain, fevers, chills. Patient denies urinary or bowel issues, with her last BM yesterday evening. Patient's appetite and drinking have been good. Patient states she feels she is improving.    Objective:     Vitals:   Temp (24hrs), Av.9 °F (36.6 °C), Min:97.9 °F (36.6 °C), Max:97.9 °F (36.6 °C)    Temp:  [97.9 °F (36.6 °C)] 97.9 °F (36.6 °C)  HR:  [74-98] 82  Resp:  [16-20] 20  BP: (138-178)/() 169/118  SpO2:  [91 %-97 %] 94 %  Body mass index is 40.02 kg/m².     Input and Output Summary (last 24 hours):       Intake/Output Summary (Last 24 hours) at 3/12/2025 1001  Last data filed at 3/12/2025 0101  Gross per 24 hour   Intake 580 ml   Output --   Net 580 ml       Physical Exam:     Physical Exam  Constitutional:       General: She is not in acute distress.     Appearance: She is obese.   HENT:      Head:  Normocephalic and atraumatic.      Right Ear: External ear normal.      Left Ear: External ear normal.      Nose: Nose normal.   Eyes:      Conjunctiva/sclera: Conjunctivae normal.   Cardiovascular:      Rate and Rhythm: Normal rate and regular rhythm.      Pulses: Normal pulses.      Heart sounds: Normal heart sounds. No murmur heard.     No friction rub. No gallop.   Pulmonary:      Effort: Pulmonary effort is normal. No respiratory distress.      Breath sounds: Normal breath sounds. No wheezing, rhonchi or rales.   Abdominal:      General: Abdomen is flat.   Musculoskeletal:         General: Tenderness (Mild R lower calf) present. Normal range of motion.      Cervical back: Normal range of motion.      Right lower leg: No edema.      Left lower leg: No edema.   Skin:     General: Skin is warm and dry.      Capillary Refill: Capillary refill takes less than 2 seconds.      Findings: Bruising (R anteromedial tibia) present.   Neurological:      General: No focal deficit present.      Mental Status: She is alert.   Psychiatric:         Mood and Affect: Mood normal.       Historical Information   Past Medical History:   Diagnosis Date    Anemia     hx of anemia - per pt was told with trying to donate blood    Anesthesia     2/12/24 per pt - reports with epidural use - pt experienced big BP drop    Anxiety     Blood type, Rh negative     Resolved 2/18/2016     COVID-19     9/30/24 per pt symptoms start 9/16- dx with COVID 9/19 - did see MD for MC 9/24 and cleared ( MD aware of COVID)    Kidney stone     Knee pain, left     Knee pain, right     Migraine     Obesity     PONV (postoperative nausea and vomiting)      Past Surgical History:   Procedure Laterality Date    EAR SURGERY      Last impression 8/5/2015     MYRINGOTOMY      [place] ARTHRP KNEE CONDYLE&PLATEAU MEDIAL/LAT CMPRT Right 3/7/2024    Procedure: ARTHROPLASTY KNEE UNICOMPARTMENTAL;  Surgeon: León Gray DO;  Location: [facility];  Service: Orthopedics     [place] ARTHRP KNEE CONDYLE&PLATEAU MEDIAL/LAT CMPRT Left 10/17/2024    Procedure: ARTHROPLASTY KNEE UNICOMPARTMENTAL;  Surgeon: León Gray DO;  Location: [facility];  Service: Orthopedics    TONSILLECTOMY      WISDOM TOOTH EXTRACTION       Social History   Social History     Substance and Sexual Activity   Alcohol Use Not Currently    Alcohol/week: 2.0 standard drinks of alcohol    Types: 2 Glasses of wine per week    Comment: Socially-- avg use couple times a month     Social History     Substance and Sexual Activity   Drug Use No    Comment: Denies any drug use per pt     Social History     Tobacco Use   Smoking Status Never   Smokeless Tobacco Never   Tobacco Comments    Never a smoker or use of any tobacco products per pt      Family History: Family history non-contributory    Meds/Allergies   all medications and allergies reviewed  Allergies   Allergen Reactions    Bactrim [Sulfamethoxazole-Trimethoprim] Anaphylaxis and Edema     Annotation - [id number]: painful to swallow       Additional Data:     Labs:    Results from last 7 days   Lab Units 03/12/25  0404   WBC Thousand/uL 7.77   HEMOGLOBIN g/dL 13.4   HEMATOCRIT % 40.6   PLATELETS Thousands/uL 315   SEGS PCT % 65   LYMPHO PCT % 22   MONO PCT % 7   EOS PCT % 5     Results from last 7 days   Lab Units 03/12/25  0404 03/11/25  0459 03/10/25  1244   SODIUM mmol/L 138   < > 137   POTASSIUM mmol/L 4.1   < > 4.1   CHLORIDE mmol/L 106   < > 103   CO2 mmol/L 25   < > 29   BUN mg/dL 13   < > 16   CREATININE mg/dL 0.71   < > 0.76   ANION GAP mmol/L 7   < > 5   CALCIUM mg/dL 8.5   < > 9.5   ALBUMIN g/dL  --   --  4.3   TOTAL BILIRUBIN mg/dL  --   --  0.29   ALK PHOS U/L  --   --  53   ALT U/L  --   --  11   AST U/L  --   --  11*   GLUCOSE RANDOM mg/dL 94   < > 74    < > = values in this interval not displayed.     Results from last 7 days   Lab Units 03/10/25  1244   INR  0.91             Results from last 7 days   Lab Units 03/10/25  1244   LACTIC ACID  mmol/L 0.7   PROCALCITONIN ng/ml <0.05         * I Have Reviewed All Lab Data Listed Above.  * Additional Pertinent Lab Tests Reviewed: All Labs Within Last 24 Hours Reviewed    Imaging:    Imaging Reports Reviewed Today Include: None  Imaging Personally Reviewed by Myself Includes:  None    Recent Cultures (last 7 days):     Results from last 7 days   Lab Units 03/10/25  1244   BLOOD CULTURE  No Growth at 24 hrs.  No Growth at 24 hrs.       Last 24 Hours Medication List:   Current Facility-Administered Medications   Medication Dose Route Frequency Provider Last Rate    acetaminophen  650 mg Oral Q6H PRN Sobeiad Diamond PA-C      ALPRAZolam  0.25 mg Oral Daily PRN SHANDA Hensely-C      cefepime  2,000 mg Intravenous Q12H SHANDA Hensley-C 2,000 mg (03/12/25 0013)    heparin (porcine)  5,000 Units Subcutaneous Q8H NATE SHANDA Hensley-MOOKIE      ibuprofen  400 mg Oral Q6H PRN Sobeida Diamond PA-C      labetalol  10 mg Intravenous Q12H PRN Sobeida Diamond PA-C      lisinopril  10 mg Oral Daily Rosy Phelps PA-C      vancomycin  15 mg/kg (Adjusted) Intravenous Q12H SANDI HensleyC 1,000 mg (03/12/25 0536)    venlafaxine  150 mg Oral Daily With Breakfast Sobeida Diamond PA-C          Today, Patient Was Seen By: Marco Antonio Giles    ** Please Note: Dictation voice to text software may have been used in the creation of this document. **

## 2025-03-12 NOTE — PLAN OF CARE
Problem: PAIN - ADULT  Goal: Verbalizes/displays adequate comfort level or baseline comfort level  Description: Interventions:  - Encourage patient to monitor pain and request assistance  - Assess pain using appropriate pain scale  - Administer analgesics based on type and severity of pain and evaluate response  - Implement non-pharmacological measures as appropriate and evaluate response  - Consider cultural and social influences on pain and pain management  - Notify physician/advanced practitioner if interventions unsuccessful or patient reports new pain  Outcome: Progressing     Problem: INFECTION - ADULT  Goal: Absence or prevention of progression during hospitalization  Description: INTERVENTIONS:  - Assess and monitor for signs and symptoms of infection  - Monitor lab/diagnostic results  - Monitor all insertion sites, i.e. indwelling lines, tubes, and drains  - Monitor endotracheal if appropriate and nasal secretions for changes in amount and color  - [place] appropriate cooling/warming therapies per order  - Administer medications as ordered  - Instruct and encourage patient and family to use good hand hygiene technique  - Identify and instruct in appropriate isolation precautions for identified infection/condition  Outcome: Progressing  Goal: Absence of fever/infection during neutropenic period  Description: INTERVENTIONS:  - Monitor WBC    Outcome: Progressing     Problem: SAFETY ADULT  Goal: Patient will remain free of falls  Description: INTERVENTIONS:  - Educate patient/family on patient safety including physical limitations  - Instruct patient to call for assistance with activity   - Consult OT/PT to assist with strengthening/mobility   - Keep Call bell within reach  - Keep bed low and locked with side rails adjusted as appropriate  - Keep care items and personal belongings within reach  - Initiate and maintain comfort rounds  - Make Fall Risk Sign visible to staff  - Offer Toileting every  Hours,  in advance of need  - Initiate/Maintain alarm  - Obtain necessary fall risk management equipment:   - Apply yellow socks and bracelet for high fall risk patients  - Consider moving patient to room near nurses station  Outcome: Progressing  Goal: Maintain or return to baseline ADL function  Description: INTERVENTIONS:  -  Assess patient's ability to carry out ADLs; assess patient's baseline for ADL function and identify physical deficits which impact ability to perform ADLs (bathing, care of mouth/teeth, toileting, grooming, dressing, etc.)  - Assess/evaluate cause of self-care deficits   - Assess range of motion  - Assess patient's mobility; develop plan if impaired  - Assess patient's need for assistive devices and provide as appropriate  - Encourage maximum independence but intervene and supervise when necessary  - Involve family in performance of ADLs  - Assess for home care needs following discharge   - Consider OT consult to assist with ADL evaluation and planning for discharge  - Provide patient education as appropriate  Outcome: Progressing  Goal: Maintains/Returns to pre admission functional level  Description: INTERVENTIONS:  - Perform AM-PAC 6 Click Basic Mobility/ Daily Activity assessment daily.  - Set and communicate daily mobility goal to care team and patient/family/caregiver.   - Collaborate with rehabilitation services on mobility goals if consulted  - Perform Range of Motion times a day.  - Reposition patient every  hours.  - Dangle patient  times a day  - Stand patient  times a day  - Ambulate patient  times a day  - Out of bed to chair  times a day   - Out of bed for meal times a day  - Out of bed for toileting  - Record patient progress and toleration of activity level   Outcome: Progressing     Problem: DISCHARGE PLANNING  Goal: Discharge to home or other facility with appropriate resources  Description: INTERVENTIONS:  - Identify barriers to discharge w/patient and caregiver  - Arrange for  needed discharge resources and transportation as appropriate  - Identify discharge learning needs (meds, wound care, etc.)  - Arrange for interpretive services to assist at discharge as needed  - Refer to Case Management Department for coordinating discharge planning if the patient needs post-hospital services based on physician/advanced practitioner order or complex needs related to functional status, cognitive ability, or social support system  Outcome: Progressing     Problem: Knowledge Deficit  Goal: Patient/family/caregiver demonstrates understanding of disease process, treatment plan, medications, and discharge instructions  Description: Complete learning assessment and assess knowledge base.  Interventions:  - Provide teaching at level of understanding  - Provide teaching via preferred learning methods  Outcome: Progressing

## 2025-03-12 NOTE — ASSESSMENT & PLAN NOTE
-190 on admission   Patient is not on anti-hypertensives at baseline   Persistent HTN despite adequate control of pain and anxiety - start lisinopril 10 mg daily 3/12  PRN labetalol

## 2025-03-12 NOTE — PLAN OF CARE
Problem: PAIN - ADULT  Goal: Verbalizes/displays adequate comfort level or baseline comfort level  Description: Interventions:  - Encourage patient to monitor pain and request assistance  - Assess pain using appropriate pain scale  - Administer analgesics based on type and severity of pain and evaluate response  - Implement non-pharmacological measures as appropriate and evaluate response  - Consider cultural and social influences on pain and pain management  - Notify physician/advanced practitioner if interventions unsuccessful or patient reports new pain  Outcome: Progressing     Problem: INFECTION - ADULT  Goal: Absence or prevention of progression during hospitalization  Description: INTERVENTIONS:  - Assess and monitor for signs and symptoms of infection  - Monitor lab/diagnostic results  - Monitor all insertion sites, i.e. indwelling lines, tubes, and drains  - Monitor endotracheal if appropriate and nasal secretions for changes in amount and color  - [place] appropriate cooling/warming therapies per order  - Administer medications as ordered  - Instruct and encourage patient and family to use good hand hygiene technique  - Identify and instruct in appropriate isolation precautions for identified infection/condition  Outcome: Progressing  Goal: Absence of fever/infection during neutropenic period  Description: INTERVENTIONS:  - Monitor WBC    Outcome: Progressing     Problem: SAFETY ADULT  Goal: Patient will remain free of falls  Description: INTERVENTIONS:  - Educate patient/family on patient safety including physical limitations  - Instruct patient to call for assistance with activity   - Consult OT/PT to assist with strengthening/mobility   - Keep Call bell within reach  - Keep bed low and locked with side rails adjusted as appropriate  - Keep care items and personal belongings within reach  - Initiate and maintain comfort rounds  - Make Fall Risk Sign visible to staff  - Offer Toileting every 2 Hours,  in advance of need  - Initiate/Maintain alarm  - Obtain necessary fall risk management equipment:   - Apply yellow socks and bracelet for high fall risk patients  - Consider moving patient to room near nurses station  Outcome: Progressing  Goal: Maintain or return to baseline ADL function  Description: INTERVENTIONS:  -  Assess patient's ability to carry out ADLs; assess patient's baseline for ADL function and identify physical deficits which impact ability to perform ADLs (bathing, care of mouth/teeth, toileting, grooming, dressing, etc.)  - Assess/evaluate cause of self-care deficits   - Assess range of motion  - Assess patient's mobility; develop plan if impaired  - Assess patient's need for assistive devices and provide as appropriate  - Encourage maximum independence but intervene and supervise when necessary  - Involve family in performance of ADLs  - Assess for home care needs following discharge   - Consider OT consult to assist with ADL evaluation and planning for discharge  - Provide patient education as appropriate  Outcome: Progressing  Goal: Maintains/Returns to pre admission functional level  Description: INTERVENTIONS:  - Perform AM-PAC 6 Click Basic Mobility/ Daily Activity assessment daily.  - Set and communicate daily mobility goal to care team and patient/family/caregiver.   - Collaborate with rehabilitation services on mobility goals if consulted  - Perform Range of Motion 4 times a day.  - Reposition patient every 2 hours.  - Dangle patient 3 times a day  - Stand patient 3 times a day  - Ambulate patient 3 times a day  - Out of bed to chair 3 times a day   - Out of bed for meals 3 times a day  - Out of bed for toileting  - Record patient progress and toleration of activity level   Outcome: Progressing     Problem: DISCHARGE PLANNING  Goal: Discharge to home or other facility with appropriate resources  Description: INTERVENTIONS:  - Identify barriers to discharge w/patient and caregiver  -  Arrange for needed discharge resources and transportation as appropriate  - Identify discharge learning needs (meds, wound care, etc.)  - Arrange for interpretive services to assist at discharge as needed  - Refer to Case Management Department for coordinating discharge planning if the patient needs post-hospital services based on physician/advanced practitioner order or complex needs related to functional status, cognitive ability, or social support system  Outcome: Progressing     Problem: Knowledge Deficit  Goal: Patient/family/caregiver demonstrates understanding of disease process, treatment plan, medications, and discharge instructions  Description: Complete learning assessment and assess knowledge base.  Interventions:  - Provide teaching at level of understanding  - Provide teaching via preferred learning methods  Outcome: Progressing

## 2025-03-12 NOTE — PROGRESS NOTES
Progress Note - Hospitalist   Name: Kaitlynn Madera 40 y.o. female I MRN: [id number]  Unit/Bed#: [facility] I Date of Admission: 3/10/2025   Date of Service: 3/12/2025 I Hospital Day: 2    Assessment & Plan  Infection of wound hematoma  Patient was kicked by horse anterior right shin 2/11/2025 initially with bruising which then faded however she noticed new erythema and tenderness about 1 week ago.  Placed on Keflex by urgent care 3/4. No improvement, saw outpatient ortho 3/10 who recommended ED evaluation.  Not meeting SIRS criteria  CT right lower extremity: 6.6 cm collection in the pretibial soft tissues of the anteromedial mid calf, likely hematoma. Superinfection cannot be excluded by imaging. No soft tissue gas. Mild circumferential subcutaneous fat stranding and skin thickening extending from just below the knee through the ankle, which can be seen with cellulitis.  ESR 28  CRP: 32.6  ABX: IV cefepime/vancomycin, continue for now given clinical improvement (day 3)  Orthopedic surgery consulted  Continue conservative tx with IV abx  If worsening or failure to improve will consider I&D  Elevated blood pressure reading  -190 on admission   Patient is not on anti-hypertensives at baseline   Persistent HTN despite adequate control of pain and anxiety - start lisinopril 10 mg daily 3/12  PRN labetalol   Anxiety  Continue Effexor  Continue home xanax PRN patient reports last taking about 2 weeks ago  Migraine without aura and without status migrainosus, not intractable  Hx of migraines   Treated with migraine cocktail yesterday, now resolved    VTE Pharmacologic Prophylaxis: VTE Score: 3 Moderate Risk (Score 3-4) - Pharmacological DVT Prophylaxis Ordered: heparin.    Mobility:   Basic Mobility Inpatient Raw Score: 24  JH-HLM Goal: 8: Walk 250 feet or more  JH-HLM Achieved: 8: Walk 250 feet ot more  JH-HLM Goal achieved. Continue to encourage appropriate mobility.    Patient Centered Rounds: I performed  bedside rounds with nursing staff today.   Discussions with Specialists or Other Care Team Provider: zenobia JIMÉNEZ ortho input    Education and Discussions with Family / Patient: Patient declined call to .     Current Length of Stay: 2 day(s)  Current Patient Status: Inpatient   Certification Statement: The patient will continue to require additional inpatient hospital stay due to IV abx, ortho input  Discharge Plan: Anticipate discharge in 24-48 hrs to home.    Code Status: Level 1 - Full Code    Subjective   Feeling well today.  Believes swelling and pain is improving.  Denies chest pain/palpitations, shortness of breath, nausea/vomiting, abdominal pain, fever/chills.    Objective :  Temp:  [97.9 °F (36.6 °C)] 97.9 °F (36.6 °C)  HR:  [74-98] 82  BP: (138-178)/() 169/118  Resp:  [16-20] 20  SpO2:  [91 %-97 %] 94 %  O2 Device: None (Room air)    Body mass index is 40.02 kg/m².     Input and Output Summary (last 24 hours):     Intake/Output Summary (Last 24 hours) at 3/12/2025 0907  Last data filed at 3/12/2025 0101  Gross per 24 hour   Intake 580 ml   Output --   Net 580 ml       Physical Exam  Vitals and nursing note reviewed.   Constitutional:       Appearance: Normal appearance.      Comments: No acute distress   HENT:      Head: Normocephalic.   Eyes:      General: No scleral icterus.     Extraocular Movements: Extraocular movements intact.      Conjunctiva/sclera: Conjunctivae normal.   Cardiovascular:      Rate and Rhythm: Normal rate and regular rhythm.   Pulmonary:      Effort: Pulmonary effort is normal.      Breath sounds: Normal breath sounds. No wheezing, rhonchi or rales.   Abdominal:      General: Bowel sounds are normal.      Palpations: Abdomen is soft.      Tenderness: There is no abdominal tenderness. There is no guarding or rebound.   Musculoskeletal:         General: No swelling, tenderness or deformity.      Cervical back: Normal range of motion.      Comments: Able to move  upper/lower ext bilaterally.    Skin:     General: Skin is warm and dry.      Findings: Erythema present.      Comments: Swelling and erythema R anterior lower leg   Neurological:      Mental Status: She is alert and oriented to person, place, and time.   Psychiatric:         Mood and Affect: Mood normal.         Speech: Speech normal.         Behavior: Behavior normal.           Lines/Drains:              Lab Results: I have reviewed the following results:   Results from last 7 days   Lab Units 03/12/25  0404   WBC Thousand/uL 7.77   HEMOGLOBIN g/dL 13.4   HEMATOCRIT % 40.6   PLATELETS Thousands/uL 315   SEGS PCT % 65   LYMPHO PCT % 22   MONO PCT % 7   EOS PCT % 5     Results from last 7 days   Lab Units 03/12/25  0404 03/11/25  0459 03/10/25  1244   SODIUM mmol/L 138   < > 137   POTASSIUM mmol/L 4.1   < > 4.1   CHLORIDE mmol/L 106   < > 103   CO2 mmol/L 25   < > 29   BUN mg/dL 13   < > 16   CREATININE mg/dL 0.71   < > 0.76   ANION GAP mmol/L 7   < > 5   CALCIUM mg/dL 8.5   < > 9.5   ALBUMIN g/dL  --   --  4.3   TOTAL BILIRUBIN mg/dL  --   --  0.29   ALK PHOS U/L  --   --  53   ALT U/L  --   --  11   AST U/L  --   --  11*   GLUCOSE RANDOM mg/dL 94   < > 74    < > = values in this interval not displayed.     Results from last 7 days   Lab Units 03/10/25  1244   INR  0.91             Results from last 7 days   Lab Units 03/10/25  1244   LACTIC ACID mmol/L 0.7   PROCALCITONIN ng/ml <0.05       Recent Cultures (last 7 days):   Results from last 7 days   Lab Units 03/10/25  1244   BLOOD CULTURE  No Growth at 24 hrs.  No Growth at 24 hrs.       Imaging Results Review: I reviewed radiology reports from this admission including: CT RLE and Ultrasound(s).  Other Study Results Review: No additional pertinent studies reviewed.    Last 24 Hours Medication List:     Current Facility-Administered Medications:     acetaminophen (TYLENOL) tablet 650 mg, Q6H PRN    ALPRAZolam (XANAX) tablet 0.25 mg, Daily PRN    cefepime  (MAXIPIME) IVPB (premix in dextrose) 2,000 mg 50 mL, Q12H, Last Rate: 2,000 mg (03/12/25 0013)    heparin (porcine) subcutaneous injection 5,000 Units, Q8H NATE    ibuprofen (MOTRIN) tablet 400 mg, Q6H PRN    labetalol (NORMODYNE) injection 10 mg, Q12H PRN    lisinopril (ZESTRIL) tablet 10 mg, Daily    vancomycin (VANCOCIN) IVPB (premix in dextrose) 1,000 mg 200 mL, Q12H, Last Rate: 1,000 mg (03/12/25 0536)    venlafaxine (EFFEXOR-XR) 24 hr capsule 150 mg, Daily With Breakfast    Administrative Statements   Today, Patient Was Seen By: Rosy Phelps PA-C  I have spent a total time of 35 minutes in caring for this patient on the day of the visit/encounter including Diagnostic results, Instructions for management, Patient and family education, Importance of tx compliance, Risk factor reductions, Impressions, Counseling / Coordination of care, Documenting in the medical record, Reviewing/placing orders in the medical record (including tests, medications, and/or procedures), Obtaining or reviewing history  , and Communicating with other healthcare professionals .    **Please Note: This note may have been constructed using a voice recognition system.**

## 2025-03-13 ENCOUNTER — TRANSITIONAL CARE MANAGEMENT (OUTPATIENT)
Dept: FAMILY MEDICINE CLINIC | Facility: CLINIC | Age: 40
End: 2025-03-13

## 2025-03-13 VITALS
HEIGHT: 62 IN | HEART RATE: 73 BPM | DIASTOLIC BLOOD PRESSURE: 100 MMHG | OXYGEN SATURATION: 95 % | TEMPERATURE: 98.2 F | BODY MASS INDEX: 40.02 KG/M2 | SYSTOLIC BLOOD PRESSURE: 161 MMHG | RESPIRATION RATE: 16 BRPM

## 2025-03-13 LAB
ANION GAP SERPL CALCULATED.3IONS-SCNC: 6 MMOL/L (ref 4–13)
BASOPHILS # BLD AUTO: 0.07 THOUSANDS/ÂΜL (ref 0–0.1)
BASOPHILS NFR BLD AUTO: 1 % (ref 0–1)
BUN SERPL-MCNC: 15 MG/DL (ref 5–25)
CALCIUM SERPL-MCNC: 8.3 MG/DL (ref 8.4–10.2)
CHLORIDE SERPL-SCNC: 104 MMOL/L (ref 96–108)
CO2 SERPL-SCNC: 27 MMOL/L (ref 21–32)
CREAT SERPL-MCNC: 0.66 MG/DL (ref 0.6–1.3)
EOSINOPHIL # BLD AUTO: 0.38 THOUSAND/ÂΜL (ref 0–0.61)
EOSINOPHIL NFR BLD AUTO: 5 % (ref 0–6)
ERYTHROCYTE [DISTWIDTH] IN BLOOD BY AUTOMATED COUNT: 13.8 % (ref 11.6–15.1)
GFR SERPL CREATININE-BSD FRML MDRD: 109 ML/MIN/1.73SQ M
GLUCOSE SERPL-MCNC: 103 MG/DL (ref 65–140)
HCT VFR BLD AUTO: 40.1 % (ref 34.8–46.1)
HGB BLD-MCNC: 13.1 G/DL (ref 11.5–15.4)
IMM GRANULOCYTES # BLD AUTO: 0.07 THOUSAND/UL (ref 0–0.2)
IMM GRANULOCYTES NFR BLD AUTO: 1 % (ref 0–2)
LYMPHOCYTES # BLD AUTO: 1.85 THOUSANDS/ÂΜL (ref 0.6–4.47)
LYMPHOCYTES NFR BLD AUTO: 23 % (ref 14–44)
MCH RBC QN AUTO: 28.9 PG (ref 26.8–34.3)
MCHC RBC AUTO-ENTMCNC: 32.7 G/DL (ref 31.4–37.4)
MCV RBC AUTO: 88 FL (ref 82–98)
MONOCYTES # BLD AUTO: 0.57 THOUSAND/ÂΜL (ref 0.17–1.22)
MONOCYTES NFR BLD AUTO: 7 % (ref 4–12)
NEUTROPHILS # BLD AUTO: 5.24 THOUSANDS/ÂΜL (ref 1.85–7.62)
NEUTS SEG NFR BLD AUTO: 63 % (ref 43–75)
NRBC BLD AUTO-RTO: 0 /100 WBCS
PLATELET # BLD AUTO: 330 THOUSANDS/UL (ref 149–390)
PMV BLD AUTO: 9.2 FL (ref 8.9–12.7)
POTASSIUM SERPL-SCNC: 3.8 MMOL/L (ref 3.5–5.3)
RBC # BLD AUTO: 4.54 MILLION/UL (ref 3.81–5.12)
SODIUM SERPL-SCNC: 137 MMOL/L (ref 135–147)
WBC # BLD AUTO: 8.18 THOUSAND/UL (ref 4.31–10.16)

## 2025-03-13 PROCEDURE — 99239 HOSP IP/OBS DSCHRG MGMT >30: CPT | Performed by: PHYSICIAN ASSISTANT

## 2025-03-13 PROCEDURE — 85025 COMPLETE CBC W/AUTO DIFF WBC: CPT | Performed by: INTERNAL MEDICINE

## 2025-03-13 PROCEDURE — 80048 BASIC METABOLIC PNL TOTAL CA: CPT | Performed by: INTERNAL MEDICINE

## 2025-03-13 PROCEDURE — 99232 SBSQ HOSP IP/OBS MODERATE 35: CPT | Performed by: STUDENT IN AN ORGANIZED HEALTH CARE EDUCATION/TRAINING PROGRAM

## 2025-03-13 RX ORDER — DOXYCYCLINE 100 MG/1
100 TABLET ORAL 2 TIMES DAILY
Qty: 28 TABLET | Refills: 0 | Status: SHIPPED | OUTPATIENT
Start: 2025-03-13 | End: 2025-03-27

## 2025-03-13 RX ORDER — LISINOPRIL 20 MG/1
20 TABLET ORAL DAILY
Status: DISCONTINUED | OUTPATIENT
Start: 2025-03-13 | End: 2025-03-13 | Stop reason: HOSPADM

## 2025-03-13 RX ORDER — LISINOPRIL 20 MG/1
20 TABLET ORAL DAILY
Qty: 30 TABLET | Refills: 0 | Status: SHIPPED | OUTPATIENT
Start: 2025-03-14

## 2025-03-13 RX ADMIN — VENLAFAXINE HYDROCHLORIDE 150 MG: 150 CAPSULE, EXTENDED RELEASE ORAL at 08:27

## 2025-03-13 RX ADMIN — CEFEPIME HYDROCHLORIDE 2000 MG: 2 INJECTION, SOLUTION INTRAVENOUS at 00:46

## 2025-03-13 RX ADMIN — IBUPROFEN 400 MG: 400 TABLET, FILM COATED ORAL at 00:48

## 2025-03-13 RX ADMIN — LISINOPRIL 20 MG: 20 TABLET ORAL at 08:27

## 2025-03-13 RX ADMIN — VANCOMYCIN HYDROCHLORIDE 1000 MG: 1 INJECTION, SOLUTION INTRAVENOUS at 05:09

## 2025-03-13 NOTE — PLAN OF CARE
Problem: PAIN - ADULT  Goal: Verbalizes/displays adequate comfort level or baseline comfort level  Description: Interventions:  - Encourage patient to monitor pain and request assistance  - Assess pain using appropriate pain scale  - Administer analgesics based on type and severity of pain and evaluate response  - Implement non-pharmacological measures as appropriate and evaluate response  - Consider cultural and social influences on pain and pain management  - Notify physician/advanced practitioner if interventions unsuccessful or patient reports new pain  Outcome: Progressing     Problem: INFECTION - ADULT  Goal: Absence or prevention of progression during hospitalization  Description: INTERVENTIONS:  - Assess and monitor for signs and symptoms of infection  - Monitor lab/diagnostic results  - Monitor all insertion sites, i.e. indwelling lines, tubes, and drains  - Monitor endotracheal if appropriate and nasal secretions for changes in amount and color  - [place] appropriate cooling/warming therapies per order  - Administer medications as ordered  - Instruct and encourage patient and family to use good hand hygiene technique  - Identify and instruct in appropriate isolation precautions for identified infection/condition  Outcome: Progressing  Goal: Absence of fever/infection during neutropenic period  Description: INTERVENTIONS:  - Monitor WBC    Outcome: Progressing     Problem: SAFETY ADULT  Goal: Patient will remain free of falls  Description: INTERVENTIONS:  - Educate patient/family on patient safety including physical limitations  - Instruct patient to call for assistance with activity   - Consult OT/PT to assist with strengthening/mobility   - Keep Call bell within reach  - Keep bed low and locked with side rails adjusted as appropriate  - Keep care items and personal belongings within reach  - Initiate and maintain comfort rounds  - Make Fall Risk Sign visible to staff  - Offer Toileting every 2 Hours,  in advance of need  - Initiate/Maintain bed alarm  - Obtain necessary fall risk management equipment  - Apply yellow socks and bracelet for high fall risk patients  - Consider moving patient to room near nurses station  Outcome: Progressing  Goal: Maintain or return to baseline ADL function  Description: INTERVENTIONS:  -  Assess patient's ability to carry out ADLs; assess patient's baseline for ADL function and identify physical deficits which impact ability to perform ADLs (bathing, care of mouth/teeth, toileting, grooming, dressing, etc.)  - Assess/evaluate cause of self-care deficits   - Assess range of motion  - Assess patient's mobility; develop plan if impaired  - Assess patient's need for assistive devices and provide as appropriate  - Encourage maximum independence but intervene and supervise when necessary  - Involve family in performance of ADLs  - Assess for home care needs following discharge   - Consider OT consult to assist with ADL evaluation and planning for discharge  - Provide patient education as appropriate  Outcome: Progressing  Goal: Maintains/Returns to pre admission functional level  Description: INTERVENTIONS:  - Perform AM-PAC 6 Click Basic Mobility/ Daily Activity assessment daily.  - Set and communicate daily mobility goal to care team and patient/family/caregiver.   - Collaborate with rehabilitation services on mobility goals if consulted  - Perform Range of Motion 4 times a day.  - Reposition patient every 2 hours.  - Dangle patient 3 times a day  - Stand patient 3 times a day  - Ambulate patient 3 times a day  - Out of bed to chair 3 times a day   - Out of bed for meals 3 times a day  - Out of bed for toileting  - Record patient progress and toleration of activity level   Outcome: Progressing     Problem: DISCHARGE PLANNING  Goal: Discharge to home or other facility with appropriate resources  Description: INTERVENTIONS:  - Identify barriers to discharge w/patient and caregiver  -  Arrange for needed discharge resources and transportation as appropriate  - Identify discharge learning needs (meds, wound care, etc.)  - Arrange for interpretive services to assist at discharge as needed  - Refer to Case Management Department for coordinating discharge planning if the patient needs post-hospital services based on physician/advanced practitioner order or complex needs related to functional status, cognitive ability, or social support system  Outcome: Progressing     Problem: Knowledge Deficit  Goal: Patient/family/caregiver demonstrates understanding of disease process, treatment plan, medications, and discharge instructions  Description: Complete learning assessment and assess knowledge base.  Interventions:  - Provide teaching at level of understanding  - Provide teaching via preferred learning methods  Outcome: Progressing

## 2025-03-13 NOTE — ASSESSMENT & PLAN NOTE
Patient was kicked by horse anterior right shin 2/11/2025 initially with bruising which then faded however she noticed new erythema and tenderness about 1 week ago.  Placed on Keflex by urgent care 3/4. No improvement, saw outpatient ortho 3/10 who recommended ED evaluation.  Not meeting SIRS criteria  CT right lower extremity: 6.6 cm collection in the pretibial soft tissues of the anteromedial mid calf, likely hematoma. Superinfection cannot be excluded by imaging. No soft tissue gas. Mild circumferential subcutaneous fat stranding and skin thickening extending from just below the knee through the ankle, which can be seen with cellulitis.  ESR 28  CRP: 32.6  Discussed with orthopedics - stable for discharge home today  Recommended doxycycline x 2 weeks on discharge

## 2025-03-13 NOTE — UTILIZATION REVIEW
Continued Stay Review    Date: 3/13/25              Current Patient Class: Inpatient  Current Level of Care:  Med Surg    HPI:40 y.o. female initially admitted on 3/10/25     Current Diagnosis: Infection of wound hematoma, hypertension.    3/13     Medications:   Scheduled Medications:    cefepime, 2,000 mg, Intravenous, Q12H  heparin (porcine), 5,000 Units, Subcutaneous, Q8H NATE  lisinopril, 20 mg, Oral, Daily  vancomycin, 1,250 mg, Intravenous, Q12H  venlafaxine, 150 mg, Oral, Daily With Breakfast        lisinopril (ZESTRIL) tablet 20 mg  Dose: 20 mg  Freq: Daily Route: PO  Start: 03/13/25 0900    lisinopril (ZESTRIL) tablet 10 mg  Dose: 10 mg  Freq: Daily Route: PO  Start: 03/12/25 0900 End: 03/13/25 0712    Continuous IV Infusions:  None.      PRN Meds:    acetaminophen, 650 mg, Oral, Q6H PRN  ALPRAZolam, 0.25 mg, Oral, Daily PRN  hydrALAZINE, 5 mg, Intravenous, Q6H PRN x 1 dose 3/12   ibuprofen, 400 mg, Oral, Q6H PRN      Discharge Plan: ***    Vital Signs (last 3 days)       Date/Time Temp Pulse Resp BP MAP (mmHg) SpO2 O2 Device Patient Position - Orthostatic VS Pain    03/13/25 08:02:27 -- 73 -- 161/100 120 95 % -- -- --    03/13/25 0057 -- 70 -- -- -- -- -- -- --    03/13/25 0048 -- -- -- -- -- -- -- -- 5    03/12/25 21:15:59 -- -- 16 155/104 121 -- -- -- No Pain    03/12/25 17:38:57 -- -- -- 149/101 117 -- -- -- --    03/12/25 1500 98.2 °F (36.8 °C) -- 20 -- -- -- -- Lying --    03/12/25 14:59:14 -- -- -- 163/101 122 -- -- -- --    03/12/25 13:54:25 -- -- -- 158/104 122 -- -- -- --    03/12/25 13:53:59 -- -- -- 158/104 122 -- -- -- --    03/12/25 1213 -- -- -- 158/90 -- -- -- Sitting --    03/12/25 12:11:29 -- -- -- 179/108 132 -- -- -- --    03/12/25 12:09:41 -- -- -- 176/115 135 -- -- -- --    03/12/25 0746 -- -- -- -- -- -- -- -- 1    03/12/25 07:38:35 -- 82 -- 169/118 135 94 % -- -- --    03/12/25 07:37:08 97.9 °F (36.6 °C) 79 20 178/115 136 97 % -- -- --    03/12/25 0018 -- 74 -- 138/94 109 91 % -- --  --    03/11/25 22:19:05 -- 79 16 -- -- 97 % None (Room air) -- No Pain    03/11/25 1501 -- -- -- -- -- -- -- -- 6    03/11/25 14:59:22 -- 98 -- 156/94 115 93 % -- -- --    03/11/25 1117 -- -- -- -- -- 97 % None (Room air) -- --    03/11/25 0942 -- -- -- -- -- -- -- -- 8    03/10/25 23:22:59 -- 75 16 160/107 125 95 % None (Room air) -- No Pain    03/10/25 16:28:24 96.6 °F (35.9 °C) 93 16 168/112 131 96 % -- -- --    03/10/25 1537 -- -- -- -- -- 95 % None (Room air) -- --    03/10/25 1423 -- -- -- -- -- -- -- -- 2    03/10/25 14:07:48 96.6 °F (35.9 °C) -- -- 170/109 129 -- -- -- --    03/10/25 1312 -- 90 13 180/103 135 98 % -- -- --    03/10/25 1242 -- 97 -- 199/121 153 97 % -- -- --    03/10/25 1236 98.8 °F (37.1 °C) 103 20 199/121 -- 97 % None (Room air) Sitting No Pain          Pertinent Labs/Diagnostic Results:   Radiology:    VAS VENOUS DUPLEX -LOWER LIMB UNILATERAL   Final Interpretation by Billy Castro MD (03/11 2816)     RIGHT LOWER LIMB  No evidence of acute or chronic deep vein thrombosis .  No evidence of superficial thrombophlebitis noted.  Doppler evaluation shows a normal response to augmentation maneuvers.  Popliteal, posterior tibial and anterior tibial arterial Doppler waveforms are  triphasic.     LEFT LOWER LIMB LIMITED  Evaluation shows no evidence of thrombus in the common femoral vein.  Doppler evaluation shows a normal response to augmentation maneuvers.        CT lower extremity w contrast right   Final Interpretation by Deejay Davidson MD (03/10 1425)      6.6 cm collection in the pretibial soft tissues of the anteromedial mid calf, likely hematoma. Superinfection cannot be excluded by imaging. No soft tissue gas.      Mild circumferential subcutaneous fat stranding and skin thickening extending from just below the knee through the ankle, which can be seen with cellulitis.         Workstation performed: [id number]           Cardiology:    ECG 12 lead   Final Result by  Robles Robertson MD (03/11 2347)   Normal sinus rhythm   Rightward axis   Borderline ECG   When compared with ECG of 12-Feb-2024 14:32,   No significant change was found   Confirmed by Robles Robertson ([id number]) on 3/11/2025 11:47:19 PM                  Results from last 7 days   Lab Units 03/13/25 0417 03/12/25 0404 03/11/25  0459 03/10/25  1244   WBC Thousand/uL 8.18 7.77 7.24 8.33   HEMOGLOBIN g/dL 13.1 13.4 13.5 14.3   HEMATOCRIT % 40.1 40.6 42.0 43.2   PLATELETS Thousands/uL 330 315 309 353   TOTAL NEUT ABS Thousands/µL 5.24 5.01 4.50 6.13         Results from last 7 days   Lab Units 03/13/25  0417 03/12/25  0404 03/11/25  0459 03/10/25  1244   SODIUM mmol/L 137 138 137 137   POTASSIUM mmol/L 3.8 4.1 4.3 4.1   CHLORIDE mmol/L 104 106 104 103   CO2 mmol/L 27 25 26 29   ANION GAP mmol/L 6 7 7 5   BUN mg/dL 15 13 11 16   CREATININE mg/dL 0.66 0.71 0.62 0.76   EGFR ml/min/1.73sq m 109 105 111 97   CALCIUM mg/dL 8.3* 8.5 8.5 9.5     Results from last 7 days   Lab Units 03/10/25  1244   AST U/L 11*   ALT U/L 11   ALK PHOS U/L 53   TOTAL PROTEIN g/dL 7.6   ALBUMIN g/dL 4.3   TOTAL BILIRUBIN mg/dL 0.29         Results from last 7 days   Lab Units 03/13/25 0417 03/12/25  0404 03/11/25  0459 03/10/25  1244   GLUCOSE RANDOM mg/dL 103 94 78 74             Results from last 7 days   Lab Units 03/10/25  1244   PROTIME seconds 12.7   INR  0.91   PTT seconds 27         Results from last 7 days   Lab Units 03/10/25  1244   PROCALCITONIN ng/ml <0.05     Results from last 7 days   Lab Units 03/10/25  1244   LACTIC ACID mmol/L 0.7               Results from last 7 days   Lab Units 03/10/25  1244   CRP mg/L 32.6*   SED RATE mm/hour 28*               Results from last 7 days   Lab Units 03/10/25  1244   BLOOD CULTURE  No Growth at 48 hrs.  No Growth at 48 hrs.             Results from last 7 days   Lab Units 03/12/25  0404   VANCOMYCIN RM ug/mL 9.3*           Network Utilization Review Department  ATTENTION: Please call with any  questions or concerns to [phone] and carefully listen to the prompts so that you are directed to the right person. All voicemails are confidential.   For Discharge needs, contact Care Management DC Support Team at [phone] opt. 2  Send all requests for admission clinical reviews, approved or denied determinations and any other requests to dedicated fax number below belonging to the [facility] where the patient is receiving treatment. List of dedicated fax numbers for the Facilities:  FACILITY NAME UR FAX NUMBER   ADMISSION DENIALS (Administrative/Medical Necessity) [phone]   DISCHARGE SUPPORT TEAM (NETWORK) [phone]   PARENT CHILD HEALTH (Maternity/NICU/Pediatrics) [phone]   [facility] [phone]   [facility] [phone]   [facility] [phone]   [facility] [phone]   [facility] [phone]   [facility] [phone]   [facility] [phone]   [facility] [phone]   [facility] [phone]   [facility] [phone]   [facility] [phone]   [place] [facility] [phone]

## 2025-03-13 NOTE — DISCHARGE INSTR - AVS FIRST PAGE
Follow-up with PCP as previously scheduled  Continue doxycycline to complete antibiotic course  Monitor your blood pressure at least daily.  If systolic (top number) < 120, would recommend holding lisinopril.  Keep a log of your blood pressures to review with your PCP next week.    Return to the emergency department for further evaluation with any chest pain/palpitations, shortness of breath, nausea/vomiting, abdominal pain, fever/chills, worsening redness or swelling.

## 2025-03-13 NOTE — ASSESSMENT & PLAN NOTE
-190 on admission   Patient is not on anti-hypertensives at baseline   Persistent HTN despite adequate control of pain and anxiety - start lisinopril 3/12  Recommend close outpt follow up with PCP and at-home monitoring of BP

## 2025-03-13 NOTE — PROGRESS NOTES
Kaitlynn Madera is a 40 y.o. female who is currently ordered Vancomycin IV with management by the Pharmacy Consult service.  Relevant clinical data and objective / subjective history reviewed.  Vancomycin Assessment:  Indication and Goal AUC/Trough: Soft tissue (goal -600, trough >10), -600, trough >10  Clinical Status: improving  Micro:     Renal Function:  SCr: 0.66 mg/dL  CrCl: 123 mL/min  Renal replacement: Not on dialysis  Days of Therapy: 4  Current Dose: 1000 mg Q12H  Vancomycin Plan:  New Dosin mg Q12H  Estimated AUC: 485 mcg*hr/mL  Estimated Trough: 12.9 mcg/mL  Next Level: Random trough @0500 on   Renal Function Monitoring: Daily BMP and UOP  Pharmacy will continue to follow closely for s/sx of nephrotoxicity, infusion reactions and appropriateness of therapy.  BMP and CBC will be ordered per protocol. We will continue to follow the patient’s culture results and clinical progress daily.    Ivonne Olivares, Pharmacist

## 2025-03-13 NOTE — DISCHARGE SUMMARY
Discharge Summary - Hospitalist   Name: Kaitlynn Madera 40 y.o. female I MRN: [id number]  Unit/Bed#: [facility] I Date of Admission: 3/10/2025   Date of Service: 3/13/2025 I Hospital Day: 3     Assessment & Plan  Infection of wound hematoma  Patient was kicked by horse anterior right shin 2/11/2025 initially with bruising which then faded however she noticed new erythema and tenderness about 1 week ago.  Placed on Keflex by urgent care 3/4. No improvement, saw outpatient ortho 3/10 who recommended ED evaluation.  Not meeting SIRS criteria  CT right lower extremity: 6.6 cm collection in the pretibial soft tissues of the anteromedial mid calf, likely hematoma. Superinfection cannot be excluded by imaging. No soft tissue gas. Mild circumferential subcutaneous fat stranding and skin thickening extending from just below the knee through the ankle, which can be seen with cellulitis.  ESR 28  CRP: 32.6  Discussed with orthopedics - stable for discharge home today  Recommended doxycycline x 2 weeks on discharge  Elevated blood pressure reading  -190 on admission   Patient is not on anti-hypertensives at baseline   Persistent HTN despite adequate control of pain and anxiety - start lisinopril 3/12  Recommend close outpt follow up with PCP and at-home monitoring of BP  Anxiety  Continue Effexor  Continue home xanax PRN patient reports last taking about 2 weeks ago  Migraine without aura and without status migrainosus, not intractable  Hx of migraines      Medical Problems       Resolved Problems  Date Reviewed: 3/10/2025   None       Discharging Physician / Practitioner: Rosy Phelps PA-C  PCP: Shannon Murrieta PA-C  Admission Date:   Admission Orders (From admission, onward)       Ordered        03/10/25 1304  INPATIENT ADMISSION  Once                          Discharge Date: 03/13/25    Consultations During Hospital Stay:  Orthopedics    Procedures Performed:   None    Significant Findings / Test Results:  "  CT RLE: 6.6 cm collection in the pretibial soft tissues of the anteromedial mid calf, likely hematoma. Superinfection cannot be excluded by imaging. No soft tissue gas.  Mild circumferential subcutaneous fat stranding and skin thickening extending from just below the knee through the ankle, which can be seen with cellulitis.  Blood cultures negative x 2 after 48 hours    Incidental Findings:   None     Test Results Pending at Discharge (will require follow up):   None     Outpatient Tests Requested:  None    Complications:  None    Reason for Admission: Infected hematoma    Hospital Course:   Kaitlynn Madera is a 40 y.o. female patient who originally presented to the hospital on 3/10/2025 due to worsening right leg redness and swelling.    Past medical history significant for anxiety, migraines, osteoarthritis.  Patient presented to the emergency department due to worsening right lower extremity redness and swelling despite completing course of Keflex.  Concern for infected hematoma, patient was started on broad-spectrum IV antibiotics and orthopedics was consulted.  Clinically improved, no need for I&D per orthopedics.  Orthopedics recommended oral doxycycline for additional 2 weeks on discharge.  Patient had been noted to have persistently elevated blood pressures, started on lisinopril.  Discussed close outpatient follow-up and home monitoring of blood pressures.  On day of discharge patient was afebrile, hemodynamically stable and verbalized understanding for requested outpatient follow-up.      Please see above list of diagnoses and related plan for additional information.     Condition at Discharge: stable    Discharge Day Visit / Exam:   Subjective:  Feels leg has improved, eager for discharge.  Vitals: Blood Pressure: 161/100 (03/13/25 0802)  Pulse: 73 (03/13/25 0802)  Temperature: 98.2 °F (36.8 °C) (03/12/25 1500)  Temp Source: Oral (03/12/25 1500)  Respirations: 16 (03/12/25 2115)  Height: 5' 2.25\" " (158.1 cm) (03/10/25 1628)  SpO2: 95 % (03/13/25 1015)  Physical Exam  Vitals and nursing note reviewed.   Constitutional:       Appearance: Normal appearance.      Comments: No acute distress   HENT:      Head: Normocephalic.   Eyes:      General: No scleral icterus.     Extraocular Movements: Extraocular movements intact.      Conjunctiva/sclera: Conjunctivae normal.   Cardiovascular:      Rate and Rhythm: Normal rate and regular rhythm.   Pulmonary:      Effort: Pulmonary effort is normal.      Breath sounds: Normal breath sounds. No wheezing, rhonchi or rales.   Abdominal:      General: Bowel sounds are normal.      Palpations: Abdomen is soft.      Tenderness: There is no abdominal tenderness. There is no guarding or rebound.   Musculoskeletal:         General: No swelling, tenderness or deformity.      Cervical back: Normal range of motion.      Comments: Able to move upper/lower ext bilaterally, R anterior leg swelling improved   Skin:     General: Skin is warm and dry.      Findings: Erythema present.   Neurological:      Mental Status: She is alert and oriented to person, place, and time.   Psychiatric:         Mood and Affect: Mood normal.         Speech: Speech normal.         Behavior: Behavior normal.          Discussion with Family: Patient declined call to .     Discharge instructions/Information to patient and family:   See after visit summary for information provided to patient and family.      Provisions for Follow-Up Care:  See after visit summary for information related to follow-up care and any pertinent home health orders.      Mobility at time of Discharge:   Basic Mobility Inpatient Raw Score: 24  JH-HLM Goal: 8: Walk 250 feet or more  JH-HLM Achieved: 8: Walk 250 feet ot more  HLM Goal achieved. Continue to encourage appropriate mobility.     Disposition:   Home    Planned Readmission: None    Discharge Medications:  See after visit summary for reconciled discharge medications  provided to patient and/or family.      Administrative Statements   Discharge Statement:  I have spent a total time of 60 minutes in caring for this patient on the day of the visit/encounter. >30 minutes of time was spent on: Diagnostic results, Instructions for management, Patient and family education, Importance of tx compliance, Risk factor reductions, Impressions, Counseling / Coordination of care, Documenting in the medical record, Reviewing / ordering tests, medicine, procedures  , and Communicating with other healthcare professionals .    **Please Note: This note may have been constructed using a voice recognition system**

## 2025-03-13 NOTE — PLAN OF CARE
Problem: PAIN - ADULT  Goal: Verbalizes/displays adequate comfort level or baseline comfort level  Description: Interventions:  - Encourage patient to monitor pain and request assistance  - Assess pain using appropriate pain scale  - Administer analgesics based on type and severity of pain and evaluate response  - Implement non-pharmacological measures as appropriate and evaluate response  - Consider cultural and social influences on pain and pain management  - Notify physician/advanced practitioner if interventions unsuccessful or patient reports new pain  Outcome: Progressing     Problem: INFECTION - ADULT  Goal: Absence or prevention of progression during hospitalization  Description: INTERVENTIONS:  - Assess and monitor for signs and symptoms of infection  - Monitor lab/diagnostic results  - Monitor all insertion sites, i.e. indwelling lines, tubes, and drains  - Monitor endotracheal if appropriate and nasal secretions for changes in amount and color  - [place] appropriate cooling/warming therapies per order  - Administer medications as ordered  - Instruct and encourage patient and family to use good hand hygiene technique  - Identify and instruct in appropriate isolation precautions for identified infection/condition  Outcome: Progressing  Goal: Absence of fever/infection during neutropenic period  Description: INTERVENTIONS:  - Monitor WBC    Outcome: Progressing     Problem: SAFETY ADULT  Goal: Patient will remain free of falls  Description: INTERVENTIONS:  - Educate patient/family on patient safety including physical limitations  - Instruct patient to call for assistance with activity   - Consult OT/PT to assist with strengthening/mobility   - Keep Call bell within reach  - Keep bed low and locked with side rails adjusted as appropriate  - Keep care items and personal belongings within reach  - Initiate and maintain comfort rounds  - Make Fall Risk Sign visible to staff  - Offer Toileting every x Hours,  in advance of need  - Initiate/Maintain xalarm  - Obtain necessary fall risk management equipment: x  - Apply yellow socks and bracelet for high fall risk patients  - Consider moving patient to room near nurses station  Outcome: Progressing  Goal: Maintain or return to baseline ADL function  Description: INTERVENTIONS:  -  Assess patient's ability to carry out ADLs; assess patient's baseline for ADL function and identify physical deficits which impact ability to perform ADLs (bathing, care of mouth/teeth, toileting, grooming, dressing, etc.)  - Assess/evaluate cause of self-care deficits   - Assess range of motion  - Assess patient's mobility; develop plan if impaired  - Assess patient's need for assistive devices and provide as appropriate  - Encourage maximum independence but intervene and supervise when necessary  - Involve family in performance of ADLs  - Assess for home care needs following discharge   - Consider OT consult to assist with ADL evaluation and planning for discharge  - Provide patient education as appropriate  Outcome: Progressing  Goal: Maintains/Returns to pre admission functional level  Description: INTERVENTIONS:  - Perform AM-PAC 6 Click Basic Mobility/ Daily Activity assessment daily.  - Set and communicate daily mobility goal to care team and patient/family/caregiver.   - Collaborate with rehabilitation services on mobility goals if consulted  - Perform Range of Motion x times a day.  - Reposition patient every x hours.  - Dangle patient x times a day  - Stand patient x times a day  - Ambulate patient x times a day  - Out of bed to chair x times a day   - Out of bed for meals xxx times a day  - Out of bed for toileting  - Record patient progress and toleration of activity level   Outcome: Progressing     Problem: DISCHARGE PLANNING  Goal: Discharge to home or other facility with appropriate resources  Description: INTERVENTIONS:  - Identify barriers to discharge w/patient and caregiver  -  Arrange for needed discharge resources and transportation as appropriate  - Identify discharge learning needs (meds, wound care, etc.)  - Arrange for interpretive services to assist at discharge as needed  - Refer to Case Management Department for coordinating discharge planning if the patient needs post-hospital services based on physician/advanced practitioner order or complex needs related to functional status, cognitive ability, or social support system  Outcome: Progressing     Problem: Knowledge Deficit  Goal: Patient/family/caregiver demonstrates understanding of disease process, treatment plan, medications, and discharge instructions  Description: Complete learning assessment and assess knowledge base.  Interventions:  - Provide teaching at level of understanding  - Provide teaching via preferred learning methods  Outcome: Progressing

## 2025-03-13 NOTE — ASSESSMENT & PLAN NOTE
-RLE hematoma developed after being kicked by a horse over 1 month ago.  Symptoms of infected hematoma and cellulitis.  -History of Unicompartmental knee arthroplasty with Dr. Gray on 3/7/25  -CT of the right lower extremity shows subcutaneous fat stranding and skin thickening overlying the pretibial soft tissue with extension to the ankle  - Symptoms are improving on IV antibiotics, continue with IV cefepime and vanco for another 24 hours, then may d/c on oral antibiotics as long as symptoms continue to improve.  - WBAT RLE  - Patient stable from ortho perspective for DC on oral abx  Patient to follow up as outpatient with Dr. Gray

## 2025-03-13 NOTE — PROGRESS NOTES
Progress Note - Orthopedics   Name: Kaitlynn Madera 40 y.o. female I MRN: [id number]  Unit/Bed#: -01 I Date of Admission: 3/10/2025   Date of Service: 3/13/2025 I Hospital Day: 3    Assessment & Plan  Infection of wound hematoma  -RLE hematoma developed after being kicked by a horse over 1 month ago.  Symptoms of infected hematoma and cellulitis.  -History of Unicompartmental knee arthroplasty with Dr. Gray on 3/7/25  -CT of the right lower extremity shows subcutaneous fat stranding and skin thickening overlying the pretibial soft tissue with extension to the ankle  - Symptoms are improving on IV antibiotics, continue with IV cefepime and vanco for another 24 hours, then may d/c on oral antibiotics as long as symptoms continue to improve.  - WBAT RLE  - Patient stable from ortho perspective for DC on oral abx  Patient to follow up as outpatient with Dr. Gray        Subjective   40 y.o.female seen and examined at bedside, states that she overall is doing well and notices improvement in her erythema. No acute events, no new complaints. Pain well controlled. Denies fevers, chills, CP, SOB, N/V, numbness or tingling.  Objective :  Temp:  [98.2 °F (36.8 °C)] 98.2 °F (36.8 °C)  HR:  [70] 70  BP: (149-179)/() 155/104  Resp:  [16-20] 16    Physical Exam  Musculoskeletal: Right lower extremity  Skin intact, erythema and warmth over the anterior shin  No palpable fluctuance on exam today  Motor intact anterior interosseous nerve/posterior interosseous nerve/median/radial/ulnar nerve distributions  Motor intact to +FHL/EHL, +ankle dorsi/plantar flexion  2+ DP pulse  No calf swelling or tenderness to palpation  Thigh and calf soft and compressible        Lab Results: I have reviewed the following results:  Recent Labs     03/10/25  1244 03/11/25  0459 03/12/25  0404 03/13/25  0417   WBC 8.33 7.24 7.77 8.18   HGB 14.3 13.5 13.4 13.1   HCT 43.2 42.0 40.6 40.1    309 315 330   BUN 16 11 13 15   CREATININE  "0.76 0.62 0.71 0.66   PTT 27  --   --   --    INR 0.91  --   --   --    ESR 28*  --   --   --    CRP 32.6*  --   --   --      Blood Culture:    Lab Results   Component Value Date    BLOODCX No Growth at 48 hrs. 03/10/2025    BLOODCX No Growth at 48 hrs. 03/10/2025     Wound Culture: No results found for: \"WOUNDCULT\"      "

## 2025-03-14 NOTE — UTILIZATION REVIEW
NOTIFICATION OF ADMISSION DISCHARGE   This is a Notification of Discharge from [facility]. Please be advised that this patient has been discharge from our facility. Below you will find the admission and discharge date and time including the patient’s disposition.   UTILIZATION REVIEW CONTACT:  Dolores Wolfe  Utilization   Network Utilization Review Department  Phone: [phone] x carefully listen to the prompts. All voicemails are confidential.  Email: [email or web address]     ADMISSION INFORMATION  PRESENTATION DATE: 3/10/2025 12:32 PM  OBERVATION ADMISSION DATE: N/A  INPATIENT ADMISSION DATE: 3/10/25  1:04 PM   DISCHARGE DATE: 3/13/2025 12:35 PM   DISPOSITION:Home/Self Care    Network Utilization Review Department  ATTENTION: Please call with any questions or concerns to [phone] and carefully listen to the prompts so that you are directed to the right person. All voicemails are confidential.   For Discharge needs, contact Care Management DC Support Team at [phone] opt. 2  Send all requests for admission clinical reviews, approved or denied determinations and any other requests to dedicated fax number below belonging to the campus where the patient is receiving treatment. List of dedicated fax numbers for the Facilities:  FACILITY NAME UR FAX NUMBER   ADMISSION DENIALS (Administrative/Medical Necessity) [phone]   DISCHARGE SUPPORT TEAM ([facility]) [phone]   PARENT CHILD HEALTH (Maternity/NICU/Pediatrics) [phone]   [facility] [phone]   [facility] [phone]   [facility] [phone]   [facility] [phone]   [facility] [phone]   [facility] [phone]   [facility] [phone]   [facility] [phone]    [facility] [phone]   [facility] [phone]   [facility] [phone]   [facility] [phone]

## 2025-03-15 LAB
BACTERIA BLD CULT: NORMAL
BACTERIA BLD CULT: NORMAL

## 2025-03-18 ENCOUNTER — E-CONSULT (OUTPATIENT)
Dept: OTHER | Facility: HOSPITAL | Age: 40
End: 2025-03-18
Payer: COMMERCIAL

## 2025-03-18 ENCOUNTER — OFFICE VISIT (OUTPATIENT)
Dept: FAMILY MEDICINE CLINIC | Facility: CLINIC | Age: 40
End: 2025-03-18
Payer: COMMERCIAL

## 2025-03-18 VITALS
WEIGHT: 226.1 LBS | TEMPERATURE: 97.5 F | HEIGHT: 62 IN | BODY MASS INDEX: 41.61 KG/M2 | HEART RATE: 87 BPM | SYSTOLIC BLOOD PRESSURE: 130 MMHG | RESPIRATION RATE: 17 BRPM | OXYGEN SATURATION: 97 % | DIASTOLIC BLOOD PRESSURE: 82 MMHG

## 2025-03-18 DIAGNOSIS — T14.8XXA INFECTION OF WOUND HEMATOMA: Primary | ICD-10-CM

## 2025-03-18 DIAGNOSIS — E78.2 MIXED HYPERLIPIDEMIA: ICD-10-CM

## 2025-03-18 DIAGNOSIS — I10 ESSENTIAL (PRIMARY) HYPERTENSION: ICD-10-CM

## 2025-03-18 DIAGNOSIS — Z13.1 SCREENING FOR DIABETES MELLITUS: ICD-10-CM

## 2025-03-18 DIAGNOSIS — L08.9 INFECTION OF WOUND HEMATOMA: Primary | ICD-10-CM

## 2025-03-18 DIAGNOSIS — L03.115 CELLULITIS OF RIGHT ANTERIOR LOWER LEG: ICD-10-CM

## 2025-03-18 DIAGNOSIS — E66.01 MORBID OBESITY WITH BMI OF 40.0-44.9, ADULT (HCC): ICD-10-CM

## 2025-03-18 DIAGNOSIS — E66.9 OBESITY: ICD-10-CM

## 2025-03-18 DIAGNOSIS — Z96.651 STATUS POST UNICOMPARTMENTAL KNEE REPLACEMENT, RIGHT: ICD-10-CM

## 2025-03-18 DIAGNOSIS — S80.11XD HEMATOMA OF RIGHT LOWER EXTREMITY, SUBSEQUENT ENCOUNTER: Primary | ICD-10-CM

## 2025-03-18 PROCEDURE — 99496 TRANSJ CARE MGMT HIGH F2F 7D: CPT | Performed by: PHYSICIAN ASSISTANT

## 2025-03-18 PROCEDURE — 99451 NTRPROF PH1/NTRNET/EHR 5/>: CPT | Performed by: INTERNAL MEDICINE

## 2025-03-18 RX ORDER — CLINDAMYCIN HYDROCHLORIDE 150 MG/1
150 CAPSULE ORAL EVERY 8 HOURS SCHEDULED
Qty: 30 CAPSULE | Refills: 0 | Status: SHIPPED | OUTPATIENT
Start: 2025-03-18 | End: 2025-03-28

## 2025-03-18 RX ORDER — TIRZEPATIDE 2.5 MG/.5ML
2.5 INJECTION, SOLUTION SUBCUTANEOUS WEEKLY
Qty: 2 ML | Refills: 0 | Status: SHIPPED | OUTPATIENT
Start: 2025-03-18 | End: 2025-04-15

## 2025-03-18 NOTE — ASSESSMENT & PLAN NOTE
Kicked by horse 2/11/25. No break in the skin. By 3/4 becoming red, tender. Went to urgent care placed on Keflex. 3/10 more swollen, red, warm so went to ER. On Vanco and Cefepime with improvement over 72 hours. Discharge on doxy and no improvement, more swelling, becoming more red, slightly tender. D/c doxy, start clindamycin 150 mg tid x 10 days, elevate leg, warm compresses. Will consult ID for further recommendations. Consider wound care?  Orders:    clindamycin (CLEOCIN) 150 mg capsule; Take 1 capsule (150 mg total) by mouth every 8 (eight) hours for 10 days    AMB E-CONSULT TO INFECTIOUS DISEASE

## 2025-03-18 NOTE — PROGRESS NOTES
"E-Consult  Kaitlynn Madera 40 y.o. female MRN: [id number]  Encounter Date: 03/18/25    An E. Consult is for educational purposes for the referring provider. This does not constitute any official orders and recommendations will not be provided directly to the patient by the ID provider. Communication with the patient is the responsibility of the referring provider.     Reason for Consult / Principal Problem: Cellulitis     Consulting Provider: Jeovany Yap MD    Requesting Provider: Shannon Murrieta P*     Kaitlynn Madera is a 40 y.o. woman with PMH of anxiety, migraines, and right UKA (3/7/24) who was admitted to [facility] from 3/10 to 3/13 after presenting to the ED with worsening right lower extremity redness and swelling after she was kicked by a horse.  She had limited response to cephalexin 500 mg every 8 hours prescribed by an urgent care.  CT RLE on 3/10 showed \"4 x 1.8 x 6.6 cm heterogeneous complex faintly rim-enhancing collection in the pretibial soft tissues of the anteromedial mid calf\". US was negative for DVT.  Blood cultures were negative, procalcitonin and lactate normal.  She had no leukocytosis.  She was treated with IV cefepime and vancomycin, reportedly having improvement.  She was discharged on doxycycline 100 mg BID.    Patient's PCP requesting E consult as swelling and warmth worsening after discharge.  They started clindamycin 150 mg TID x10 days.    ASSESSMENT:  RLE hematoma  Possible cellulitis  Obesity (BMI 41.02).  Impacts antibiotic dosing.    RECOMMENDATIONS:    While admitted, CT showed large right lower extremity hematoma.  As patient had no SIRS criteria, no leukocytosis, negative blood cultures, hematoma collection was felt to be not infected.  She was treated for skin and soft tissue infection overlying hematoma site.  Response reported to vancomycin and cefepime while in the hospital.  This could be from treatment of cellulitis, or also leg " elevation while laying in bed.  Consider Strep infection, which may not adequately be treated by doxycycline.  Cephalexin prescribed by urgent care was significantly underdosed for her BMI.  Fortunately, the knee joint did not appear involved, no concerns for hardware infection.  - Consider noninfectious etiology.  Question whether patient is spending more time on her feet, perhaps having local swelling due to hematoma/impaired venous or lymphatic return  - If there is strong concern for infection, I would continue doxycycline and add cefadroxil 1 g twice daily (or alternatively cephalexin 1 g 4 times daily) for better Strep coverage  - I would avoid clindamycin due to 1) side effects, and 2) lack of reliable empiric activity against Staph aureus and Streptococcus  - Would recommend continued conservative measures like leg elevation, compression as tolerated  - If febrile or having significantly worsening symptoms would recommend return to hospital for consideration of infected hematoma / I&D with cultures.      Total time spent >5 min, >50% time spent reviewing/analyzing record, written report will be generated in the EMR. .    Jeovany Yap MD  [facility]

## 2025-03-18 NOTE — PROGRESS NOTES
Transition of Care Visit  Name: Kaitlynn Madera      : 1985      MRN: [id number]  Encounter Provider: Shannon Murrieta PA-C  Encounter Date: 3/18/2025   Encounter department: [facility]    Assessment & Plan  Infection of wound hematoma    Kicked by horse 25. No break in the skin. By 3/4 becoming red, tender. Went to urgent care placed on Keflex. 3/10 more swollen, red, warm so went to ER. On Vanco and Cefepime with improvement over 72 hours. Discharge on doxy and no improvement, more swelling, becoming more red, slightly tender. D/c doxy, start clindamycin 150 mg tid x 10 days, elevate leg, warm compresses. Will consult ID for further recommendations. Consider wound care?  Orders:    clindamycin (CLEOCIN) 150 mg capsule; Take 1 capsule (150 mg total) by mouth every 8 (eight) hours for 10 days    AMB E-CONSULT TO INFECTIOUS DISEASE    Cellulitis of right anterior lower leg    As above  Orders:    AMB E-CONSULT TO INFECTIOUS DISEASE    Essential (primary) hypertension    On lisinopril 20 mg, monitoring BP at home, follow up 1 month with labs         Mixed hyperlipidemia    Orders:    Lipid Panel with Direct LDL reflex; Future    Comprehensive metabolic panel; Future    Morbid obesity with BMI of 40.0-44.9, adult (HCC)  Prior Authorization Clinical Questions for Weight Management Pharmacotherapy    1. Does the patient have a contrainidcation to medication prescribed for weight management?: No  2. Does the patient have a diagnosis of obesity, confirmed by a BMI greater than or equal to 30 kg/m^2?: Yes  3. Does the patient have a BMI of greater than or equal to 27 kg/m^2 with at least one weight-related comorbidity/risk factor/complication (e.g. diabetes, dyslipidemia, coronary artery disease)?: Yes  4. Weight-related co-morbidities/risk factors: dyslipidemia, hypertension, osteoarthritis  5. WEGOVY CVA Indication: Does patient have established documented  cardiovascular disease (history of a prior heart attack (myocardial infarction), stroke, or symptomatic peripheral arterial disease (PAD)?: No  6. ZEPBOUND ROSELIA Indication: Does patient have documented ROSELIA diagnosed via sleep study (insurance will require copy of sleep study results for approval)?: No  7. Has the patient been on a weight loss regimen of low-calorie diet, increased physical activity, and lifestyle modifications for a minimum of 6 months?: Yes  8. Has the patient completed a comprehensive weight loss program (ie, Weight Watchers, Noom, Bariatrics, other dat on phone)? If so, what?: Yes   -- Q8 Further Explanation: dat on phone   9. Does the patient have a history of type 2 diabetes?: No  10. Has the member tried and failed other weight loss medication within the past 12 months?: Yes   -- Q10 Further explanation: Cannot tolerate Wegovy significant constipation, developed an illeus, nausea and vomiting limiting ADL   11. Will the member use requested medication in combination with another GLP agonist or weight loss drug?: No  12. Is the medication a controlled substance?: No     Baseline weight (in pounds): 226 lbs         Orders:    tirzepatide (Zepbound) 2.5 mg/0.5 mL auto-injector; Inject 0.5 mL (2.5 mg total) under the skin once a week for 28 days    Screening for diabetes mellitus    Orders:    Hemoglobin A1C; Future     F/u 1 month or sooner if needed    History of Present Illness     Transitional Care Management Review:   Kaitlynn Madera is a 40 y.o. female here for TCM follow up.     During the TCM phone call patient stated:  TCM Call (since 3/4/2025)       Date and time call was made  3/13/2025  3:01 PM    Hospital care reviewed  Records reviewed    Patient was hospitialized at  [facility]    Date of Admission  03/10/25    Date of discharge  03/13/25    Diagnosis  infection of wound hematoma    Disposition  Home    Were the patients medications reviewed and updated  Yes       "    TCM Call (since 3/4/2025)       Post hospital issues  None    Scheduled for follow up?  Yes    Did you obtain your prescribed medications  Yes    Do you need help managing your prescriptions or medications  No    Is transportation to your appointment needed  No    I have advised the patient to call PCP with any new or worsening symptoms  Jill LARSON    Living Arrangements  Family members          Kaitlynn Madera is a 40 y.o. female patient who originally presented to the hospital on 3/10/2025 due to worsening right leg redness and swelling. Presented to the emergency department due to worsening right lower extremity redness and swelling despite being on course of Keflex for 7 days.  Concern for infected hematoma, patient was started on broad-spectrum IV antibiotics and orthopedics was consulted.  Clinically improved, no need for I&D per orthopedics.  Orthopedics recommended oral doxycycline for additional 2 weeks on discharge.  Patient had been noted to have persistently elevated blood pressures, started on lisinopril.  Discussed close outpatient follow-up and home monitoring of blood pressures.  On day of discharge patient was afebrile, hemodynamically stable and verbalized understanding for requested outpatient follow-up.       Patient here noting no improvement with doxy. With  who is a physician and concerned it is becoming more swollen and red. Slightly tender per patient. Also notes it looks like it is coming to a \"white head\". Leg still very swollen, only wearing crocs. Denies fever.    Patient on lisinopril 20 mg, checking BP regularly at home and getting 145/94, 150/100.      Review of Systems   Constitutional:  Negative for chills and fever.   HENT:  Negative for ear pain and sore throat.    Eyes:  Negative for pain and visual disturbance.   Respiratory:  Negative for cough and shortness of breath.    Cardiovascular:  Positive for leg swelling. Negative for chest pain and palpitations. " "  Gastrointestinal:  Negative for abdominal pain and vomiting.   Genitourinary:  Negative for dysuria and hematuria.   Musculoskeletal:  Negative for arthralgias and back pain.   Skin:  Negative for color change and rash.   Neurological:  Negative for seizures and syncope.   All other systems reviewed and are negative.    Objective   /82 (Patient Position: Sitting, Cuff Size: Standard)   Pulse 87   Temp 97.5 °F (36.4 °C) (Temporal)   Resp 17   Ht 5' 2.25\" (1.581 m)   Wt 103 kg (226 lb 1.6 oz)   SpO2 97%   BMI 41.02 kg/m²     Physical Exam  Constitutional:       Appearance: Normal appearance. She is well-developed and normal weight.   HENT:      Head: Normocephalic and atraumatic.   Cardiovascular:      Rate and Rhythm: Normal rate and regular rhythm.      Pulses: Normal pulses.      Heart sounds: Normal heart sounds.   Pulmonary:      Effort: Pulmonary effort is normal.      Breath sounds: Normal breath sounds.   Musculoskeletal:         General: Normal range of motion.      Cervical back: Normal range of motion and neck supple.      Comments: Right lower leg with significant area of swelling and induration of skin, about 8 cm x 5 cm, tender. Slight erythema. No warmth.   Skin:     General: Skin is warm.   Neurological:      General: No focal deficit present.      Mental Status: She is alert and oriented to person, place, and time.   Psychiatric:         Mood and Affect: Mood normal.         Behavior: Behavior normal.         Thought Content: Thought content normal.         Judgment: Judgment normal.       Medications have been reviewed by provider in current encounter      "

## 2025-03-19 ENCOUNTER — TELEPHONE (OUTPATIENT)
Age: 40
End: 2025-03-19

## 2025-03-19 NOTE — TELEPHONE ENCOUNTER
Pt spouse inquired about the PA FOR ZEPBOUND 2.5MG SUBMITTED, I relayed that the PA was approved and rx was sent to pharmacy. Pt spouse had no further questions.

## 2025-03-19 NOTE — TELEPHONE ENCOUNTER
PA for Zepbound 2.5mg SUBMITTED to [employer]    via    []CMM-KEY:   [x]Surescripts-Case ID #: [id number]   []Availity-Auth ID  []Faxed to plan   []Other website   []Phone call Case ID #    []PA sent as URGENT    All office notes, labs and other pertaining documents and studies sent. Clinical questions answered. Awaiting determination from insurance company.     Turnaround time for your insurance to make a decision on your Prior Authorization can take 7-21 business days.

## 2025-03-19 NOTE — TELEPHONE ENCOUNTER
PA for Zepbound 2.5mg APPROVED     Date(s) approved March 19, 2025 to March 19, 2026     Case #: [id number]     Patient advised by          []RentPosthart Message  [x]Phone call   [x]LMOM  []L/M to call office as no active Communication consent on file  []Unable to leave detailed message as VM not approved on Communication consent       Pharmacy advised by    [x]Fax  []Phone call  []Secure Chat    Approval letter scanned into Media No - Awaiting letter

## 2025-03-21 DIAGNOSIS — N76.0 ACUTE VAGINITIS: Primary | ICD-10-CM

## 2025-03-21 RX ORDER — FLUCONAZOLE 150 MG/1
TABLET ORAL
Qty: 2 TABLET | Refills: 0 | Status: SHIPPED | OUTPATIENT
Start: 2025-03-21 | End: 2025-03-25

## 2025-03-21 RX ORDER — FLUCONAZOLE 150 MG/1
TABLET ORAL
Qty: 2 TABLET | Refills: 0 | Status: SHIPPED | OUTPATIENT
Start: 2025-03-21 | End: 2025-03-21

## 2025-04-01 DIAGNOSIS — I10 ESSENTIAL (PRIMARY) HYPERTENSION: Primary | ICD-10-CM

## 2025-04-01 RX ORDER — OLMESARTAN MEDOXOMIL 20 MG/1
20 TABLET ORAL DAILY
Qty: 100 TABLET | Refills: 3 | Status: SHIPPED | OUTPATIENT
Start: 2025-04-01

## 2025-04-07 DIAGNOSIS — M17.12 PRIMARY OSTEOARTHRITIS OF LEFT KNEE: ICD-10-CM

## 2025-04-07 DIAGNOSIS — M17.11 PRIMARY OSTEOARTHRITIS OF RIGHT KNEE: ICD-10-CM

## 2025-04-08 RX ORDER — DICLOFENAC SODIUM 75 MG/1
75 TABLET, DELAYED RELEASE ORAL 2 TIMES DAILY
Qty: 180 TABLET | Refills: 1 | Status: SHIPPED | OUTPATIENT
Start: 2025-04-08

## 2025-04-08 RX ORDER — ONDANSETRON 4 MG/1
4 TABLET, ORALLY DISINTEGRATING ORAL EVERY 6 HOURS PRN
Qty: 20 TABLET | Refills: 0 | Status: SHIPPED | OUTPATIENT
Start: 2025-04-08

## 2025-04-16 ENCOUNTER — OFFICE VISIT (OUTPATIENT)
Dept: FAMILY MEDICINE CLINIC | Facility: CLINIC | Age: 40
End: 2025-04-16
Payer: COMMERCIAL

## 2025-04-16 VITALS
OXYGEN SATURATION: 98 % | WEIGHT: 226 LBS | HEIGHT: 62 IN | BODY MASS INDEX: 41.59 KG/M2 | DIASTOLIC BLOOD PRESSURE: 92 MMHG | TEMPERATURE: 98.2 F | SYSTOLIC BLOOD PRESSURE: 150 MMHG | HEART RATE: 86 BPM | RESPIRATION RATE: 16 BRPM

## 2025-04-16 DIAGNOSIS — E66.9 OBESITY (BMI 30-39.9): Primary | ICD-10-CM

## 2025-04-16 DIAGNOSIS — I10 ESSENTIAL (PRIMARY) HYPERTENSION: ICD-10-CM

## 2025-04-16 PROBLEM — T14.8XXA INFECTION OF WOUND HEMATOMA: Status: RESOLVED | Noted: 2025-03-10 | Resolved: 2025-04-16

## 2025-04-16 PROBLEM — R03.0 ELEVATED BLOOD PRESSURE READING: Status: RESOLVED | Noted: 2025-03-10 | Resolved: 2025-04-16

## 2025-04-16 PROBLEM — L08.9 INFECTION OF WOUND HEMATOMA: Status: RESOLVED | Noted: 2025-03-10 | Resolved: 2025-04-16

## 2025-04-16 PROCEDURE — 99214 OFFICE O/P EST MOD 30 MIN: CPT | Performed by: PHYSICIAN ASSISTANT

## 2025-04-16 RX ORDER — TIRZEPATIDE 5 MG/.5ML
5 INJECTION, SOLUTION SUBCUTANEOUS WEEKLY
Qty: 2 ML | Refills: 0 | Status: SHIPPED | OUTPATIENT
Start: 2025-04-16

## 2025-04-16 RX ORDER — OLMESARTAN MEDOXOMIL 40 MG/1
40 TABLET ORAL DAILY
Qty: 100 TABLET | Refills: 1 | Status: SHIPPED | OUTPATIENT
Start: 2025-04-16

## 2025-04-16 NOTE — PROGRESS NOTES
"Name: Kaitlynn Madera      : 1985      MRN: [id number]  Encounter Provider: Shannon Murrieta PA-C  Encounter Date: 2025   Encounter department: [facility]  :  Assessment & Plan  Essential (primary) hypertension    Improvement on benicar, BP today 150/90, will increase benicar to 40 mg once daily. Continue diet, weight loss, exercise, repeat BP in 3 months. If not at goal will add HCTZ.  Orders:    olmesartan (BENICAR) 40 mg tablet; Take 1 tablet (40 mg total) by mouth daily    Obesity (BMI 30-39.9)  Success with 2.5 mg dose. Will increase to 5 mg mariela    Orders:    tirzepatide (Zepbound) 5 mg/0.5 mL auto-injector; Inject 0.5 mL (5 mg total) under the skin once a week    F/u 3 months and as needed       History of Present Illness   Checking BP at home 156/104, 164/108. Compliant with new medication      Review of Systems    Objective   /96   Pulse 86   Temp 98.2 °F (36.8 °C) (Temporal)   Resp 16   Ht 5' 2.25\" (1.581 m)   Wt 103 kg (226 lb)   BMI 41.00 kg/m²      BP Readings from Last 3 Encounters:   25 150/92   25 130/82   25 161/100       Physical Exam  Constitutional:       Appearance: Normal appearance. She is well-developed and normal weight.   HENT:      Head: Normocephalic and atraumatic.   Cardiovascular:      Rate and Rhythm: Normal rate and regular rhythm.      Pulses: Normal pulses.      Heart sounds: Normal heart sounds.   Pulmonary:      Effort: Pulmonary effort is normal.      Breath sounds: Normal breath sounds.   Musculoskeletal:         General: Normal range of motion.      Cervical back: Normal range of motion and neck supple.   Skin:     General: Skin is warm.   Neurological:      General: No focal deficit present.      Mental Status: She is alert and oriented to person, place, and time.   Psychiatric:         Mood and Affect: Mood normal.         Behavior: Behavior normal.         Thought Content: Thought " content normal.         Judgment: Judgment normal.

## 2025-04-16 NOTE — ASSESSMENT & PLAN NOTE
Improvement on benicar, BP today 150/90, will increase benicar to 40 mg once daily. Continue diet, weight loss, exercise, repeat BP in 3 months. If not at goal will add HCTZ.  Orders:    olmesartan (BENICAR) 40 mg tablet; Take 1 tablet (40 mg total) by mouth daily

## 2025-04-16 NOTE — ASSESSMENT & PLAN NOTE
Success with 2.5 mg dose. Will increase to 5 mg wekkly    Orders:    tirzepatide (Zepbound) 5 mg/0.5 mL auto-injector; Inject 0.5 mL (5 mg total) under the skin once a week

## 2025-04-19 DIAGNOSIS — Z00.6 ENCOUNTER FOR EXAMINATION FOR NORMAL COMPARISON OR CONTROL IN CLINICAL RESEARCH PROGRAM: ICD-10-CM

## 2025-05-02 NOTE — ANESTHESIA PROCEDURE NOTES
Peripheral Block    Patient location during procedure: holding area  Start time: 3/7/2024 2:58 PM  Reason for block: at surgeon's request and post-op pain management  Staffing  Performed by: Davis Corea MD  Authorized by: Davis Corea MD    Preanesthetic Checklist  Completed: patient identified, IV checked, site marked, risks and benefits discussed, surgical consent, monitors and equipment checked, pre-op evaluation and timeout performed  Peripheral Block  Patient position: supine  Prep: ChloraPrep  Patient monitoring: frequent blood pressure checks, continuous pulse oximetry and heart rate  Block type: Adductor Canal  Laterality: right  Injection technique: single-shot  Procedures: ultrasound guided, Ultrasound guidance required for the procedure to increase accuracy and safety of medication placement and decrease risk of complications.  Ultrasound permanent image savedbupivacaine (PF) (MARCAINE) 0.5 % injection 20 mL - Perineural   10 mL - 3/7/2024 2:58:00 PM  bupivacaine liposomal (EXPAREL) 1.3 % injection 20 mL - Perineural   20 mL - 3/7/2024 2:58:00 PM  midazolam (VERSED) injection 0.5 mg - Intravenous   2 mg - 3/7/2024 2:56:00 PM  Needle  Needle type: Stimuplex   Needle localization: anatomical landmarks and ultrasound guidance  Assessment  Injection assessment: incremental injection, frequent aspiration, injected with ease, negative aspiration, negative for heart rate change, no paresthesia on injection, no symptoms of intraneural/intravenous injection and needle tip visualized at all times  Paresthesia pain: none  Post-procedure:  site cleaned  patient tolerated the procedure well with no immediate complications           unknown

## 2025-05-16 DIAGNOSIS — E66.9 OBESITY (BMI 30-39.9): ICD-10-CM

## 2025-05-16 RX ORDER — TIRZEPATIDE 5 MG/.5ML
5 INJECTION, SOLUTION SUBCUTANEOUS WEEKLY
Qty: 2 ML | Refills: 0 | Status: SHIPPED | OUTPATIENT
Start: 2025-05-16

## 2025-05-20 ENCOUNTER — HOSPITAL ENCOUNTER (OUTPATIENT)
Dept: MAMMOGRAPHY | Facility: MEDICAL CENTER | Age: 40
Discharge: HOME | End: 2025-05-20
Payer: COMMERCIAL

## 2025-05-20 VITALS — HEIGHT: 62 IN | WEIGHT: 223 LBS | BODY MASS INDEX: 41.04 KG/M2

## 2025-05-20 DIAGNOSIS — Z12.31 ENCOUNTER FOR SCREENING MAMMOGRAM FOR MALIGNANT NEOPLASM OF BREAST: ICD-10-CM

## 2025-05-20 PROCEDURE — 77063 BREAST TOMOSYNTHESIS BI: CPT

## 2025-05-20 PROCEDURE — 77067 SCR MAMMO BI INCL CAD: CPT

## 2025-05-29 ENCOUNTER — OFFICE VISIT (OUTPATIENT)
Age: 40
End: 2025-05-29

## 2025-05-29 VITALS
BODY MASS INDEX: 40.89 KG/M2 | DIASTOLIC BLOOD PRESSURE: 82 MMHG | HEIGHT: 62 IN | SYSTOLIC BLOOD PRESSURE: 124 MMHG | WEIGHT: 222.2 LBS

## 2025-05-29 DIAGNOSIS — N92.1 BREAKTHROUGH BLEEDING WITH IUD: Primary | ICD-10-CM

## 2025-05-29 DIAGNOSIS — Z97.5 BREAKTHROUGH BLEEDING WITH IUD: Primary | ICD-10-CM

## 2025-05-30 NOTE — PROGRESS NOTES
":  Assessment & Plan  Breakthrough bleeding with IUD  - continue to observe for now  - if persistent or recurrent plan pelvic sono           History of Present Illness     Kaitlynn Madera is a 40 y.o. female who is s/p Mirena IUD replacement for BTB who presents with an episode of heavy bleeding.  She had been doing very well with the new IUD until now.  Did start a GLP-1.  Some stressors.  No illness.  She denies and pelvic, back or urinary pain.    PMH, PSH, Meds, Allergies, SocHx, FamHx reviewed and updated.    ROS:  pertinent findings in HPI    Objective   /82 (BP Location: Left arm, Patient Position: Sitting, Cuff Size: Large)   Ht 5' 2.25\" (1.581 m)   Wt 101 kg (222 lb 3.2 oz)   LMP 05/11/2025 (Exact Date)   BMI 40.32 kg/m²      Physical Exam  Constitutional:       Appearance: Normal appearance.   Genitourinary:      Vulva normal.      Vaginal bleeding present.      No vaginal discharge or erythema.        Right Adnexa: not tender, not full and no mass present.     Left Adnexa: not tender, not full and no mass present.     No cervical friability or polyp.      IUD strings visualized.      Uterus is not enlarged, fixed or tender.      No uterine mass detected.  HENT:      Head: Normocephalic.     Cardiovascular:      Rate and Rhythm: Normal rate and regular rhythm.   Pulmonary:      Effort: Pulmonary effort is normal.   Abdominal:      General: There is no distension.      Palpations: Abdomen is soft.      Tenderness: There is no abdominal tenderness.     Musculoskeletal:         General: No swelling.     Neurological:      General: No focal deficit present.      Mental Status: She is alert and oriented to person, place, and time.     Skin:     General: Skin is warm and dry.     Psychiatric:         Mood and Affect: Mood normal.         Behavior: Behavior normal.   Vitals reviewed.        "

## 2025-06-04 ENCOUNTER — HOSPITAL ENCOUNTER (OUTPATIENT)
Dept: MAMMOGRAPHY | Facility: CLINIC | Age: 40
Discharge: HOME | End: 2025-06-04
Attending: OBSTETRICS & GYNECOLOGY
Payer: COMMERCIAL

## 2025-06-04 ENCOUNTER — HOSPITAL ENCOUNTER (OUTPATIENT)
Dept: ULTRASOUND IMAGING | Facility: CLINIC | Age: 40
Discharge: HOME | End: 2025-06-04
Attending: OBSTETRICS & GYNECOLOGY
Payer: COMMERCIAL

## 2025-06-04 VITALS — HEIGHT: 62 IN | WEIGHT: 222 LBS | BODY MASS INDEX: 40.85 KG/M2

## 2025-06-04 DIAGNOSIS — R92.8 ABNORMAL SCREENING MAMMOGRAM: ICD-10-CM

## 2025-06-04 PROCEDURE — 76642 ULTRASOUND BREAST LIMITED: CPT

## 2025-06-04 PROCEDURE — G0279 TOMOSYNTHESIS, MAMMO: HCPCS

## 2025-06-04 PROCEDURE — 77065 DX MAMMO INCL CAD UNI: CPT

## 2025-06-05 ENCOUNTER — RESULTS FOLLOW-UP (OUTPATIENT)
Age: 40
End: 2025-06-05

## 2025-06-05 DIAGNOSIS — R92.8 ABNORMAL MAMMOGRAM OF RIGHT BREAST: Primary | ICD-10-CM

## 2025-06-15 DIAGNOSIS — M17.11 PRIMARY OSTEOARTHRITIS OF RIGHT KNEE: ICD-10-CM

## 2025-06-15 DIAGNOSIS — G43.909 MIGRAINE WITHOUT STATUS MIGRAINOSUS, NOT INTRACTABLE, UNSPECIFIED MIGRAINE TYPE: ICD-10-CM

## 2025-06-15 DIAGNOSIS — E66.9 OBESITY (BMI 30-39.9): ICD-10-CM

## 2025-06-15 RX ORDER — ELETRIPTAN HYDROBROMIDE 40 MG/1
40 TABLET, FILM COATED ORAL ONCE AS NEEDED
Qty: 12 TABLET | Refills: 0 | Status: SHIPPED | OUTPATIENT
Start: 2025-06-15

## 2025-06-15 RX ORDER — ONDANSETRON 4 MG/1
4 TABLET, ORALLY DISINTEGRATING ORAL EVERY 6 HOURS PRN
Qty: 20 TABLET | Refills: 0 | Status: SHIPPED | OUTPATIENT
Start: 2025-06-15

## 2025-06-15 RX ORDER — VENLAFAXINE HYDROCHLORIDE 150 MG/1
150 CAPSULE, EXTENDED RELEASE ORAL
Qty: 90 CAPSULE | Refills: 1 | Status: SHIPPED | OUTPATIENT
Start: 2025-06-15

## 2025-06-16 RX ORDER — TIRZEPATIDE 5 MG/.5ML
5 INJECTION, SOLUTION SUBCUTANEOUS WEEKLY
Qty: 2 ML | Refills: 0 | Status: SHIPPED | OUTPATIENT
Start: 2025-06-16

## 2025-07-16 ENCOUNTER — OFFICE VISIT (OUTPATIENT)
Dept: FAMILY MEDICINE CLINIC | Facility: CLINIC | Age: 40
End: 2025-07-16
Payer: COMMERCIAL

## 2025-07-16 VITALS
SYSTOLIC BLOOD PRESSURE: 122 MMHG | WEIGHT: 216 LBS | BODY MASS INDEX: 39.75 KG/M2 | DIASTOLIC BLOOD PRESSURE: 86 MMHG | HEART RATE: 88 BPM | RESPIRATION RATE: 16 BRPM | OXYGEN SATURATION: 98 % | HEIGHT: 62 IN | TEMPERATURE: 98 F

## 2025-07-16 DIAGNOSIS — B34.9 VIRAL ILLNESS: ICD-10-CM

## 2025-07-16 DIAGNOSIS — U07.1 COVID-19: ICD-10-CM

## 2025-07-16 DIAGNOSIS — I10 ESSENTIAL (PRIMARY) HYPERTENSION: Primary | ICD-10-CM

## 2025-07-16 LAB
SARS-COV-2 AG UPPER RESP QL IA: POSITIVE
VALID CONTROL: ABNORMAL

## 2025-07-16 PROCEDURE — 87811 SARS-COV-2 COVID19 W/OPTIC: CPT | Performed by: PHYSICIAN ASSISTANT

## 2025-07-16 PROCEDURE — 99213 OFFICE O/P EST LOW 20 MIN: CPT | Performed by: PHYSICIAN ASSISTANT

## 2025-07-16 NOTE — PROGRESS NOTES
"Name: Kaitlynn Madera      : 1985      MRN: [id number]  Encounter Provider: Shannon Murrieta PA-C  Encounter Date: 2025   Encounter department: [facility]  :  Assessment & Plan  Essential (primary) hypertension    Well controlled on benicar 40 mg once daily, get labs done, follow up 6 months        Viral illness    Orders:    POCT Rapid Covid Ag    COVID-19    Reassurance, fluids, rest. Symptomatic relief. Afebrile. Once feeling better can resume normal activities       F/u 6 months or sooner if needed       History of Present Illness   Patient has a cough, runny nose, itchy throat since . Monday slept all day. Just returned home from [place].    Also here for BP check. Compliant with medication, no complaints.       Review of Systems    Objective   /94   Pulse 88   Temp 98 °F (36.7 °C) (Temporal)   Resp 16   Ht 5' 2.25\" (1.581 m)   Wt 98 kg (216 lb)   SpO2 98%   BMI 39.19 kg/m²      BP Readings from Last 3 Encounters:   25 122/86   25 124/82   25 150/92       Physical Exam  Constitutional:       General: She is not in acute distress.     Appearance: Normal appearance. She is well-developed. She is not toxic-appearing.   HENT:      Head: Normocephalic and atraumatic.      Right Ear: Tympanic membrane, ear canal and external ear normal.      Left Ear: Tympanic membrane, ear canal and external ear normal.      Nose: Mucosal edema, congestion and rhinorrhea present.      Mouth/Throat:      Mouth: Mucous membranes are moist.      Pharynx: Oropharynx is clear. No oropharyngeal exudate or posterior oropharyngeal erythema.     Eyes:      Extraocular Movements: Extraocular movements intact.      Conjunctiva/sclera: Conjunctivae normal.       Cardiovascular:      Rate and Rhythm: Normal rate and regular rhythm.      Pulses: Normal pulses.      Heart sounds: Normal heart sounds.   Pulmonary:      Effort: Pulmonary effort is " normal.      Breath sounds: Normal breath sounds.     Musculoskeletal:         General: Normal range of motion.      Cervical back: Normal range of motion and neck supple. No rigidity or tenderness.   Lymphadenopathy:      Cervical: No cervical adenopathy.     Skin:     General: Skin is warm.     Neurological:      General: No focal deficit present.      Mental Status: She is alert and oriented to person, place, and time.     Psychiatric:         Mood and Affect: Mood normal.         Behavior: Behavior normal.         Thought Content: Thought content normal.         Judgment: Judgment normal.

## 2025-07-20 DIAGNOSIS — E66.9 OBESITY (BMI 30-39.9): ICD-10-CM

## 2025-07-22 RX ORDER — TIRZEPATIDE 5 MG/.5ML
5 INJECTION, SOLUTION SUBCUTANEOUS WEEKLY
Qty: 2 ML | Refills: 0 | Status: SHIPPED | OUTPATIENT
Start: 2025-07-22

## 2025-08-20 DIAGNOSIS — E66.9 OBESITY (BMI 30-39.9): Primary | ICD-10-CM

## 2025-08-20 DIAGNOSIS — M17.12 PRIMARY OSTEOARTHRITIS OF LEFT KNEE: ICD-10-CM

## 2025-08-20 DIAGNOSIS — I10 ESSENTIAL (PRIMARY) HYPERTENSION: ICD-10-CM

## 2025-08-20 DIAGNOSIS — M17.11 PRIMARY OSTEOARTHRITIS OF RIGHT KNEE: ICD-10-CM

## 2025-08-20 RX ORDER — OLMESARTAN MEDOXOMIL 40 MG/1
40 TABLET ORAL DAILY
Qty: 100 TABLET | Refills: 1 | Status: SHIPPED | OUTPATIENT
Start: 2025-08-20

## 2025-08-20 RX ORDER — DICLOFENAC SODIUM 75 MG/1
75 TABLET, DELAYED RELEASE ORAL 2 TIMES DAILY
Qty: 180 TABLET | Refills: 1 | Status: SHIPPED | OUTPATIENT
Start: 2025-08-20

## 2025-08-20 RX ORDER — TIRZEPATIDE 7.5 MG/.5ML
7.5 INJECTION, SOLUTION SUBCUTANEOUS WEEKLY
Qty: 2 ML | Refills: 0 | Status: SHIPPED | OUTPATIENT
Start: 2025-08-20

## (undated) DEVICE — GLOVE SRG BIOGEL 8.5

## (undated) DEVICE — SYRINGE CATH TIP 50ML

## (undated) DEVICE — SPECIMEN CONTAINER STERILE PEEL PACK

## (undated) DEVICE — EXOFIN PRECISION PEN HIGH VISCOSITY TOPICAL SKIN ADHESIVE: Brand: EXOFIN PRECISION PEN, 1G

## (undated) DEVICE — NEEDLE 18 G X 1 1/2

## (undated) DEVICE — HOOD WITH PEEL AWAY FACE SHIELD: Brand: T7PLUS

## (undated) DEVICE — DRAPE EQUIPMENT RF WAND

## (undated) DEVICE — GLOVE SRG BIOGEL 6.5

## (undated) DEVICE — I DISPOSABLE MIXING BOWL AND SPATULA: Brand: HOWMEDICA, MIX-KIT

## (undated) DEVICE — GLOVE INDICATOR PI UNDERGLOVE SZ 8.5 BLUE

## (undated) DEVICE — DRESSING MEPILEX AG BORDER POST-OP 4 X 12 IN

## (undated) DEVICE — PLUMEPEN PRO 10FT

## (undated) DEVICE — BETHLEHEM UNIV TOTAL KNEE, KIT: Brand: CARDINAL HEALTH

## (undated) DEVICE — SUT STRATAFIX SPIRAL PDS PLUS 1 CTX 18 IN SXPP1A400

## (undated) DEVICE — CAPIT KNEE OXF UNI FM/CM TB/UNI/BLD

## (undated) DEVICE — IMPERVIOUS STOCKINETTE: Brand: DEROYAL

## (undated) DEVICE — SUT VICRYL 1 CT-1 27 IN J261H

## (undated) DEVICE — CUFF TOURNIQUET 34 X 4 IN QUICK CONNECT DISP 1BLA

## (undated) DEVICE — 450 ML BOTTLE OF 0.05% CHLORHEXIDINE GLUCONATE IN 99.95% STERILE WATER FOR IRRIGATION, USP AND APPLICATOR.: Brand: IRRISEPT ANTIMICROBIAL WOUND LAVAGE

## (undated) DEVICE — PAD CAST 4 IN COTTON NON STERILE

## (undated) DEVICE — ACE WRAP 6 IN UNSTERILE

## (undated) DEVICE — BLADE SAW OXFORD

## (undated) DEVICE — DRESSING MEPILEX AG BORDER POST-OP 4 X 10 IN

## (undated) DEVICE — SUT VICRYL 2-0 CT-1 36 IN J945H

## (undated) DEVICE — 3M™ STERI-DRAPE™ U-DRAPE 1015: Brand: STERI-DRAPE™

## (undated) DEVICE — HANDPIECE SET WITH RETRACTABLE COAXIAL FAN SPRAY TIP AND SUCTION TUBE: Brand: INTERPULSE

## (undated) DEVICE — SURGICAL GOWN, XL SMARTSLEEVE: Brand: CONVERTORS

## (undated) DEVICE — GLOVE INDICATOR PI UNDERGLOVE SZ 6.5 BLUE

## (undated) DEVICE — SPONGE SCRUB 4 PCT CHLORHEXIDINE

## (undated) DEVICE — HOOD: Brand: T7PLUS

## (undated) DEVICE — CUFF TOURNIQUET 30 X 4 IN QUICK CONNECT DISP 1BLA

## (undated) DEVICE — ADHESIVE SKIN HIGH VISCOSITY EXOFIN 1ML

## (undated) DEVICE — STIRRUP STRAP ADULT DISP

## (undated) DEVICE — GLOVE SRG BIOGEL ORTHOPEDIC 8.5

## (undated) DEVICE — 3 BONE CEMENT MIXER: Brand: MIXEVAC

## (undated) DEVICE — SUT STRATAFIX SPIRAL MONOCRYL PLUS 4-0 PS-2 45CM SXMP1B118

## (undated) DEVICE — SYRINGE 50ML LL

## (undated) DEVICE — WEBRIL 6 IN UNSTERILE

## (undated) DEVICE — MAT ABSORBANT ARTHROSCOPY FLOOR 46 X 40 IN